# Patient Record
Sex: FEMALE | Race: WHITE | NOT HISPANIC OR LATINO | Employment: FULL TIME | URBAN - METROPOLITAN AREA
[De-identification: names, ages, dates, MRNs, and addresses within clinical notes are randomized per-mention and may not be internally consistent; named-entity substitution may affect disease eponyms.]

---

## 2017-02-28 ENCOUNTER — ALLSCRIPTS OFFICE VISIT (OUTPATIENT)
Dept: OTHER | Facility: OTHER | Age: 47
End: 2017-02-28

## 2017-03-26 ENCOUNTER — LAB CONVERSION - ENCOUNTER (OUTPATIENT)
Dept: OTHER | Facility: OTHER | Age: 47
End: 2017-03-26

## 2017-03-26 LAB — TSH SERPL DL<=0.05 MIU/L-ACNC: 2.32 MIU/L

## 2017-03-27 ENCOUNTER — GENERIC CONVERSION - ENCOUNTER (OUTPATIENT)
Dept: OTHER | Facility: OTHER | Age: 47
End: 2017-03-27

## 2017-04-06 ENCOUNTER — ALLSCRIPTS OFFICE VISIT (OUTPATIENT)
Dept: OTHER | Facility: OTHER | Age: 47
End: 2017-04-06

## 2017-06-06 ENCOUNTER — ALLSCRIPTS OFFICE VISIT (OUTPATIENT)
Dept: OTHER | Facility: OTHER | Age: 47
End: 2017-06-06

## 2017-08-07 DIAGNOSIS — Z12.31 ENCOUNTER FOR SCREENING MAMMOGRAM FOR MALIGNANT NEOPLASM OF BREAST: ICD-10-CM

## 2017-09-17 ENCOUNTER — LAB CONVERSION - ENCOUNTER (OUTPATIENT)
Dept: OTHER | Facility: OTHER | Age: 47
End: 2017-09-17

## 2017-09-17 ENCOUNTER — GENERIC CONVERSION - ENCOUNTER (OUTPATIENT)
Dept: OTHER | Facility: OTHER | Age: 47
End: 2017-09-17

## 2017-09-17 LAB
A/G RATIO (HISTORICAL): 1.3 (CALC) (ref 1–2.5)
ALBUMIN SERPL BCP-MCNC: 3.9 G/DL (ref 3.6–5.1)
ALP SERPL-CCNC: 84 U/L (ref 33–115)
ALT SERPL W P-5'-P-CCNC: 9 U/L (ref 6–29)
AST SERPL W P-5'-P-CCNC: 12 U/L (ref 10–35)
BASOPHILS # BLD AUTO: 0.9 %
BASOPHILS # BLD AUTO: 69 CELLS/UL (ref 0–200)
BILIRUB SERPL-MCNC: 0.4 MG/DL (ref 0.2–1.2)
BUN SERPL-MCNC: 11 MG/DL (ref 7–25)
BUN/CREA RATIO (HISTORICAL): NORMAL (CALC) (ref 6–22)
CALCIUM SERPL-MCNC: 8.7 MG/DL (ref 8.6–10.2)
CHLORIDE SERPL-SCNC: 108 MMOL/L (ref 98–110)
CHOLEST SERPL-MCNC: 258 MG/DL
CHOLEST/HDLC SERPL: 5.7 (CALC)
CO2 SERPL-SCNC: 21 MMOL/L (ref 20–31)
CREAT SERPL-MCNC: 0.89 MG/DL (ref 0.5–1.1)
DEPRECATED RDW RBC AUTO: 12.9 % (ref 11–15)
EGFR AFRICAN AMERICAN (HISTORICAL): 91 ML/MIN/1.73M2
EGFR-AMERICAN CALC (HISTORICAL): 78 ML/MIN/1.73M2
EOSINOPHIL # BLD AUTO: 370 CELLS/UL (ref 15–500)
EOSINOPHIL # BLD AUTO: 4.8 %
GAMMA GLOBULIN (HISTORICAL): 3 G/DL (CALC) (ref 1.9–3.7)
GLUCOSE (HISTORICAL): 92 MG/DL (ref 65–99)
HCT VFR BLD AUTO: 38.1 % (ref 35–45)
HDLC SERPL-MCNC: 45 MG/DL
HGB BLD-MCNC: 12.8 G/DL (ref 11.7–15.5)
LDL CHOLESTEROL (HISTORICAL): 178 MG/DL (CALC)
LYMPHOCYTES # BLD AUTO: 2148 CELLS/UL (ref 850–3900)
LYMPHOCYTES # BLD AUTO: 27.9 %
MCH RBC QN AUTO: 27.9 PG (ref 27–33)
MCHC RBC AUTO-ENTMCNC: 33.6 G/DL (ref 32–36)
MCV RBC AUTO: 83 FL (ref 80–100)
MONOCYTES # BLD AUTO: 739 CELLS/UL (ref 200–950)
MONOCYTES (HISTORICAL): 9.6 %
NEUTROPHILS # BLD AUTO: 4374 CELLS/UL (ref 1500–7800)
NEUTROPHILS # BLD AUTO: 56.8 %
NON-HDL-CHOL (CHOL-HDL) (HISTORICAL): 213 MG/DL (CALC)
PLATELET # BLD AUTO: 366 THOUSAND/UL (ref 140–400)
PMV BLD AUTO: 9.3 FL (ref 7.5–12.5)
POTASSIUM SERPL-SCNC: 3.5 MMOL/L (ref 3.5–5.3)
RBC # BLD AUTO: 4.59 MILLION/UL (ref 3.8–5.1)
SODIUM SERPL-SCNC: 137 MMOL/L (ref 135–146)
TOTAL PROTEIN (HISTORICAL): 6.9 G/DL (ref 6.1–8.1)
TRIGL SERPL-MCNC: 192 MG/DL
TSH SERPL DL<=0.05 MIU/L-ACNC: 2.37 MIU/L
WBC # BLD AUTO: 7.7 THOUSAND/UL (ref 3.8–10.8)

## 2017-09-19 ENCOUNTER — ALLSCRIPTS OFFICE VISIT (OUTPATIENT)
Dept: OTHER | Facility: OTHER | Age: 47
End: 2017-09-19

## 2017-09-19 DIAGNOSIS — Z12.31 ENCOUNTER FOR SCREENING MAMMOGRAM FOR MALIGNANT NEOPLASM OF BREAST: ICD-10-CM

## 2017-09-19 DIAGNOSIS — G43.709 CHRONIC MIGRAINE WITHOUT AURA WITHOUT STATUS MIGRAINOSUS, NOT INTRACTABLE: ICD-10-CM

## 2017-09-19 DIAGNOSIS — E55.9 VITAMIN D DEFICIENCY: ICD-10-CM

## 2017-09-19 DIAGNOSIS — R53.83 OTHER FATIGUE: ICD-10-CM

## 2017-09-20 ENCOUNTER — LAB CONVERSION - ENCOUNTER (OUTPATIENT)
Dept: OTHER | Facility: OTHER | Age: 47
End: 2017-09-20

## 2017-09-20 LAB
CONTAINER TYP (HISTORICAL): NORMAL
FINAL RESOLUTION (HISTORICAL): NORMAL
QUESTION/PROBLEM (HISTORICAL): NORMAL
SPECIMEN STATUS REPORT (HISTORICAL): NORMAL

## 2017-09-21 ENCOUNTER — GENERIC CONVERSION - ENCOUNTER (OUTPATIENT)
Dept: OTHER | Facility: OTHER | Age: 47
End: 2017-09-21

## 2017-09-21 ENCOUNTER — HOSPITAL ENCOUNTER (OUTPATIENT)
Dept: MAMMOGRAPHY | Facility: HOSPITAL | Age: 47
Discharge: HOME | End: 2017-09-21
Payer: COMMERCIAL

## 2017-09-21 ENCOUNTER — ALLSCRIPTS OFFICE VISIT (OUTPATIENT)
Dept: OTHER | Facility: OTHER | Age: 47
End: 2017-09-21

## 2017-09-21 DIAGNOSIS — Z12.31 ENCOUNTER FOR SCREENING MAMMOGRAM FOR MALIGNANT NEOPLASM OF BREAST: ICD-10-CM

## 2017-09-21 PROCEDURE — G0202 SCR MAMMO BI INCL CAD: HCPCS

## 2017-09-21 PROCEDURE — 77063 BREAST TOMOSYNTHESIS BI: CPT

## 2017-09-26 ENCOUNTER — ALLSCRIPTS OFFICE VISIT (OUTPATIENT)
Dept: OTHER | Facility: OTHER | Age: 47
End: 2017-09-26

## 2017-09-26 ENCOUNTER — GENERIC CONVERSION - ENCOUNTER (OUTPATIENT)
Dept: OTHER | Facility: OTHER | Age: 47
End: 2017-09-26

## 2017-09-27 ENCOUNTER — LAB CONVERSION - ENCOUNTER (OUTPATIENT)
Dept: OTHER | Facility: OTHER | Age: 47
End: 2017-09-27

## 2017-09-27 LAB
ADDITIONAL INFORMATION (HISTORICAL): ABNORMAL
ADEQUACY: (HISTORICAL): ABNORMAL
COMMENT (HISTORICAL): ABNORMAL
CONTAINER TYP (HISTORICAL): NORMAL
CYTOTECHNOLOGIST: (HISTORICAL): ABNORMAL
FINAL RESOLUTION (HISTORICAL): NORMAL
GENERAL CATEGORIZATION (HISTORICAL): ABNORMAL
HPV MRNA E6/E7 (HISTORICAL): NOT DETECTED
INTERPRETATION (HISTORICAL): ABNORMAL
LMP (HISTORICAL): ABNORMAL
PATHOLOGIST (HISTORICAL): ABNORMAL
PREV. BX: (HISTORICAL): ABNORMAL
PREV. PAP (HISTORICAL): ABNORMAL
QUESTION/PROBLEM (HISTORICAL): NORMAL
SOURCE (HISTORICAL): ABNORMAL
SPECIMEN STATUS REPORT (HISTORICAL): NORMAL

## 2017-10-25 ENCOUNTER — GENERIC CONVERSION - ENCOUNTER (OUTPATIENT)
Dept: OTHER | Facility: OTHER | Age: 47
End: 2017-10-25

## 2017-10-26 DIAGNOSIS — E78.5 HYPERLIPIDEMIA: ICD-10-CM

## 2017-11-10 ENCOUNTER — ALLSCRIPTS OFFICE VISIT (OUTPATIENT)
Dept: OTHER | Facility: OTHER | Age: 47
End: 2017-11-10

## 2017-11-13 NOTE — PROGRESS NOTES
Assessment    1  Shingles (053 9) (B02 9)    Plan  Shingles    · Ultram 50 MG Oral Tablet (TraMADol HCl); TAKE 1 TABLET 3 TIMES DAILY ASNEEDED   · ValACYclovir HCl - 1 GM Oral Tablet; TAKE 1 TABLET 3 TIMES DAILY x 7 days    Discussion/Summary    Patient to start on Valtrex 1 GM TID x 7 days  Patient to start on Ultram 50 mg by mouth TID as needed for nerve pain  Patient instructed to avoid sharing towels or other personal products, provide good hand hygiene, and to stay away from small children, the elderly, and any pregnant women  WIll follow up on Wednesday for a recheck  If any issues or concerns please call office  Possible side effects of new medications were reviewed with the patient/guardian today  The treatment plan was reviewed with the patient/guardian  The patient/guardian understands and agrees with the treatment plan      Chief Complaint  Rash to neck      History of Present Illness  HPI: Jeny Durbin is here today for an acute visit  She states on Sunday she woke up with what she thought was a pimple on the back of her neck  She states she popped the area and since that time has been having pain to her neck  The area is noted to the left side of her posterior neck and she did notice that she has a rash with pustules to her left upper arm as well  She has been feeling run down and under the weather and was running a low grade fever  She states she was covering her neck with a band aid but states it has really been hurting  She also does have swollen lymph nodes that are painful to the left side of her neck  She denies any other issues  Review of Systems   Constitutional: as noted in HPI   ENT: no ear ache, no loss of hearing, no nosebleeds or nasal discharge, no sore throat or hoarseness  Cardiovascular: no complaints of slow or fast heart rate, no chest pain, no palpitations, no leg claudication or lower extremity edema    Respiratory: no complaints of shortness of breath, no wheezing, no dyspnea on exertion, no orthopnea or PND  Breasts: no complaints of breast pain, breast lump or nipple discharge  Gastrointestinal: no complaints of abdominal pain, no constipation, no nausea or diarrhea, no vomiting, no bloody stools  Genitourinary: no complaints of dysuria, no incontinence, no pelvic pain, no dysmenorrhea, no vaginal discharge or abnormal vaginal bleeding  Musculoskeletal: no complaints of arthralgia, no myalgia, no joint swelling or stiffness, no limb pain or swelling  Integumentary: as noted in HPI  Neurological: no complaints of headache, no confusion, no numbness or tingling, no dizziness or fainting  ROS reviewed  Active Problems    1  Anxiety (300 00) (F41 9)   2  Asthma (493 90) (J45 909)   3  Bruxism (306 8) (F45 8)   4  Cellulitis of foot (682 7) (L03 119)   5  Chronic migraine without aura (346 70) (G43 709)   6  Chronic sinusitis (473 9) (J32 9)   7  Chronic tension-type headache, intractable (339 12) (G44 221)   8  Dermatitis (692 9) (L30 9)   9  Encounter for routine gynecological examination with Papanicolaou smear of cervix (V72 31,V76 2) (Z01 419)   10  Encounter for screening mammogram for malignant neoplasm of breast (V76 12)  (Z12 31)   11  Fatigue (780 79) (R53 83)   12  Heartburn (787 1) (R12)   13  Hyperlipidemia (272 4) (E78 5)   14  Hypertension (401 9) (I10)   15  Hypokalemia (276 8) (E87 6)   16  Low vitamin D level (268 9) (E55 9)   17  Menorrhagia with irregular cycle (626 2) (N92 1)   18  Nonspecific abnormal results of function study of thyroid (794 5) (R94 6)   19  Obstructive sleep apnea (327 23) (G47 33)    Past Medical History  1  History of Anxiety (300 00) (F41 9)   2  History of Endometriosis (617 9) (N80 9)   3  History of High cholesterol (272 0) (E78 00)   4  History of acute sinusitis (V12 69) (Z87 09)   5  History of esophageal reflux (V12 79) (Z87 19)   6   History of Plantar fasciitis (728 71) (M72 2)  Active Problems And Past Medical History Reviewed: The active problems and past medical history were reviewed and updated today  Family History  Mother    1  Family history of colon cancer (V16 0) (Z80 0)   2  Family history of essential hypertension (V17 49) (Z82 49)   3  Family history unobtainable   4  Family history of Living and Healthy  Father    5  Family history of colon cancer (V16 0) (Z80 0)   6  Family history of essential hypertension (V17 49) (Z82 49)   7  Family history unobtainable  Maternal Grandmother    8  Family history of Colon Cancer (V16 0)   9  Family history of colon cancer (V16 0) (Z80 0)   10  Family history unobtainable  Paternal Grandmother    6  Family history unobtainable  Maternal Grandfather    12  Family history unobtainable  Paternal Grandfather    15  Family history unobtainable  Maternal Uncle    14  Family history of Cancer  Family History    15  Family history of Asperger's disorder   16  Family history of Diabetes Mellitus (V18 0)   17  Family history of bipolar disorder (V17 0) (Z81 8)   18  Family history of depression (V17 0) (Z81 8)   19  Family history of Hypertension (V17 49)   20  Family history of Raynaud's phenomenon  Family History Reviewed: The family history was reviewed and updated today  Social History   · Adopted   · Being A Social Drinker   · Caffeine use (V49 89) (F15 90)   ·    · Never A Smoker   · No drug use   · Two children  The social history was reviewed and is unchanged  Surgical History    1  History of  Section   2  History of Foot Surgery Left   3  History of Laparoscopy Of Uterus  Surgical History Reviewed: The surgical history was reviewed and updated today  Current Meds   1  Allegra 180 MG TABS; Therapy: (Recorded:05Siw4707) to Recorded   2  Atorvastatin Calcium 10 MG Oral Tablet; TAKE 1 TABLET DAILY; Therapy: 21BJD9050 to (Lan Uriostegui)  Requested for: 54Pcg9224; Last Rx:54Xtu3971 Ordered   3   Divalproex Sodium  MG Oral Tablet Extended Release 24 Hour; 1 PO Q HS; Therapy: 21XCN2232 to (Evaluate:08Kxd0777)  Requested for: 70ZQX9252; Last Rx:40Qjy4484 Ordered   4  Fluticasone Propionate 50 MCG/ACT Nasal Suspension; USE 2 SPRAYS IN EACH NOSTRIL ONCE DAILY; Therapy: 91DBY0237 to (Last Rx:06Apr2017)  Requested for: 33Gai4794 Ordered   5  Magnesium 200 MG Oral Tablet; Therapy: (Recorded:06Jun2017) to Recorded   6  Methocarbamol 500 MG Oral Tablet; take 1 tab qhs x 2 weeks, then 1 tab qOD x 1 week, then prn spasm or headache; Therapy: 81WHL0662 to (Evaluate:92Yjv1956)  Requested for: 84PLZ2614; Last Rx:06Jun2017 Ordered   7  Protriptyline HCl - 5 MG Oral Tablet; One in am for 10 days then two in am after that; Therapy: 33TUJ6908 to (Last Myrl Senegal)  Requested for: 87KUX4733 Ordered   8  Rizatriptan Benzoate 10 MG Oral Tablet; TAKE 1 TABLET AT ONSET OF HEADACHE, MAY REPEAT IN 2 HOURS  MAX 2 TABLETS IN 24 HOURS; Therapy: 47OIS2847 to (Last Rx:39Vyy8632)  Requested for: 28Rqg3121 Ordered   9  SUMAtriptan Succinate 6 MG/0 5ML Subcutaneous Solution Auto-injector; INJECT 0 5ML AT ONSET OF HEADACHE REPEAT IN 2 HRS, MAX 2 PER DAY  NO MORE THEN 2 DAYS/WEEK; Therapy: 04JEF8116 to (Evaluate:27Jun2017)  Requested for: 35WNB2500; Last FC:09ATH3155 Ordered   10  Topiramate 100 MG Oral Tablet; TAKE 2 TABLETS AT BEDTIME; Therapy: 15QAJ5674 to (Leslie Garcia)  Requested for: 02Kht2910; Last  Rx:37Qaf6212 Ordered   11  Venlafaxine HCl ER 75 MG Oral Capsule Extended Release 24 Hour; take 1 capsule  daily; Therapy: 39Kfe0431 to (Last Rx:97Aes8349)  Requested for: 75Sbb9898 Ordered   12  Verapamil HCl  MG Oral Tablet Extended Release; take 1 tablet by mouth once  daily; Therapy: 13Ryw4548 to (Percell Closs)  Requested for: 43KRX6960; Last  Rx:39Myx0212 Ordered   15  Zantac TABS; Therapy: (Recorded:67Qdl1187) to Recorded    The medication list was reviewed and updated today  Allergies  1  No Known Drug Allergies  2   Seasonal    Vitals   Recorded: 85SZW3411 01:47PM   Temperature 99 3 F, Temporal   Heart Rate 123   Respiration 18   Systolic 037, LUE, Sitting   Diastolic 78, LUE, Sitting   Height 5 ft 6 in   Weight 196 lb    BMI Calculated 31 64   BSA Calculated 1 98   O2 Saturation 98       Physical Exam   Constitutional  General appearance: No acute distress, well appearing and well nourished  Pulmonary  Respiratory effort: No increased work of breathing or signs of respiratory distress  Auscultation of lungs: Clear to auscultation  Cardiovascular  Palpation of heart: Normal PMI, no thrills  Auscultation of heart: Normal rate and rhythm, normal S1 and S2, without murmurs  Lymphatic  Palpation of lymph nodes in neck: Abnormal  -- lymphadenopathy noted to left side of neck  Skin  Skin and subcutaneous tissue: Abnormal  -- crust over lesion noted to left side of posterior neck with some redness around the border of the scab, red cluster noted to left upper arm with scabbed over lesions  Psychiatric  Orientation to person, place, and time: Normal    Mood and affect: Normal          Future Appointments    Date/Time Provider Specialty Site   12/19/2017 08:30 AM Wilner Gross MD Neurology [facility] NEUROLOGY [facility]   12/21/2017 08:00 AM MABEL Pabon   [address] [place] [address]   11/15/2017 05:40 PM Catina Gustafson       Signatures   Electronically signed by : Frankie Bernard, ; Nov 10 2017  2:21PM EST                       (Author)    Electronically signed by : MABEL Singletary ; Nov 12 2017  9:40PM EST                       (Co-author)

## 2017-11-15 ENCOUNTER — GENERIC CONVERSION - ENCOUNTER (OUTPATIENT)
Dept: OTHER | Facility: OTHER | Age: 47
End: 2017-11-15

## 2017-12-10 ENCOUNTER — GENERIC CONVERSION - ENCOUNTER (OUTPATIENT)
Dept: OTHER | Facility: OTHER | Age: 47
End: 2017-12-10

## 2017-12-10 ENCOUNTER — LAB CONVERSION - ENCOUNTER (OUTPATIENT)
Dept: OTHER | Facility: OTHER | Age: 47
End: 2017-12-10

## 2017-12-10 LAB
25(OH)D3 SERPL-MCNC: 28 NG/ML (ref 30–100)
A/G RATIO (HISTORICAL): 1.2 (CALC) (ref 1–2.5)
ALBUMIN SERPL BCP-MCNC: 3.9 G/DL (ref 3.6–5.1)
ALP SERPL-CCNC: 73 U/L (ref 33–115)
ALT SERPL W P-5'-P-CCNC: 8 U/L (ref 6–29)
AST SERPL W P-5'-P-CCNC: 12 U/L (ref 10–35)
BASOPHILS # BLD AUTO: 0.8 %
BASOPHILS # BLD AUTO: 51 CELLS/UL (ref 0–200)
BILIRUB SERPL-MCNC: 0.3 MG/DL (ref 0.2–1.2)
BUN SERPL-MCNC: 9 MG/DL (ref 7–25)
BUN/CREA RATIO (HISTORICAL): NORMAL (CALC) (ref 6–22)
CALCIUM SERPL-MCNC: 9.1 MG/DL (ref 8.6–10.2)
CHLORIDE SERPL-SCNC: 106 MMOL/L (ref 98–110)
CHOLEST SERPL-MCNC: 199 MG/DL
CHOLEST/HDLC SERPL: 5.1 (CALC)
CO2 SERPL-SCNC: 25 MMOL/L (ref 20–31)
CREAT SERPL-MCNC: 0.85 MG/DL (ref 0.5–1.1)
DEPRECATED RDW RBC AUTO: 13.8 % (ref 11–15)
EGFR AFRICAN AMERICAN (HISTORICAL): 96 ML/MIN/1.73M2
EGFR-AMERICAN CALC (HISTORICAL): 83 ML/MIN/1.73M2
EOSINOPHIL # BLD AUTO: 250 CELLS/UL (ref 15–500)
EOSINOPHIL # BLD AUTO: 3.9 %
GAMMA GLOBULIN (HISTORICAL): 3.3 G/DL (CALC) (ref 1.9–3.7)
GLUCOSE (HISTORICAL): 83 MG/DL (ref 65–99)
HCT VFR BLD AUTO: 38.3 % (ref 35–45)
HDLC SERPL-MCNC: 39 MG/DL
HGB BLD-MCNC: 13 G/DL (ref 11.7–15.5)
LDL CHOLESTEROL (HISTORICAL): 132 MG/DL (CALC)
LYMPHOCYTES # BLD AUTO: 1798 CELLS/UL (ref 850–3900)
LYMPHOCYTES # BLD AUTO: 28.1 %
MCH RBC QN AUTO: 28.5 PG (ref 27–33)
MCHC RBC AUTO-ENTMCNC: 33.9 G/DL (ref 32–36)
MCV RBC AUTO: 84 FL (ref 80–100)
MONOCYTES # BLD AUTO: 499 CELLS/UL (ref 200–950)
MONOCYTES (HISTORICAL): 7.8 %
NEUTROPHILS # BLD AUTO: 3802 CELLS/UL (ref 1500–7800)
NEUTROPHILS # BLD AUTO: 59.4 %
NON-HDL-CHOL (CHOL-HDL) (HISTORICAL): 160 MG/DL (CALC)
PLATELET # BLD AUTO: 369 THOUSAND/UL (ref 140–400)
PMV BLD AUTO: 10 FL (ref 7.5–12.5)
POTASSIUM SERPL-SCNC: 4.5 MMOL/L (ref 3.5–5.3)
RBC # BLD AUTO: 4.56 MILLION/UL (ref 3.8–5.1)
SODIUM SERPL-SCNC: 138 MMOL/L (ref 135–146)
TOTAL PROTEIN (HISTORICAL): 7.2 G/DL (ref 6.1–8.1)
TRIGL SERPL-MCNC: 146 MG/DL
TSH SERPL DL<=0.05 MIU/L-ACNC: 2.27 MIU/L
WBC # BLD AUTO: 6.4 THOUSAND/UL (ref 3.8–10.8)

## 2017-12-11 ENCOUNTER — LAB CONVERSION - ENCOUNTER (OUTPATIENT)
Dept: OTHER | Facility: OTHER | Age: 47
End: 2017-12-11

## 2017-12-11 LAB
25(OH)D3 SERPL-MCNC: 28 NG/ML (ref 30–100)
HOMOCYST(E)INE, P/S (HISTORICAL): 8 UMOL/L
VIT B12 SERPL-MCNC: 923 PG/ML (ref 200–1100)

## 2017-12-13 ENCOUNTER — GENERIC CONVERSION - ENCOUNTER (OUTPATIENT)
Dept: OTHER | Facility: OTHER | Age: 47
End: 2017-12-13

## 2017-12-19 ENCOUNTER — ALLSCRIPTS OFFICE VISIT (OUTPATIENT)
Dept: OTHER | Facility: OTHER | Age: 47
End: 2017-12-19

## 2017-12-20 NOTE — PROGRESS NOTES
Assessment  1  Chronic migraine without aura (346 70) (G43 709)   2  Chronic tension-type headache, intractable (339 12) (G44 221)   3  Obstructive sleep apnea (327 23) (G47 33)    Plan  Chronic migraine without aura    · Divalproex Sodium  MG Oral Tablet Extended Release 24 Hour; 1 PO QHS   Rx By: Viridiana Hwang; Dispense: 30 Days ; #:30 Tablet Extended Release 24 Hour; Refill: 11; For: Chronic migraine without aura; KALEIGH = N; Verified Transmission to [place] [address]; Last Updated By: [employer]; 12/19/2017 9:11:10 AM   · Protriptyline HCl - 5 MG Oral Tablet; One in am for 10 days then two in am afterthat   Rx By: Viridiana Hwang; Dispense: 0 Days ; #:60 Tablet; Refill: 11; For: Chronic migraine without aura; KALEIGH = N; Verified Transmission to [place] [address]; Last Updated By: [employer]; 12/19/2017 9:11:11 AM   · Rizatriptan Benzoate 10 MG Oral Tablet; TAKE 1 TABLET AT ONSET OFHEADACHE, MAY REPEAT IN 2 HOURS  MAX 2 TABLETS IN 24 HOURS   Rx By: Viridiana Hwang; Dispense: 0 Days ; #:12 Tablet; Refill: 6;For: Chronic migraine without aura; KALEIGH = N; Verified Transmission to [place] [address]; Last Updated By: [employer]; 12/19/2017 9:11:10 AM   · Ketorolac Tromethamine 30 MG/ML Injection Solution   Rx By: Viridiana Hwang; For: Chronic migraine without aura; Dose of 2 ML; Intramuscular; KALEIGH = N; Administered by: Ramirez Ayala MA: 12/19/2017 9:06:00 AM  Chronic migraine without aura, Chronic tension-type headache, intractable    · Topiramate 100 MG Oral Tablet; TAKE 1 TABLETS AT BEDTIME   Rx By: Viridiana Hwang; Dispense: 30 Days ; #:60 Tablet; Refill: 11; For: Chronic migraine without aura, Chronic tension-type headache, intractable; KALEIGH = N; Verified Transmission to [place] [address]; Last Updated By: [employer]; 12/19/2017 9:11:10 AM  Obstructive sleep apnea    · Follow-up visit in 1 year Evaluation and Treatment  Follow-up  Status: Complete Done:98Htd6431   Ordered; For: Obstructive sleep apnea; Ordered By: Oniel Aquino Performed:  Due: 90LIV9728; Last Updated By: Gina Hanna; 12/19/2017 9:33:45 AM    Chief Complaint  Chief Complaint Free Text Note Form: follow up for migraines  History of Present Illness  HPI: We had the pleasure of evaluating Barrie Stokes in neurological follow up today  As you know she is a 40year old right handed female  She is a welfare   Chronic Migraine/ Tension Headaches:What medications do you take or have you taken for your headaches? PREVENTIVE: Vitamin B2 400mg, amlodipine, Pamalor, mag oxide, venlafaxine, gabapentin- side effects, cyproheptadine, verapamil, zonisamide, topamaxABORTIVE: Treximet, maxalt- helps, naproxen, dexamethasone, ibuprofen, toradol- did help break help- Non-Medical/Alternative Treatments used in the past for headaches: message- Headache trigger: Fatigue, Stress/Tension, missing meals, chewing, lack of sleep, menstruation, weatherAura: - noneHow often do the headaches occur - TTH- 2 times a week; migraines- since last seen 5 altogether  She has noted they are occurring with her menstruation  What time of the day do the headaches start â variesHow long do the headaches last - TTH- all day; migraines 2hr to 2 daysWhere are they located â start left side of head, frontal region, occipital, neckWhat is the intensity of pain âTTH- average pain level 5/10; migraines- average pain level 8-10/10Describe your usual headache â migraines- Pressure, sharp; TTH- dullAssociated symptoms: nausea, Decrease appetite, photophobia, phonophobia, sensitive to smells, Problem with concentration, left pupil size change, light-headed or dizzy, stiff or sore neck, prefer to be alone and in a dark room, unable to work, irritable, easily frustratedSleep: she is getting up very tired and has noted she is very fatigued   Most of her headaches are occurring in the morning thus we thinks she may have sleep apnea  She is grinding her teeth  She also tells me that she was not able to go to sleep center in the past as her insurance did not cover it  Brain MRI wo contrast 2/26/15- Several tiny bifrontal subcortical T2 FLAIR and hyperintense foci suspicious for mild chronic micro-vascular changes which may be associated with migraine headaches- Mild multi-focal paranasal sinus disease w/o air-fluid levelsROS reviewed      Review of Systems  Neurological ROS:  Constitutional: no fever, no chills, no recent weight gain, no recent weight loss, no complaints of feeling tired, no changes in appetite  HEENT: sinus problems,-- feeling congested-- and-- eye pain  Cardiovascular:  no chest pain or pressure, no palpitations present, the heart rate was not rapid or irregular, no swelling in the arms or legs, no poor circulation  Respiratory:  no unusual or persistant cough, no shortness of breath with or without exertion  Gastrointestinal:  no nausea, no vomiting, no diarrhea, no abdominal pain, no changes in bowel habits, no melena, no loss of bowel control  Genitourinary:  no incontinence, no feelings of urinary urgency, no increase in frequency, no urinary hesitancy, no dysuria, no hematuria  Musculoskeletal:  no arthralgias, no myalgias, no immobility or loss of function, no head/neck/back pain, no pain while walking  Integumentary  no masses, no rash, no skin lesions, no livedo reticularis  Psychiatric:  no anxiety, no depression, no mood swings, no psychiatric hospitalizations, no sleep problems  Endocrine  no unusual weight loss or gain, no excessive urination, no excessive thirst, no hair loss or gain, no hot or cold intolerance, no menstrual period change or irregularity, no loss of sexual ability or drive, no erection difficulty, no nipple discharge  Hematologic/Lymphatic:  no unusual bleeding, no tendency for easy bruising, no clotting skin or lumps  Neurological General: headache  Neurological Mental Status:  no confusion, no mood swings, no alteration or loss of consciousness, no difficulty expressing/understanding speech, no memory problems  Neurological Cranial Nerves:  no blurry or double vision, no loss of vision, no face drooping, no facial numbness or weakness, no taste or smell loss/changes, no hearing loss or ringing, no vertigo or dizziness, no dysphagia, no slurred speech  Neurological Motor findings include:  no tremor, no twitching, no cramping(pre/post exercise), no atrophy  Neurological Coordination:  no unsteadiness, no vertigo or dizziness, no clumsiness, no problems reaching for objects  Neurological Sensory:  no numbness, no pain, no tingling, does not fall when eyes closed or taking a shower  Neurological Gait:  no difficulty walking, not falling to one side, no sensation of being pushed, has not had falls  ROS Reviewed:   ROS reviewed  Active Problems  1  Anxiety (300 00) (F41 9)   2  Asthma (493 90) (J45 909)   3  Bruxism (306 8) (F45 8)   4  Cellulitis of foot (682 7) (L03 119)   5  Chronic migraine without aura (346 70) (G43 709)   6  Chronic sinusitis (473 9) (J32 9)   7  Chronic tension-type headache, intractable (339 12) (G44 221)   8  Dermatitis (692 9) (L30 9)   9  Encounter for routine gynecological examination with Papanicolaou smear of cervix (V72 31,V76 2) (Z01 419)   10  Encounter for screening mammogram for malignant neoplasm of breast (V76 12)  (Z12 31)   11  Fatigue (780 79) (R53 83)   12  Heartburn (787 1) (R12)   13  Hyperlipidemia (272 4) (E78 5)   14  Hypertension (401 9) (I10)   15  Hypokalemia (276 8) (E87 6)   16  Low vitamin D level (268 9) (E55 9)   17  Menorrhagia with irregular cycle (626 2) (N92 1)   18  Nonspecific abnormal results of function study of thyroid (794 5) (R94 6)   19  Obstructive sleep apnea (327 23) (G47 33)   20  Shingles (053 9) (B02 9)    Past Medical History  1  History of Anxiety (300 00) (F41 9)   2   History of Endometriosis (617 9) (N80 9)   3  History of High cholesterol (272 0) (E78 00)   4  History of acute sinusitis (V12 69) (Z87 09)   5  History of esophageal reflux (V12 79) (Z87 19)   6  History of Plantar fasciitis (728 71) (M72 2)    Surgical History  1  History of  Section   2  History of Foot Surgery Left   3  History of Laparoscopy Of Uterus    Family History  Mother    1  Family history of colon cancer (V16 0) (Z80 0)   2  Family history of essential hypertension (V17 49) (Z82 49)   3  Family history unobtainable   4  Family history of Living and Healthy  Father    5  Family history of colon cancer (V16 0) (Z80 0)   6  Family history of essential hypertension (V17 49) (Z82 49)   7  Family history unobtainable  Maternal Grandmother    8  Family history of Colon Cancer (V16 0)   9  Family history of colon cancer (V16 0) (Z80 0)   10  Family history unobtainable  Paternal Grandmother    6  Family history unobtainable  Maternal Grandfather    12  Family history unobtainable  Paternal Grandfather    15  Family history unobtainable  Maternal Uncle    14  Family history of Cancer  Family History    15  Family history of Asperger's disorder   16  Family history of Diabetes Mellitus (V18 0)   17  Family history of bipolar disorder (V17 0) (Z81 8)   18  Family history of depression (V17 0) (Z81 8)   19  Family history of Hypertension (V17 49)   20  Family history of Raynaud's phenomenon    Social History   · Adopted   · Being A Social Drinker   · Caffeine use (V49 89) (F15 90)   ·    · Never A Smoker   · No drug use   · Two children    Current Meds   1  Allegra 180 MG TABS; Therapy: (Recorded:34Yjd2631) to Recorded   2  Atorvastatin Calcium 10 MG Oral Tablet; TAKE 1 TABLET DAILY; Therapy: 67JHQ2810 to (Perico Rosenthal)  Requested for: 23Gay2335; Last Rx:57Erz9247 Ordered   3  Divalproex Sodium  MG Oral Tablet Extended Release 24 Hour; 1 PO Q HS;  Therapy: 55MLF9061 to (Evaluate:12Qhx5027)  Requested for: 75DVQ5806; Last Rx: 26QBE8339 Ordered   4  Fluticasone Propionate 50 MCG/ACT Nasal Suspension; USE 2 SPRAYS IN EACH NOSTRIL ONCE DAILY; Therapy: 39FPY7349 to (Last Rx:06Apr2017)  Requested for: 06Apr2017 Ordered   5  Magnesium 200 MG Oral Tablet; Therapy: (Recorded:06Jun2017) to Recorded   6  Methocarbamol 500 MG Oral Tablet; take 1 tab qhs x 2 weeks, then 1 tab qOD x 1 week, then prn spasm or headache; Therapy: 79MQX2037 to (Evaluate:85Ocn6128)  Requested for: 51NBE2268; Last Rx:06Jun2017 Ordered   7  Protriptyline HCl - 5 MG Oral Tablet; One in am for 10 days then two in am after that; Therapy: 98XVM2163 to (Nii Bates)  Requested for: 71PBE9155 Ordered   8  Rizatriptan Benzoate 10 MG Oral Tablet; TAKE 1 TABLET AT ONSET OF HEADACHE, MAY REPEAT IN 2 HOURS  MAX 2 TABLETS IN 24 HOURS; Therapy: 99CEH3015 to (Last Rx:56Cnv2164)  Requested for: 55Sga0414 Ordered   9  SUMAtriptan Succinate 6 MG/0 5ML Subcutaneous Solution Auto-injector; INJECT 0 5ML AT ONSET OF HEADACHE REPEAT IN 2 HRS, MAX 2 PER DAY  NO MORE THEN 2 DAYS/WEEK; Therapy: 65ZSB9100 to (Evaluate:27Jun2017)  Requested for: 78ZHB7517; Last LQ:90ANH1348 Ordered   10  Topiramate 100 MG Oral Tablet; TAKE 2 TABLETS AT BEDTIME; Therapy: 31BQO6044 to (Samy Richards)  Requested for: 13Fcv8830; Last  Rx:76Tep8697 Ordered   11  Verapamil HCl  MG Oral Tablet Extended Release; take 1 tablet by mouth once  daily; Therapy: 55Qan8090 to (Kwadwo Deras)  Requested for: 17IFO1724; Last  Rx:39Eom8833 Ordered   12  Zantac TABS; Therapy: (Recorded:82Zgz8221) to Recorded    Allergies  1  No Known Drug Allergies  2  Seasonal    Vitals  Signs   Recorded: 19Dec2017 08:45AM   Heart Rate: 426  Systolic: 207  Diastolic: 93  Weight: 097 lb 12 8 oz  BMI Calculated: 31 93  BSA Calculated: 1 99    Physical Exam   Constitutional  General appearance: No acute distress, well appearing and well nourished     Eyes  Ophthalmoscopic examination: Vision is grossly normal  Gross visual field testing by confrontation shows no abnormalities  EOMI in both eyes  Conjunctivae clear  Eyelids normal palpebral fissures equal  Orbits exhibit normal position  No discharge from the eyes  PERRL  Musculoskeletal  Gait and station: Normal gait, stance and balance  Muscle strength: Normal strength throughout  Muscle tone: No atrophy, abnormal movements, flaccidity, cogwheeling or spasticity     Neurologic  Orientation to person, place, and time: Normal    2nd cranial nerve: Normal    3rd, 4th, and 6th cranial nerves: Normal    5th cranial nerve: Normal    7th cranial nerve: Normal    8th cranial nerve: Normal    9th cranial nerve: Normal    11th cranial nerve: Normal    12th cranial nerve: Normal    Sensation: Normal    Reflexes: Normal    Coordination: Normal        Signatures   Electronically signed by : Jaydon Wilkerson MD; Dec 19 2017  3:58PM EST                       (Author)

## 2017-12-26 DIAGNOSIS — I10 ESSENTIAL (PRIMARY) HYPERTENSION: ICD-10-CM

## 2017-12-26 DIAGNOSIS — G47.33 OBSTRUCTIVE SLEEP APNEA: ICD-10-CM

## 2017-12-26 DIAGNOSIS — E78.5 HYPERLIPIDEMIA: ICD-10-CM

## 2017-12-26 DIAGNOSIS — F41.9 ANXIETY DISORDER: ICD-10-CM

## 2018-01-10 NOTE — RESULT NOTES
Verified Results  (1) CBC/PLT/DIFF [id number] 10:18AM Jose Luis Rachel   REPORT COMMENT:  FASTING:YES     Test Name Result Flag Reference   WHITE BLOOD CELL COUNT 7 7 Thousand/uL  3 8-10 8   RED BLOOD CELL COUNT 4 59 Million/uL  3 80-5 10   HEMOGLOBIN 12 8 g/dL  11 7-15 5   HEMATOCRIT 38 1 %  35 0-45 0   MCV 83 0 fL  80 0-100 0   MCH 27 9 pg  27 0-33 0   MCHC 33 6 g/dL  32 0-36 0   RDW 12 9 %  11 0-15 0   PLATELET COUNT 904 Thousand/uL  140-400   ABSOLUTE NEUTROPHILS 4374 cells/uL  3079-1915   ABSOLUTE LYMPHOCYTES 2148 cells/uL  850-3900   ABSOLUTE MONOCYTES 739 cells/uL  200-950   ABSOLUTE EOSINOPHILS 370 cells/uL     ABSOLUTE BASOPHILS 69 cells/uL  0-200   NEUTROPHILS 56 8 %     LYMPHOCYTES 27 9 %     MONOCYTES 9 6 %     EOSINOPHILS 4 8 %     BASOPHILS 0 9 %     MPV 9 3 fL  7 5-12 5     (1) COMPREHENSIVE METABOLIC PANEL [id number] 45:23LB Jose Luis Rachel     Test Name Result Flag Reference   GLUCOSE 92 mg/dL  65-99   Fasting reference interval   UREA NITROGEN (BUN) 11 mg/dL  7-25   CREATININE 0 89 mg/dL  0 50-1 10   eGFR NON-AFR   AMERICAN 78 mL/min/1 73m2  > OR = 60   eGFR AFRICAN AMERICAN 91 mL/min/1 73m2  > OR = 60   BUN/CREATININE RATIO   2-72   NOT APPLICABLE (calc)   SODIUM 137 mmol/L  135-146   POTASSIUM 3 5 mmol/L  3 5-5 3   CHLORIDE 108 mmol/L     CARBON DIOXIDE 21 mmol/L  20-31   CALCIUM 8 7 mg/dL  8 6-10 2   PROTEIN, TOTAL 6 9 g/dL  6 1-8 1   ALBUMIN 3 9 g/dL  3 6-5 1   GLOBULIN 3 0 g/dL (calc)  1 9-3 7   ALBUMIN/GLOBULIN RATIO 1 3 (calc)  1 0-2 5   BILIRUBIN, TOTAL 0 4 mg/dL  0 2-1 2   ALKALINE PHOSPHATASE 84 U/L     AST 12 U/L  10-35   ALT 9 U/L  6-29     (1) LIPID PANEL, FASTING [id number] 10:18AM Jose Luis Willarders     Test Name Result Flag Reference   CHOLESTEROL, TOTAL 258 mg/dL H <200   HDL CHOLESTEROL 45 mg/dL L >59   TRIGLICERIDES 996 mg/dL H <150   LDL-CHOLESTEROL 178 mg/dL (calc) H    Reference range: <100     Desirable range <100 mg/dL for patients with CHD or  diabetes and <70 mg/dL for diabetic patients with  known heart disease  LDL-C is now calculated using the Daryl-Gore   calculation, which is a validated novel method providing   better accuracy than the Friedewald equation in the   estimation of LDL-C  Ya Paniagua  5081;473(52): 6060-1492   (http://[employer]/faq/TMZ254)   CHOL/HDLC RATIO 5 7 (calc) H <5 0   NON HDL CHOLESTEROL 213 mg/dL (calc) H <130   For patients with diabetes plus 1 major ASCVD risk   factor, treating to a non-HDL-C goal of <100 mg/dL   (LDL-C of <70 mg/dL) is considered a therapeutic   option       (Q) TSH, 3RD GENERATION [id number] 10:18AM Hafsa Hooks   REPORT COMMENT:  FASTING:YES     Test Name Result Flag Reference   TSH 2 37 mIU/L     Reference Range                         > or = 20 Years  0 40-4 50                              Pregnancy Ranges            First trimester    0 26-2 66            Second trimester   0 55-2 73            Third trimester    0 43-2 91

## 2018-01-12 VITALS
WEIGHT: 197 LBS | HEART RATE: 89 BPM | DIASTOLIC BLOOD PRESSURE: 83 MMHG | SYSTOLIC BLOOD PRESSURE: 123 MMHG | BODY MASS INDEX: 32.28 KG/M2

## 2018-01-12 VITALS
WEIGHT: 196 LBS | TEMPERATURE: 99.3 F | OXYGEN SATURATION: 98 % | RESPIRATION RATE: 18 BRPM | DIASTOLIC BLOOD PRESSURE: 78 MMHG | HEART RATE: 123 BPM | HEIGHT: 66 IN | BODY MASS INDEX: 31.5 KG/M2 | SYSTOLIC BLOOD PRESSURE: 128 MMHG

## 2018-01-12 NOTE — MISCELLANEOUS
Message  Return to work or school:   Param Leigh is under my professional care  She was seen in my office on 11/28/16       Please allow patient to have different chair to provide better support for neck/back to help prevent migraines thus allowing patient to function at full capacity and work efficiently  Jolly Hidalgo PA-C        Future Appointments    Signatures   Electronically signed by : Jolly Hidalgo [facility]; Nov 28 2016 10:52AM EST                       (Author)    Electronically signed by : Jolly Hidalgo [facility]; Nov 28 2016 10:53AM EST                       (Author)    Electronically signed by : Kym Mcgovern MD; Nov 28 2016 12:18PM EST                       (Co-participant)

## 2018-01-14 VITALS
HEIGHT: 66 IN | OXYGEN SATURATION: 98 % | TEMPERATURE: 97.2 F | BODY MASS INDEX: 32.22 KG/M2 | SYSTOLIC BLOOD PRESSURE: 122 MMHG | RESPIRATION RATE: 22 BRPM | WEIGHT: 200.5 LBS | HEART RATE: 95 BPM | DIASTOLIC BLOOD PRESSURE: 70 MMHG

## 2018-01-14 VITALS
BODY MASS INDEX: 32.04 KG/M2 | HEART RATE: 93 BPM | OXYGEN SATURATION: 98 % | DIASTOLIC BLOOD PRESSURE: 90 MMHG | WEIGHT: 198.5 LBS | SYSTOLIC BLOOD PRESSURE: 140 MMHG

## 2018-01-14 VITALS
SYSTOLIC BLOOD PRESSURE: 122 MMHG | BODY MASS INDEX: 32.17 KG/M2 | WEIGHT: 199.31 LBS | DIASTOLIC BLOOD PRESSURE: 72 MMHG

## 2018-01-15 NOTE — MISCELLANEOUS
To whom it may concern,     Marek Campoverde, 1970, is under the care of [facility]  Due to her medical condition, she was unable to work on 9/25/17 and needed to come in late on 10/3/17  She also  had an appointment 9/26/17 which resulted in her being late for work that day  Please contact my office with any questions or concerns at [phone]  Sincerely,             MABEL Casillas         Electronically signed by:Stacey Chopra MD  Oct 25 2017  8:46AM EST Acknowledgement

## 2018-01-16 NOTE — RESULT NOTES
Verified Results  (1) CBC/PLT/DIFF [id number] 09:32AM Preston Doylenatanael   REPORT COMMENT:  FASTING:YES     Test Name Result Flag Reference   WHITE BLOOD CELL COUNT 8 1 Thousand/uL  3 8-10 8   RED BLOOD CELL COUNT 4 61 Million/uL  3 80-5 10   HEMOGLOBIN 13 6 g/dL  11 7-15 5   HEMATOCRIT 40 5 %  35 0-45 0   MCV 87 9 fL  80 0-100 0   MCH 29 6 pg  27 0-33 0   MCHC 33 7 g/dL  32 0-36 0   RDW 13 4 %  11 0-15 0   PLATELET COUNT 593 Thousand/uL  140-400   MPV 8 1 fL  7 5-12 5   ABSOLUTE NEUTROPHILS 4698 cells/uL  0013-7453   ABSOLUTE LYMPHOCYTES 2349 cells/uL  850-3900   ABSOLUTE MONOCYTES 721 cells/uL  200-950   ABSOLUTE EOSINOPHILS 300 cells/uL     ABSOLUTE BASOPHILS 32 cells/uL  0-200   NEUTROPHILS 58 0 %     LYMPHOCYTES 29 0 %     MONOCYTES 8 9 %     EOSINOPHILS 3 7 %     BASOPHILS 0 4 %       (1) COMPREHENSIVE METABOLIC PANEL [id number] 00:79KT Preston Doylenatanael   REPORT COMMENT:  FASTING:YES     Test Name Result Flag Reference   GLUCOSE 91 mg/dL  65-99   Fasting reference interval   UREA NITROGEN (BUN) 15 mg/dL  7-25   CREATININE 1 07 mg/dL  0 50-1 10   eGFR NON-AFR   AMERICAN 63 mL/min/1 73m2  > OR = 60   eGFR AFRICAN AMERICAN 73 mL/min/1 73m2  > OR = 60   BUN/CREATININE RATIO   9-54   NOT APPLICABLE (calc)   SODIUM 140 mmol/L  135-146   POTASSIUM 3 9 mmol/L  3 5-5 3   CHLORIDE 107 mmol/L     CARBON DIOXIDE 25 mmol/L  20-31   CALCIUM 9 1 mg/dL  8 6-10 2   PROTEIN, TOTAL 6 8 g/dL  6 1-8 1   ALBUMIN 3 8 g/dL  3 6-5 1   GLOBULIN 3 0 g/dL (calc)  1 9-3 7   ALBUMIN/GLOBULIN RATIO 1 3 (calc)  1 0-2 5   BILIRUBIN, TOTAL 0 4 mg/dL  0 2-1 2   ALKALINE PHOSPHATASE 74 U/L     AST 11 U/L  10-35   ALT 9 U/L  6-29     (1) LIPID PANEL, FASTING [id number] 09:32AM Preston Doylenatanael     Test Name Result Flag Reference   CHOLESTEROL, TOTAL 219 mg/dL H 125-200   HDL CHOLESTEROL 36 mg/dL L > OR = 46   TRIGLICERIDES 273 mg/dL H <150   LDL-CHOLESTEROL 151 mg/dL (calc) H <130   Desirable range <100 mg/dL for patients with CHD or  diabetes and <70 mg/dL for diabetic patients with  known heart disease  CHOL/HDLC RATIO 6 1 (calc) H < OR = 5 0   NON HDL CHOLESTEROL 183 mg/dL (calc) H    Target for non-HDL cholesterol is 30 mg/dL higher than   LDL cholesterol target       (Q) TSH, 3RD GENERATION [id number] 09:32AM Jessica Sanchez   REPORT COMMENT:  FASTING:YES     Test Name Result Flag Reference   TSH 2 32 mIU/L     Reference Range                         > or = 20 Years  0 40-4 50                              Pregnancy Ranges            First trimester    0 26-2 66            Second trimester   0 55-2 73            Third trimester    0 43-2 91

## 2018-01-22 VITALS
BODY MASS INDEX: 31.82 KG/M2 | SYSTOLIC BLOOD PRESSURE: 140 MMHG | HEIGHT: 66 IN | RESPIRATION RATE: 18 BRPM | WEIGHT: 198 LBS | DIASTOLIC BLOOD PRESSURE: 92 MMHG | HEART RATE: 93 BPM

## 2018-01-22 VITALS
BODY MASS INDEX: 31.5 KG/M2 | RESPIRATION RATE: 22 BRPM | OXYGEN SATURATION: 98 % | DIASTOLIC BLOOD PRESSURE: 72 MMHG | HEART RATE: 85 BPM | TEMPERATURE: 98.9 F | HEIGHT: 66 IN | SYSTOLIC BLOOD PRESSURE: 130 MMHG | WEIGHT: 196 LBS

## 2018-01-22 VITALS
OXYGEN SATURATION: 99 % | TEMPERATURE: 100.4 F | RESPIRATION RATE: 18 BRPM | BODY MASS INDEX: 31.5 KG/M2 | HEART RATE: 108 BPM | WEIGHT: 196 LBS | DIASTOLIC BLOOD PRESSURE: 78 MMHG | HEIGHT: 66 IN | SYSTOLIC BLOOD PRESSURE: 128 MMHG

## 2018-01-23 VITALS
SYSTOLIC BLOOD PRESSURE: 152 MMHG | BODY MASS INDEX: 31.93 KG/M2 | DIASTOLIC BLOOD PRESSURE: 93 MMHG | HEART RATE: 107 BPM | WEIGHT: 197.8 LBS

## 2018-01-23 NOTE — RESULT NOTES
Verified Results  (Q) TSH, 3RD GENERATION [id number] 10:27AM Mike Ply   REPORT COMMENT:  FASTING:YES     Test Name Result Flag Reference   TSH 2 27 mIU/L     Reference Range                         > or = 20 Years  0 40-4 50                              Pregnancy Ranges            First trimester    0 26-2 66            Second trimester   0 55-2 73            Third trimester    0 43-2 91

## 2018-01-23 NOTE — RESULT NOTES
Verified Results  (1) LIPID PANEL, FASTING [id number] 10:27AM Sara Olivier     Test Name Result Flag Reference   CHOLESTEROL, TOTAL 199 mg/dL  <200   HDL CHOLESTEROL 39 mg/dL L >78   TRIGLICERIDES 128 mg/dL  <150   LDL-CHOLESTEROL 132 mg/dL (calc) H    Reference range: <100     Desirable range <100 mg/dL for patients with CHD or  diabetes and <70 mg/dL for diabetic patients with  known heart disease  LDL-C is now calculated using the Daryl-Gore   calculation, which is a validated novel method providing   better accuracy than the Friedewald equation in the   estimation of LDL-C  Shaina Kennedy  [facility]  9918;086(51): 7774-9427   (http://[employer]/faq/CKK709)   CHOL/HDLC RATIO 5 1 (calc) H <5 0   NON HDL CHOLESTEROL 160 mg/dL (calc) H <130   For patients with diabetes plus 1 major ASCVD risk   factor, treating to a non-HDL-C goal of <100 mg/dL   (LDL-C of <70 mg/dL) is considered a therapeutic   option  (1) COMPREHENSIVE METABOLIC PANEL [id number] 00:48TI Sara Olivier     Test Name Result Flag Reference   GLUCOSE 83 mg/dL  65-99   Fasting reference interval   UREA NITROGEN (BUN) 9 mg/dL  7-25   CREATININE 0 85 mg/dL  0 50-1 10   eGFR NON-AFR   AMERICAN 83 mL/min/1 73m2  > OR = 60   eGFR AFRICAN AMERICAN 96 mL/min/1 73m2  > OR = 60   BUN/CREATININE RATIO   8-97   NOT APPLICABLE (calc)   SODIUM 138 mmol/L  135-146   POTASSIUM 4 5 mmol/L  3 5-5 3   CHLORIDE 106 mmol/L     CARBON DIOXIDE 25 mmol/L  20-31   CALCIUM 9 1 mg/dL  8 6-10 2   PROTEIN, TOTAL 7 2 g/dL  6 1-8 1   ALBUMIN 3 9 g/dL  3 6-5 1   GLOBULIN 3 3 g/dL (calc)  1 9-3 7   ALBUMIN/GLOBULIN RATIO 1 2 (calc)  1 0-2 5   BILIRUBIN, TOTAL 0 3 mg/dL  0 2-1 2   ALKALINE PHOSPHATASE 73 U/L     AST 12 U/L  10-35   ALT 8 U/L  6-29     (1) CBC/PLT/DIFF [id number] 10:27AM Sara Pean   REPORT COMMENT:  FASTING:YES     Test Name Result Flag Reference   WHITE BLOOD CELL COUNT 6 4 Thousand/uL  3 8-10 8   RED BLOOD CELL COUNT 4 56 Million/uL  3 80-5 10   HEMOGLOBIN 13 0 g/dL  11 7-15 5   HEMATOCRIT 38 3 %  35 0-45 0   MCV 84 0 fL  80 0-100 0   MCH 28 5 pg  27 0-33 0   MCHC 33 9 g/dL  32 0-36 0   RDW 13 8 %  11 0-15 0   PLATELET COUNT 530 Thousand/uL  140-400   ABSOLUTE NEUTROPHILS 3802 cells/uL  1049-0750   ABSOLUTE LYMPHOCYTES 1798 cells/uL  850-3900   ABSOLUTE MONOCYTES 499 cells/uL  200-950   ABSOLUTE EOSINOPHILS 250 cells/uL     ABSOLUTE BASOPHILS 51 cells/uL  0-200   NEUTROPHILS 59 4 %     LYMPHOCYTES 28 1 %     MONOCYTES 7 8 %     EOSINOPHILS 3 9 %     BASOPHILS 0 8 %     MPV 10 0 fL  7 5-12 5     (1) COMPREHENSIVE METABOLIC PANEL [id number] 35:13FO [employer]     Test Name Result Flag Reference   GLUCOSE,RANDM 83     SODIUM 138     POTASSIUM 4 5     CHLORIDE 106     CARBON DIOXIDE 25     BLOOD UREA NITROGEN 9     CREATININE 0 85     CALCIUM 9 1     BILI, TOTAL 0 3     ALK PHOSPHATAS 73     ALT (SGPT) 8     AST(SGOT) 12     ALBUMIN 3 9     TOTAL PROTEIN 7 2     eGFR  96     eGFR Non- 83       (1) CBC/PLT/DIFF [id number] 08:47AM [employer]     Test Name Result Flag Reference   WBC COUNT 6 4     RBC COUNT 4 56     HEMOGLOBIN 13 0     HEMATOCRIT 38 3     MCV 84 0     MCH 28 5     MCHC 33 9     RDW 13 8     MPV 10 0     PLATELET COUNT 153     NEUTROPHILS RELATIVE PERCENT 59 4     LYMPHOCYTES RELATIVE PERCENT 28 1     MONOCYTES RELATIVE PERCENT 7 8     EOSINOPHILS RELATIVE PERCENT 3 9     BASOPHILS RELATIVE PERCENT 0 8     NEUTROPHILS ABSOLUTE COUNT 3802     LYMPHOCYTES ABSOLUTE COUNT 1798     MONOCYTES ABSOLUTE COUNT 499     EOSINOPHILS ABSOLUTE COUNT 250     BASOPHILS ABSOLUTE COUNT 51       (1) LIPID PANEL, FASTING [id number] 08:46AM [employer]     Test Name Result Flag Reference   CHOLESTEROL 199     HDL,DIRECT 39 L    LDL CHOLESTEROL CALCULATED 132 H    TRIGLYCERIDES 146     TCHOL/HDL RATIO 5 1 H

## 2018-01-23 NOTE — PROGRESS NOTES
Assessment   1  Chronic migraine without aura (346 70) (G43 709)  2  Chronic tension-type headache, intractable (339 12) (G44 221)  3  Menstrual migraine without status migrainosus, not intractable (346 40) (G43 829)  4  Migraine, unspecified, not intractable, without status migrainosus (346 90) (G43 909)  5  Stabbing headache (339 85) (G44 85)    Plan  Chronic migraine without aura, Chronic tension-type headache, intractable, Menstrual  migraine without status migrainosus, not intractable    · Renew: Ketorolac Tromethamine 10 MG Oral Tablet; take 1 tablet every 8 hours as  needed for pain  Rx By: Carola Abreu; Dispense: 30 Days ; #:12 Tablet; Refill: 3;For: Chronic migraine   without aura, Chronic tension-type headache, intractable, Menstrual migraine without   status migrainosus, not intractable; KALEIGH = N; Verified Transmission to   [facility]; Last Updated By: System, [employer]; 11/28/2016   10:31:00 AM  Chronic migraine without aura, Chronic tension-type headache, intractable, Menstrual  migraine without status migrainosus, not intractable, Migraine, unspecified, not  intractable, without status migrainosus    · Start: Topiramate 25 MG Oral Tablet; 1 tab qhs x 5 days, then 2 tabs qhs x 5 days, then  3 tablets qhs x 5 days, then 4 tab qhs  Rx By: Carola Abreu; Dispense: 30 Days ; #:120 Tablet;  Refill: 3;For: Chronic migraine   without aura, Chronic tension-type headache, intractable, Menstrual migraine without   status migrainosus, not intractable, Migraine, unspecified, not intractable, without status   migrainosus; KALEIGH = N; Verified Transmission to [facility]; Last Updated By: System, [employer]; 11/28/2016 10:24:17 AM  Chronic migraine without aura, Chronic tension-type headache, intractable, Menstrual  migraine without status migrainosus, not intractable, Migraine, unspecified, not  intractable, without status migrainosus, Stabbing headache    · Follow-up visit in 3 months Evaluation and Treatment  Follow-up  Status: Complete   Done: 70TNV5290  Ordered; For: Chronic migraine without aura, Chronic tension-type headache, intractable,   Menstrual migraine without status migrainosus, not intractable, Migraine, unspecified,   not intractable, without status migrainosus, Stabbing headache;  Ordered By:   Azeem Reynolds  Performed:   Due: 77NGL5030; Last Updated By:   Jess Miller; 11/28/2016 10:36:53 AM    Discussion/Summary  Discussion Summary:   Topamax conpletely resolved headaches except menstrual migraine therefore will titrate off zonisamide in reverse of how titrated on medication, as titrating off zonisamide can titrate on topamax  If develops side effects on topamax will restart zonisamide at higher dose i e  150mg  Told continue abortive medications  Follow up in 3 months  Contact our office with questions/concerns  Contact our office and/or go to ER with new/change in symptoms  Medication Side Effects Reviewed: Possible side effects of new medications were reviewed with the patient/guardian today  Patient Guardian understands agrees: The treatment plan was reviewed with the patient/guardian  The patient/guardian understands and agrees with the treatment plan   Counseling Documentation With Imm: The patient was counseled regarding instructions for management, risk factor reductions, prognosis, patient and family education, impressions, risks and benefits of treatment options, importance of compliance with treatment  Headache [facility]:   The patient was counseled regarding;   Discussed side effects of all medications prescribed today to the patient in detail  see above   Patient education was completed today and we also discussed precautions for rebound headaches  When patient has a moderate to severe headache, they should seek rest, initiate relaxation and apply cold compresses to the head  Also recommended to the patient :  1  Maintain regular sleep schedule  2  Limit over the counter medications  (No more than 3 times a week)  3  Maintain headache diary  4  Limit caffeine to 1-2 cups a day or less  5  Avoid dietary trigger  (list given to the patient and reviewed with them)  6  Patient is to have regular frequent meals to prevent headache onset  Chief Complaint  Chief Complaint Free Text Note Form: headaches      History of Present Illness  HPI: We had the pleasure of evaluating Katelyn Jones in neurological follow up today  As you know she is a 41year old right handed female  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  Since last seen headaches have worsened off topamax, while on topamax had no headaches except menstrual migraines  What is your current pain level - 5/10  Headaches started at what age - 16years of age  Accident or injury prior to onset of headaches - no  How often do the headaches occur - TTH- daily; migraines- 1-2 times a week  What time of the day do the headaches start - varies   How long do the headaches last - TTH- constant; migraines 1-10 days  Are you ever headache free - no  Where are they located - start left side of head, frontal region, occipital, neck  What is the intensity of pain -TTH- average pain level 5/10; migraines- average pain level 10/10  Any warning prior to headache and how long do they last - none  Describe your usual headache - Pressure, sharp, dull  Associated symptoms: nausea, Decrease appetite, photophobia, phonophobia, sensitive to smells, Problem with concentration, left pupil size change, light-headed or dizzy, stiff or sore neck, prefer to be alone and in a dark room, unable to work, irritable, easily frustrated   Headaches are worse if the patient: cough, sneeze, bending over  Headache trigger: Fatigue, Stress/Tension, missing meals, chewing, lack of sleep, menstruation, weather  Are you current pregnant or planning on getting pregnant?  No done with family planning  What time of the year do headaches occur more frequently - fall may have more headaches  Have you seen someone else for headaches or pain - 20 years ago she saw someone   What medications do you take or have you taken for your headaches? PREVENTIVE: Vitamin B2 400mg, amlodipine, Pamalor, mag oxide, venlafaxine, gabapentin- side effects, cyproheptadine, verapamil, zonisamide, topamax- was effective  ABORTIVE: Treximet, maxalt, naproxen, dexamethasone, ibuprofen, toradol- did help break help  Non-Medical/Alternative Treatments used in the past for headaches: message      Review of Systems  Neurological ROS:   Constitutional: fatigue  HEENT:  no sinus problems, not feeling congested, no blurred vision, no dryness of the eyes, no eye pain, no hearing loss, no tinnitus, no mouth sores, no sore throat, no hoarseness, no dysphagia, no masses, no bleeding  Cardiovascular:  no chest pain or pressure, no palpitations present, the heart rate was not rapid or irregular, no swelling in the arms or legs, no poor circulation  Respiratory:  no unusual or persistant cough, no shortness of breath with or without exertion  Gastrointestinal:  no nausea, no vomiting, no diarrhea, no abdominal pain, no changes in bowel habits, no melena, no loss of bowel control  Genitourinary:  no incontinence, no feelings of urinary urgency, no increase in frequency, no urinary hesitancy, no dysuria, no hematuria  Musculoskeletal: myalgias and head/neck/back pain  Integumentary  no masses, no rash, no skin lesions, no livedo reticularis  Psychiatric:  no anxiety, no depression, no mood swings, no psychiatric hospitalizations, no sleep problems  Endocrine  no unusual weight loss or gain, no excessive urination, no excessive thirst, no hair loss or gain, no hot or cold intolerance, no menstrual period change or irregularity, no loss of sexual ability or drive, no erection difficulty, no nipple discharge     Hematologic/Lymphatic:  no unusual bleeding, no tendency for easy bruising, no clotting skin or lumps  Neurological General: headache  Neurological Mental Status:  no confusion, no mood swings, no alteration or loss of consciousness, no difficulty expressing/understanding speech, no memory problems  Neurological Cranial Nerves:  no blurry or double vision, no loss of vision, no face drooping, no facial numbness or weakness, no taste or smell loss/changes, no hearing loss or ringing, no vertigo or dizziness, no dysphagia, no slurred speech  Neurological Motor findings include:  no tremor, no twitching, no cramping(pre/post exercise), no atrophy  Neurological Coordination:  no unsteadiness, no vertigo or dizziness, no clumsiness, no problems reaching for objects  Neurological Sensory:  no numbness, no pain, no tingling, does not fall when eyes closed or taking a shower  Neurological Gait:  no difficulty walking, not falling to one side, no sensation of being pushed, has not had falls  ROS Reviewed:   ROS reviewed  Active Problems   1  Acute bacterial rhinosinusitis (461 9) (J01 90,B96 89)  2  Allergic rhinosinusitis (477 9) (J30 9)  3  Asthma (493 90) (J45 909)  4  Bronchitis (490) (J40)  5  Bruxism (306 8) (F45 8)  6  Cellulitis of foot (682 7) (L03 119)  7  Chronic migraine without aura (346 70) (G43 709)  8  Chronic sinusitis (473 9) (J32 9)  9  Chronic tension-type headache, intractable (339 12) (G44 221)  10  Dermatitis (692 9) (L30 9)  11  Fatigue (780 79) (R53 83)  12  Headache, chronic daily (784 0) (R51)  13  Heartburn (787 1) (R12)  14  Hypertension (401 9) (I10)  15  Menstrual migraine without status migrainosus, not intractable (346 40) (G43 829)  16  Migraine, unspecified, not intractable, without status migrainosus (346 90) (G43 909)  17  Need for prophylactic vaccination and inoculation against influenza (V04 81) (Z23)  18  Obstructive sleep apnea (327 23) (G47 33)  19  Stabbing headache (339 85) (G44 85)    Past Medical History   1   History of Anxiety (300 00) (F41 9)  2  History of Endometriosis (617 9) (N80 9)  3  History of High cholesterol (272 0) (E78 00)  4  History of acute sinusitis (V12 69) (Z87 09)  5  History of acute sinusitis (V12 69) (Z87 09)  6  History of esophageal reflux (V12 79) (Z87 19)  7  Need for prophylactic vaccination and inoculation against influenza (V04 81) (Z23)  8  History of Plantar fasciitis (728 71) (M72 2)  Active Problems And Past Medical History Reviewed: The active problems and past medical history were reviewed and updated today  Surgical History   1  History of  Section  2  History of Foot Surgery Left  3  History of Laparoscopy Of Uterus  Surgical History Reviewed: The surgical history was reviewed and updated today  Family History  Mother   1  Family history of colon cancer (V16 0) (Z80 0)  2  Family history of essential hypertension (V17 49) (Z82 49)  3  Family history unobtainable  4  Family history of Living and Healthy  Father   5  Family history of colon cancer (V16 0) (Z80 0)  6  Family history of essential hypertension (V17 49) (Z82 49)  7  Family history unobtainable  Maternal Grandmother   8  Family history of Colon Cancer (V16 0)  9  Family history of colon cancer (V16 0) (Z80 0)  10  Family history unobtainable  Paternal Grandmother   6  Family history unobtainable  Maternal Grandfather   12  Family history unobtainable  Paternal Grandfather   15  Family history unobtainable  Maternal Uncle   14  Family history of Cancer  Family History   15  Family history of Asperger's disorder  16  Family history of Diabetes Mellitus (V18 0)  17  Family history of bipolar disorder (V17 0) (Z81 8)  18  Family history of depression (V17 0) (Z81 8)  19  Family history of Hypertension (V17 49)  20  Family history of Raynaud's phenomenon  Family History Reviewed: The family history was reviewed and updated today         Social History    · Adopted   · Being A Social Drinker   · Caffeine use (V49 89) (F15 90)   ·    · Never A Smoker   · No drug use   · Two children  Social History Reviewed: The social history was reviewed and updated today  The social history was reviewed and is unchanged  Current Meds  1  Allegra 180 MG TABS; Therapy: (Recorded:77Kie3391) to Recorded  2  Azithromycin 250 MG Oral Tablet; TAKE 2 TABLETS ON DAY 1 THEN TAKE 1 TABLET A   DAY FOR 4 DAYS; Therapy: 94Jdx8299 to (Last Rx:80Vjr9429)  Requested for: 22VVR5808 Ordered  3  Ketorolac Tromethamine 10 MG Oral Tablet; take 1 tablet every 8 hours as needed for   pain; Therapy: 19Fqm9339 to (Evaluate:54Uzr2422)  Requested for: 09Trr8568; Last   Rx:51Eqi6146 Ordered  4  Promethazine-Codeine 6 25-10 MG/5ML Oral Syrup; TAKE 5 ML BY MOUTH AT BEDTIME   AS NEEDED; Therapy: 41Rlc1274 to (Evaluate:14Jan2016); Last Rx:11Ili0468 Ordered  5  Rizatriptan Benzoate 10 MG Oral Tablet; TAKE 1 TABLET AT ONSET OF HEADACHE, MAY   REPEAT IN 2 HOURS  MAX 2 TABLETS IN 24 HOURS; Therapy: 33JSZ3398 to (Last Rx:73Mgo8118)  Requested for: 69Zkn2771 Ordered  6  TiZANidine HCl - 2 MG Oral Tablet; TAKE 1 TABLET AT BEDTIME FOR 5 NIGHTS THEN 2   TABLET THEREAFTER; Therapy: 20PSH0110 to (Db Ye)  Requested for: 78NAG3143; Last   Rx:95Xjx4400 Ordered  7  Venlafaxine HCl ER 75 MG Oral Tablet Extended Release 24 Hour; take 1 tablet by mouth   once daily; Therapy: 30HJE0379 to (Evaluate:10Mar2017)  Requested for: 16KCO2964; Last   Rx:00Fch4465 Ordered  8  Verapamil HCl - 40 MG Oral Tablet; 1 tablet twice a day; Therapy: 36ETN9298 to (Evaluate:55Cgx1044)  Requested for: 27KFF7185; Last   Rx:25Mar2016 Ordered  9  Zantac TABS; Therapy: (Recorded:44Ost0300) to Recorded    Allergies   1  No Known Drug Allergies   2   Seasonal    Vitals  Signs   Recorded: 83XKX3067 10:10AM   Heart Rate: 112  Respiration: 16  Systolic: 422  Diastolic: 96  Weight: 686 lb 6 oz  BMI Calculated: 32 66  BSA Calculated: 2 01    Physical Exam    Constitutional General appearance: No acute distress, well appearing and well nourished  Musculoskeletal   Gait and station: Normal gait, stance and balance  Muscle strength: Normal strength throughout  Muscle tone: No atrophy, abnormal movements, flaccidity, cogwheeling or spasticity  Neurologic   Orientation to person, place, and time: Normal     2nd cranial nerve: Normal     3rd, 4th, and 6th cranial nerves: Normal     5th cranial nerve: Normal     7th cranial nerve: Normal     8th cranial nerve: Normal     9th cranial nerve: Normal     11th cranial nerve: Normal     12th cranial nerve: Normal     Sensation: Normal     Reflexes: Normal     Coordination: Normal     Mood and affect: Normal        Attending Note  Collaborating Physician Note: Collaborating Note:1  I agree with the Advanced Practitioner note1         1 Amended By: Ernesto Edwards; Nov 28 2016 12:18 PM EST    Message  Return to work or school:   Nettie Saxena is under my professional care  She was seen in my office on 11/28/16       Please allow patient to have different chair to provide better support for neck/back to help prevent migraines thus allowing patient to function at full capacity and work efficiently  Caty Dykes PA-C  Future Appointments    Date/Time Provider Specialty Site   12/20/2016 08:30 AM MABEL Hernandez   [address] [place] [address]   02/28/2017 11:15 AM Maci Lopez, [facility] [address]     Signatures   Electronically signed by : Caty Dykes [facility]; Nov 28 2016 10:52AM EST                       (Author)    Electronically signed by : Michael Faulkner MD; Nov 28 2016 12:18PM EST                       (Co-participant)

## 2018-01-23 NOTE — PROGRESS NOTES
Assessment    1  Encounter for preventive health examination (V70 0) (Z00 00)   2  Hypertension (401 9) (I10)   3  Hyperlipidemia (272 4) (E78 5)   4  Anxiety (300 00) (F41 9)    Plan  Anxiety    · Avoid foods and beverages that contain caffeine ; Status:Complete;   Done: 89KGU3201   · Be sure to get at least 8 hours of sleep every night ; Status:Complete;   Done: 84FXX5174   · Decreasing the stress in your life may help your condition improve ; Status:Complete;    Done: 42DZL3657   · Call [phone] if: The symptoms seem worse ; Status:Complete;   Done:  69XCH4178   · Call [phone] if: Your feelings of being frightened, nervous, anxious, and out of  control are happening more often ; Status:Complete;   Done: 20Ecl8752  Anxiety, Hyperlipidemia, Hypertension    · (1) CBC/PLT/DIFF; Status:Active; Requested for:13Esb6572;   Hyperlipidemia    · Atorvastatin Calcium 10 MG Oral Tablet; TAKE 1 TABLET DAILY   · Begin or continue regular aerobic exercise  Gradually work up to at least 3 sessions of 30  minutes of exercise a week ; Status:Complete;   Done: 95Nsn6401   · Eat no more than 30 grams of fat per day ; Status:Complete;   Done: 28Cga9141   · There are many exercise options for seniors ; Status:Complete;   Done: 11MSU8311   · Call [phone] if: You have muscle cramps ; Status:Complete;   Done: 90OSU9258   · Call [phone] if: You have pain in the stomach area ; Status:Complete;   Done:  66HUY6458   · Call [phone] if: You start vomiting ; Status:Complete;   Done: 35YVU1049   · Call 911 if: You experience a new kind of chest pain (angina) or pressure ;  Status:Complete;   Done: 44PWT5608   · Call 911 if: You have any symptoms of a stroke ; Status:Complete;   Done: 05ALH2019   · (1) COMPREHENSIVE METABOLIC PANEL; Status:Active;  Requested ABDUL:55PFH3210;   Hypertension    · A diet low in sodium and high in potassium, magnesium, and calcium can help your  blood pressure ; Status:Complete; Done: 62YLB6951   · Begin a limited exercise program ; Status:Complete;   Done: 32Ssz9002   · Eat a low fat and low cholesterol diet ; Status:Complete;   Done: 39QCI6496   · We encourage you to begin to make lifestyle changes to help control your blood  pressure  These may include losing weight, increasing your activity level, limiting salt in  your diet, decreasing alcohol intake, and eating a diet low in fat and rich in fruits  and vegetables ; Status:Complete;   Done: 50LOE6256   · Call [phone] if: You become dizzy or lightheaded, especially when you stand up  after sitting for a while ; Status:Complete;   Done: 78SGB6259   · Call [phone] if: You develop double vision (see two of everything) ;  Status:Complete;   Done: 91Zpm2254   · Seek Immediate Medical Attention if: You have a severe headache that will not go away ;  Status:Complete;   Done: 33Env7587   · Seek Immediate Medical Attention if: Your blood pressure is greater than 250/120 for 2  consecutive readings ; Status:Complete;   Done: 36BZI5171    Discussion/Summary  health maintenance visit Currently, she eats an adequate diet and has an inadequate exercise regimen  cervical cancer screening is current Breast cancer screening: mammogram is needed every year  Colorectal cancer screening: colorectal cancer screening is not indicated  Osteoporosis screening: bone mineral density testing is not indicated  Screening lab work includes Up-to-date see discussion  Advice and education were given regarding nutrition, aerobic exercise, weight bearing exercise, calcium supplements, vitamin D supplements and sunscreen use  Patient discussion: discussed with the patient  She is up-to-date on her screening tests  She did follow-up with gyn and is up-to-date on her Pap smear  Getting her mammogram  Up-to-date on her blood work  Discussed with her her recent blood work  Cholesterol is significantly elevated and persistently so   Will have her start on atorvastatin 10 mg 1 daily  Discussed potential side effects with her  Will have her have repeat CMP about 6-8 weeks after starting this medication  Will have her get full laboratory testing done in about 3-4 months to assess if the dosage of this medication needs to be adjusted  Blood pressure controlled  Continue the for up until as previously  Continue to follow up with Neurology regarding her chronic headaches  Discussed with her possibly starting some mild muscle relaxant especially toward the evening which may help with some of the neck symptoms which may be worsening some of her headaches  Prescription given for the methocarbamol as noted above  Will have her follow up in about 3-4 months or sooner if needed  Possible side effects of new medications were reviewed with the patient/guardian today  The treatment plan was reviewed with the patient/guardian  The patient/guardian understands and agrees with the treatment plan      History of Present Illness  , Adult Female: The patient is being seen for a health maintenance evaluation  Social History: She is   Work status: working full time  General Health: The patient's health since the last visit is described as good  She has regular dental visits  She denies vision problems  She denies hearing loss  Lifestyle:  She consumes a diverse and healthy diet  She does not have any weight concerns  She does not exercise regularly  She does not use tobacco  She denies alcohol use  She denies drug use  Reproductive health: the patient is premenopausal   she reports normal menses  she is sexually active  Screening: Cervical cancer screening includes a pap smear performed 9/19/17 and human papilloma virus screening performed 9/19/17  Breast cancer screening includes a mammogram performed last year, a clinical breast exam performed 9/19/17 and Will be done on 9/21/17  She hasn't been previously screened for colorectal cancer     Metabolic screening includes lipid profile performed 9/16/17, glucose screening performed 9/16/17, thyroid function test performed 9/16/17 and no previous DEXA  HPI: She presents for wellness visit  Has generally been doing about the same  She continues to have headaches but she feels these are often related to her not sleeping well as well as possibly related to some tightness into the neck with positional changes  She feels that chair that she sits on may be playing a role with this  Muscles do get tight into the neck  Appetite has been generally good  Denies any nausea, vomiting, or diarrhea  She does, however, get occasional nausea with her headaches  Tolerating her verapamil without difficulty  Denies any localized weakness  Has had some additional stressors especially through work  Some stressors at home as well  She has been watching her diet but admits not as closely as she would want  She has cut back on the fatty foods  Denies any chest pain or palpitations  Continues to follow-up with Neurology on a regular basis  Allergies have been relatively well controlled  Did follow-up with gynecology over the last few days  Did have a Pap smear done  Will be getting her mammogram done  Denies any significant vision changes  Bowel movements have remained normal       Review of Systems    Constitutional: feeling tired  Eyes: dryness of the eyes  Cardiovascular: no chest pain and no palpitations  Respiratory: no shortness of breath  Gastrointestinal: as noted in HPI  Genitourinary: no dysuria  Musculoskeletal: as noted in HPI  Neurological: as noted in HPI  Psychiatric: as noted in HPI  ROS reviewed  Active Problems    1  Anxiety (300 00) (F41 9)   2  Asthma (493 90) (J45 909)   3  Cellulitis of foot (682 7) (L03 119)   4  Chronic sinusitis (473 9) (J32 9)   5  Dermatitis (692 9) (L30 9)   6  Encounter for routine gynecological examination with Papanicolaou smear of cervix   (V72 31,V76 2) (Z01 419)   7  Encounter for screening mammogram for malignant neoplasm of breast (V76 12)   (Z12 31)   8  Fatigue (780 79) (R53 83)   9  Heartburn (787 1) (R12)   10  Hyperlipidemia (272 4) (E78 5)   11  Hypertension (401 9) (I10)   12  Hypokalemia (276 8) (E87 6)   13  Menorrhagia with irregular cycle (626 2) (N92 1)   14   Nonspecific abnormal results of function study of thyroid (794 5) (R94 6)    Past Medical History    · History of Anxiety (300 00) (F41 9)   · History of Endometriosis (617 9) (N80 9)   · History of High cholesterol (272 0) (E78 00)   · History of acute sinusitis (V12 69) (Z87 09)   · History of esophageal reflux (V12 79) (Z87 19)   · History of Plantar fasciitis (728 71) (M72 2)    Surgical History    · History of  Section   · History of Foot Surgery Left   · History of Laparoscopy Of Uterus    Family History  Mother    · Family history of colon cancer (V16 0) (Z80 0)   · Family history of essential hypertension (V17 49) (Z82 49)   · Family history unobtainable   · Family history of Living and Healthy  Father    · Family history of colon cancer (V16 0) (Z80 0)   · Family history of essential hypertension (V17 49) (Z82 49)   · Family history unobtainable  Maternal Grandmother    · Family history of Colon Cancer (V16 0)   · Family history of colon cancer (V16 0) (Z80 0)   · Family history unobtainable  Paternal Grandmother    · Family history unobtainable  Maternal Grandfather    · Family history unobtainable  Paternal Grandfather    · Family history unobtainable  Maternal Uncle    · Family history of Cancer  Family History    · Family history of Asperger's disorder   · Family history of Diabetes Mellitus (V18 0)   · Family history of bipolar disorder (V17 0) (Z81 8)   · Family history of depression (V17 0) (Z81 8)   · Family history of Hypertension (V17 49)   · Family history of Raynaud's phenomenon    Social History    · Adopted   · Being A Social Drinker   · Caffeine use (V49 89) (F15 90)   ·    · Never A Smoker   · No drug use   · Two children    Current Meds   1  Allegra 180 MG TABS; Therapy: (Recorded:43Ism9383) to Recorded   2  Fluticasone Propionate 50 MCG/ACT Nasal Suspension; USE 2 SPRAYS IN EACH   NOSTRIL ONCE DAILY; Therapy: 20DRW5887 to (Last Rx:06Apr2017)  Requested for: 06Apr2017 Ordered   3  Magnesium 200 MG Oral Tablet; Therapy: (Recorded:06Jun2017) to Recorded   4  Methocarbamol 500 MG Oral Tablet; take 1 tab qhs x 2 weeks, then 1 tab qOD x 1 week,   then prn spasm or headache; Therapy: 50BWZ4152 to (Evaluate:74Ioo9482)  Requested for: 06JDA1843; Last   Rx:06Jun2017 Ordered   5  Rizatriptan Benzoate 10 MG Oral Tablet; TAKE 1 TABLET AT ONSET OF HEADACHE,   MAY REPEAT IN 2 HOURS  MAX 2 TABLETS IN 24 HOURS; Therapy: 09DYA3000 to (Last Rx:79Wpl2872)  Requested for: 28Pca6618 Ordered   6  SUMAtriptan Succinate 6 MG/0 5ML Subcutaneous Solution Auto-injector; INJECT 0 5ML   AT ONSET OF HEADACHE REPEAT IN 2 HRS, MAX 2   PER DAY  NO MORE THEN 2 DAYS/WEEK; Therapy: 83GXH9924 to (Evaluate:27Jun2017)  Requested for: 17NZX8207; Last   DP:56YUA8330 Ordered   7  Topiramate 100 MG Oral Tablet; TAKE 2 TABLETS AT BEDTIME; Therapy: 89VJN5376 to (Madelyn Paz)  Requested for: 89Zww5506; Last   Rx:15Wgm2775 Ordered   8  Topiramate 50 MG Oral Tablet; 1 tab qhs x 5 days, then 2 tabs qhs with 100mg tablet; Therapy: (Recorded:06Apr2017) to Recorded   9  Venlafaxine HCl ER 75 MG Oral Capsule Extended Release 24 Hour; take 1 capsule   daily; Therapy: 73Mdb1922 to (Last Rx:76Byr5781)  Requested for: 21Wot0754 Ordered   10  Verapamil HCl  MG Oral Tablet Extended Release; TAKE 1 TABLET BY MOUTH    ONCE DAILY; Therapy: 90Wqf9178 to (Mike Balzarine)  Requested for: 26Aly1850; Last    Rx:62Vmj7393 Ordered   11  Zantac TABS; Therapy: (Recorded:75Fbq5422) to Recorded    Allergies    1  No Known Drug Allergies    2   Seasonal    Vitals   Recorded: 33XVF2620 08:00AM   Temperature 98 9 F, Temporal   Heart Rate 85   Respiration 22   Systolic 793, LUE, Sitting   Diastolic 72, LUE, Sitting   Height 5 ft 6 in   Weight 196 lb    BMI Calculated 31 64   BSA Calculated 1 98   O2 Saturation 98     Physical Exam    Constitutional   General appearance: No acute distress, well appearing and well nourished  Head and Face   Head and face: Normal     Palpation of the face and sinuses: No sinus tenderness  Eyes   Conjunctiva and lids: No swelling, erythema or discharge  Pupils and irises: Equal, round, reactive to light  Ears, Nose, Mouth, and Throat   External inspection of ears and nose: Normal     Otoscopic examination: Tympanic membranes translucent with normal light reflex  Canals patent without erythema  Oropharynx: Normal with no erythema, edema, exudate or lesions  Neck   Thyroid: Normal, no thyromegaly  Pulmonary   Auscultation of lungs: Clear to auscultation  Cardiovascular   Auscultation of heart: Normal rate and rhythm, normal S1 and S2, no murmurs  Examination of extremities for edema and/or varicosities: Normal     Lymphatic   Palpation of lymph nodes in neck: No lymphadenopathy  Musculoskeletal   Gait and station: Normal     Joints, bones, and muscles: Abnormal   Slight tightness to the paraspinal muscles in the neck with slight tenderness  Psychiatric   Mood and affect: Abnormal   Slightly anxious        Results/Data  (1) CBC/PLT/DIFF 69UMT3509 10:18AM Janice Pate   REPORT COMMENT:  FASTING:YES     Test Name Result Flag Reference   WHITE BLOOD CELL COUNT 7 7 Thousand/uL  3 8-10 8   RED BLOOD CELL COUNT 4 59 Million/uL  3 80-5 10   HEMOGLOBIN 12 8 g/dL  11 7-15 5   HEMATOCRIT 38 1 %  35 0-45 0   MCV 83 0 fL  80 0-100 0   MCH 27 9 pg  27 0-33 0   MCHC 33 6 g/dL  32 0-36 0   RDW 12 9 %  11 0-15 0   PLATELET COUNT 073 Thousand/uL  140-400   ABSOLUTE NEUTROPHILS 4374 cells/uL  3917-7944   ABSOLUTE LYMPHOCYTES 2148 cells/uL  850-3900   ABSOLUTE MONOCYTES 739 cells/uL  200-950   ABSOLUTE EOSINOPHILS 370 cells/uL     ABSOLUTE BASOPHILS 69 cells/uL  0-200   NEUTROPHILS 56 8 %     LYMPHOCYTES 27 9 %     MONOCYTES 9 6 %     EOSINOPHILS 4 8 %     BASOPHILS 0 9 %     MPV 9 3 fL  7 5-12 5     (1) COMPREHENSIVE METABOLIC PANEL 98LPO4858 89:07HC [email or web address]     Test Name Result Flag Reference   GLUCOSE 92 mg/dL  65-99   Fasting reference interval   UREA NITROGEN (BUN) 11 mg/dL  7-25   CREATININE 0 89 mg/dL  0 50-1 10   eGFR NON-AFR  AMERICAN 78 mL/min/1 73m2  > OR = 60   eGFR AFRICAN AMERICAN 91 mL/min/1 73m2  > OR = 60   BUN/CREATININE RATIO   4-02   NOT APPLICABLE (calc)   SODIUM 137 mmol/L  135-146   POTASSIUM 3 5 mmol/L  3 5-5 3   CHLORIDE 108 mmol/L     CARBON DIOXIDE 21 mmol/L  20-31   CALCIUM 8 7 mg/dL  8 6-10 2   PROTEIN, TOTAL 6 9 g/dL  6 1-8 1   ALBUMIN 3 9 g/dL  3 6-5 1   GLOBULIN 3 0 g/dL (calc)  1 9-3 7   ALBUMIN/GLOBULIN RATIO 1 3 (calc)  1 0-2 5   BILIRUBIN, TOTAL 0 4 mg/dL  0 2-1 2   ALKALINE PHOSPHATASE 84 U/L     AST 12 U/L  10-35   ALT 9 U/L  6-29     (1) LIPID PANEL, FASTING 81Lxb9147 10:18AM [email or web address]     Test Name Result Flag Reference   CHOLESTEROL, TOTAL 258 mg/dL H <200   HDL CHOLESTEROL 45 mg/dL L >71   TRIGLICERIDES 481 mg/dL H <150   LDL-CHOLESTEROL 178 mg/dL (calc) H    Reference range: <100     Desirable range <100 mg/dL for patients with CHD or  diabetes and <70 mg/dL for diabetic patients with  known heart disease  LDL-C is now calculated using the Daryl-Gore   calculation, which is a validated novel method providing   better accuracy than the Friedewald equation in the   estimation of LDL-C  Tino Mijares  [facility] [place]  0048;[phone](98): [phone]   (http://[employer]/faq/ILE500)   CHOL/HDLC RATIO 5 7 (calc) H <5 0   NON HDL CHOLESTEROL 213 mg/dL (calc) H <130   For patients with diabetes plus 1 major ASCVD risk   factor, treating to a non-HDL-C goal of <100 mg/dL   (LDL-C of <70 mg/dL) is considered a therapeutic   option  (Q) TSH, 3RD GENERATION 89Tor7384 10:18AM Mike Mirza   REPORT COMMENT:  FASTING:YES     Test Name Result Flag Reference   TSH 2 37 mIU/L     Reference Range                         > or = 20 Years  0 40-4 50                              Pregnancy Ranges            First trimester    0 26-2 66            Second trimester   0 55-2 73            Third trimester    0 43-2 91       Future Appointments    Date/Time Provider Specialty Site   12/19/2017 08:30 AM Shivani Vincent MD Neurology [facility] NEUROLOGY [facility]  Jennifer Agueros   12/21/2017 08:00 AM MABEL Eckert   [address] [facility] PRIMARY CARE [facility] [address]     Signatures   Electronically signed by : MABEL Salgado ; Dec  8 2017 10:29AM EST                       (Author)

## 2018-03-06 DIAGNOSIS — G43.709 CHRONIC MIGRAINE WITHOUT AURA WITHOUT STATUS MIGRAINOSUS, NOT INTRACTABLE: Primary | ICD-10-CM

## 2018-03-06 RX ORDER — VENLAFAXINE HYDROCHLORIDE 75 MG/1
1 CAPSULE, EXTENDED RELEASE ORAL DAILY
COMMUNITY
Start: 2016-12-29 | End: 2018-03-06 | Stop reason: SDUPTHER

## 2018-03-06 RX ORDER — VENLAFAXINE HYDROCHLORIDE 75 MG/1
75 CAPSULE, EXTENDED RELEASE ORAL DAILY
Qty: 90 CAPSULE | Refills: 1 | Status: SHIPPED | OUTPATIENT
Start: 2018-03-06 | End: 2018-08-16 | Stop reason: SDUPTHER

## 2018-04-02 DIAGNOSIS — J45.909 ASTHMA DUE TO ENVIRONMENTAL ALLERGIES: Primary | ICD-10-CM

## 2018-04-02 RX ORDER — FLUTICASONE PROPIONATE 50 MCG
2 SPRAY, SUSPENSION (ML) NASAL DAILY
COMMUNITY
Start: 2017-04-06 | End: 2018-04-02 | Stop reason: SDUPTHER

## 2018-04-02 RX ORDER — FLUTICASONE PROPIONATE 50 MCG
2 SPRAY, SUSPENSION (ML) NASAL DAILY
Qty: 16 G | Refills: 0 | Status: SHIPPED | OUTPATIENT
Start: 2018-04-02 | End: 2018-06-18 | Stop reason: SDUPTHER

## 2018-04-30 ENCOUNTER — OFFICE VISIT (OUTPATIENT)
Dept: OBGYN CLINIC | Facility: MEDICAL CENTER | Age: 48
End: 2018-04-30
Payer: COMMERCIAL

## 2018-04-30 VITALS — BODY MASS INDEX: 31.31 KG/M2 | DIASTOLIC BLOOD PRESSURE: 80 MMHG | SYSTOLIC BLOOD PRESSURE: 122 MMHG | WEIGHT: 194 LBS

## 2018-04-30 DIAGNOSIS — N93.9 ABNORMAL UTERINE BLEEDING (AUB): Primary | ICD-10-CM

## 2018-04-30 PROCEDURE — 99214 OFFICE O/P EST MOD 30 MIN: CPT | Performed by: OBSTETRICS & GYNECOLOGY

## 2018-04-30 PROCEDURE — 58100 BIOPSY OF UTERUS LINING: CPT | Performed by: OBSTETRICS & GYNECOLOGY

## 2018-04-30 RX ORDER — RIZATRIPTAN BENZOATE 10 MG/1
1 TABLET ORAL
COMMUNITY
Start: 2015-07-21 | End: 2019-02-06 | Stop reason: SDUPTHER

## 2018-04-30 RX ORDER — SUMATRIPTAN AND NAPROXEN SODIUM 85; 500 MG/1; MG/1
TABLET, FILM COATED ORAL
COMMUNITY
End: 2018-11-29

## 2018-04-30 RX ORDER — PROTRIPTYLINE HYDROCHLORIDE 5 MG/1
TABLET, FILM COATED ORAL
Refills: 1 | COMMUNITY
Start: 2018-01-26 | End: 2018-09-19

## 2018-04-30 RX ORDER — TRAMADOL HYDROCHLORIDE 50 MG/1
1 TABLET ORAL 3 TIMES DAILY PRN
COMMUNITY
Start: 2017-11-10 | End: 2018-10-05 | Stop reason: ALTCHOICE

## 2018-04-30 RX ORDER — RANITIDINE 300 MG/1
TABLET ORAL AS NEEDED
COMMUNITY
End: 2019-06-10

## 2018-04-30 RX ORDER — VALACYCLOVIR HYDROCHLORIDE 1 G/1
TABLET, FILM COATED ORAL
COMMUNITY
Start: 2017-11-10 | End: 2018-10-05 | Stop reason: ALTCHOICE

## 2018-04-30 RX ORDER — TOPIRAMATE 100 MG/1
100 TABLET, FILM COATED ORAL
COMMUNITY
Start: 2017-02-28 | End: 2018-10-05 | Stop reason: ALTCHOICE

## 2018-04-30 NOTE — PROGRESS NOTES
Assessment:  56 y o  P3N9526 presenting with menorrhagia and dysmenorrhea  Plan:  Diagnoses and all orders for this visit:    Abnormal uterine bleeding (AUB)  -     CBC and differential  -     Follicle stimulating hormone  -     TSH, 3rd generation  -     US pelvis complete w transvaginal; Future    Other orders  -     ranitidine (ZANTAC) 300 MG tablet; Take by mouth  -     verapamil (CALAN-SR) 120 mg CR tablet; Take 1 tablet by mouth daily  -     valACYclovir (VALTREX) 1,000 mg tablet; Take by mouth  -     traMADol (ULTRAM) 50 mg tablet; Take 1 tablet by mouth 3 (three) times a day as needed  -     SUMAtriptan-naproxen (TREXIMET)  MG per tablet; Treximet 85 mg-500 mg tablet  -     topiramate (TOPAMAX) 100 mg tablet; Take by mouth  -     rizatriptan (MAXALT) 10 MG tablet; Take 1 tablet by mouth  -     protriptyline (VIVACTIL) 5 MG tablet; take 1 tablet by mouth every morning for 10 days then 2 tablets every morning AFTER THAT  -     progesterone (PROMETRIUM) 200 MG capsule; progesterone micronized 200 mg capsule            __________________________________________________________________    Subjective   Tracy Woodson is a 56 y o  [id number] with regular menses who is sexually active and presenting with complaints of heavy and painful menstrual cycles  Patient reports she has been dealing with this problem for years  She reports during this time that her periods have been occurring every 30 days and lasting 6-7 days  She notes her heaviest flow lasts 5 days, during which time she uses a super plus tampons  with pad backup every 1-2 hours  She has dysmenorrhea that occurs throughout menses  She also has menorrhagia (severe)  She denies intermenstrual spotting  She denies a hx of easy bruising, post-partum hemorrhage, or significant bleeding with surgical/dental procedures  She reports passage of clots with her cycle        The following portions of the patient's history were reviewed and updated as appropriate: allergies, current medications, past family history, past medical history, past social history, past surgical history and problem list     Options for management of bleeding reviewed with patient  Has tried NSAIDs with no relief in the past   Not a candidate for OCPs secondary to history of migraines  IUD and progestin only reviewed  Endometrial ablation and hysterectomy discussed as well  Patient interested in endometrial ablation  Reviewed procedure in detail  Discussed work up which includes blood work, EMB and TVUS  Agreed to EMB today  Review of Systems  Pertinent items are noted in HPI  Objective  /80   Wt 88 kg (194 lb)   LMP 04/12/2018   BMI 31 31 kg/m²      Physical Exam:  General:   appears stated age, cooperative, alert normal mood and affect   Neck: Neck: normal, supple,trachea midline, no masses   Heart: regular rate and rhythm, S1, S2 normal, no murmur, click, rub or gallop   Lungs: clear to auscultation bilaterally   Abdomen: soft, non-tender, without masses or organomegaly   Vulva: normal, normal female genitalia, Bartholin's, Urethra, [place] normal, no lesions, normal female hair distribution   Vagina: normal vagina, no discharge, exudate, lesion, or erythema   Urethra: normal   Cervix: Normal, no discharge  Uterus: normal size, contour, position, consistency, mobility, non-tender   Adnexa: normal adnexa         Lab Review  Labs: TSH, FSH and CBC ordered     Imaging  Ultrasound - Pelvic Vaginal    EMB done today  Endometrial Biopsy Procedure Note    Pre-operative Diagnosis: menorrhagia, dysmenorrhea    Post-operative Diagnosis: normal    Indications: abnormal uterine bleeding    Procedure Details   The risks (including infection, bleeding, pain, and uterine perforation) and benefits of the procedure were explained to the patient and Written informed consent was obtained  The patient was placed in the dorsal lithotomy position    Bimanual exam showed the uterus to be in the anteroflexed position  A speculum inserted in the vagina, and the cervix prepped with povidone iodine  A sharp tenaculum was applied to the anterior lip of the cervix for stabilization  The cervix required dilation  A sterile uterine sound was used to sound the uterus to a depth of 8cm  A Pipelle endometrial aspirator was used to sample the endometrium  A(n) moderate amount of tissue was obtained  Sample was sent for pathologic examination  Condition:  Stable    Complications:  None    Plan:    The patient was advised to call for any fever or for prolonged or severe pain or bleeding  She was advised to use NSAID as needed for mild to moderate pain  She was advised to avoid vaginal intercourse for 48 hours or until the bleeding has completely stopped  Attending Physician Documentation:  I was present for or participated in the entire procedure, including opening and closing

## 2018-05-01 LAB
CLINICAL INFO: NORMAL
PATH REPORT.FINAL DX SPEC: NORMAL
PROCEDURE TYPE: NORMAL
SPECIMEN SOURCE: NORMAL

## 2018-05-02 ENCOUNTER — TELEPHONE (OUTPATIENT)
Dept: OBGYN CLINIC | Facility: MEDICAL CENTER | Age: 48
End: 2018-05-02

## 2018-05-02 NOTE — TELEPHONE ENCOUNTER
I contacted pt's insurance in regards to checking benefits for in office eusebia scheduled for 6/4/18 with Dr Sofy Brown  Effective date 1/1/17  Pt has a $350 deductible ($0 met)  Out Of Pocket $7350 ($498 53 met)  No copay for date of service  Pt will be responsible for deductible  She is aware of her costs       Kishor Whitlock Ref# [id number]

## 2018-05-03 DIAGNOSIS — N93.9 ABNORMAL UTERINE BLEEDING (AUB): Primary | ICD-10-CM

## 2018-05-03 RX ORDER — ALPRAZOLAM 0.5 MG/1
0.5 TABLET ORAL
Qty: 2 TABLET | Refills: 0 | Status: SHIPPED | OUTPATIENT
Start: 2018-05-03 | End: 2018-12-18 | Stop reason: ALTCHOICE

## 2018-05-31 ENCOUNTER — HOSPITAL ENCOUNTER (OUTPATIENT)
Dept: ULTRASOUND IMAGING | Facility: HOSPITAL | Age: 48
Discharge: HOME | End: 2018-05-31
Attending: OBSTETRICS & GYNECOLOGY
Payer: COMMERCIAL

## 2018-05-31 DIAGNOSIS — N93.9 ABNORMAL UTERINE BLEEDING (AUB): ICD-10-CM

## 2018-05-31 PROCEDURE — 76830 TRANSVAGINAL US NON-OB: CPT

## 2018-05-31 PROCEDURE — 76856 US EXAM PELVIC COMPLETE: CPT

## 2018-06-01 LAB
BASOPHILS # BLD AUTO: 43 CELLS/UL (ref 0–200)
BASOPHILS NFR BLD AUTO: 0.6 %
EOSINOPHIL # BLD AUTO: 270 CELLS/UL (ref 15–500)
EOSINOPHIL NFR BLD AUTO: 3.8 %
ERYTHROCYTE [DISTWIDTH] IN BLOOD BY AUTOMATED COUNT: 14.2 % (ref 11–15)
FSH SERPL-ACNC: 6.7 MIU/ML
HCT VFR BLD AUTO: 36.5 % (ref 35–45)
HGB BLD-MCNC: 12.1 G/DL (ref 11.7–15.5)
LYMPHOCYTES # BLD AUTO: 2478 CELLS/UL (ref 850–3900)
LYMPHOCYTES NFR BLD AUTO: 34.9 %
MCH RBC QN AUTO: 27.9 PG (ref 27–33)
MCHC RBC AUTO-ENTMCNC: 33.2 G/DL (ref 32–36)
MCV RBC AUTO: 84.3 FL (ref 80–100)
MONOCYTES # BLD AUTO: 625 CELLS/UL (ref 200–950)
MONOCYTES NFR BLD AUTO: 8.8 %
NEUTROPHILS # BLD AUTO: 3685 CELLS/UL (ref 1500–7800)
NEUTROPHILS NFR BLD AUTO: 51.9 %
PLATELET # BLD AUTO: 396 THOUSAND/UL (ref 140–400)
PMV BLD REES-ECKER: 9.6 FL (ref 7.5–12.5)
RBC # BLD AUTO: 4.33 MILLION/UL (ref 3.8–5.1)
TSH SERPL-ACNC: 2.26 MIU/L
WBC # BLD AUTO: 7.1 THOUSAND/UL (ref 3.8–10.8)

## 2018-06-04 ENCOUNTER — PROCEDURE VISIT (OUTPATIENT)
Dept: OBGYN CLINIC | Facility: MEDICAL CENTER | Age: 48
End: 2018-06-04
Payer: COMMERCIAL

## 2018-06-04 VITALS — BODY MASS INDEX: 31.47 KG/M2 | DIASTOLIC BLOOD PRESSURE: 82 MMHG | WEIGHT: 195 LBS | SYSTOLIC BLOOD PRESSURE: 142 MMHG

## 2018-06-04 DIAGNOSIS — Z01.818 PRE-OP TESTING: ICD-10-CM

## 2018-06-04 DIAGNOSIS — N93.9 ABNORMAL UTERINE BLEEDING (AUB): Primary | ICD-10-CM

## 2018-06-04 DIAGNOSIS — R10.2 PELVIC CRAMPING: ICD-10-CM

## 2018-06-04 PROCEDURE — 58353 ENDOMETR ABLATE THERMAL: CPT | Performed by: OBSTETRICS & GYNECOLOGY

## 2018-06-04 RX ORDER — KETOROLAC TROMETHAMINE 30 MG/ML
30 INJECTION, SOLUTION INTRAMUSCULAR; INTRAVENOUS ONCE
Status: COMPLETED | OUTPATIENT
Start: 2018-06-04 | End: 2018-06-04

## 2018-06-04 RX ADMIN — KETOROLAC TROMETHAMINE 30 MG: 30 INJECTION, SOLUTION INTRAMUSCULAR; INTRAVENOUS at 14:16

## 2018-06-04 NOTE — PROGRESS NOTES
Report of Procedure    The patient was correctly identified as Shiar Camp  She was placed in the dorsal lithotomy position  A open ended Graves was inserted into the vagina and the anterior lip of the cervix was visualized and grasped with a single toothed tenaculum   A paracervical block was performed using Lidocaine with Epi     Uterus was sounded to 8 5 cm  Cervical length was determined to be 4 cm  Using Elkin dilators, the cervix was progressively dilated to about 20 mm       Uterine cavity depth was set to 4 5  Novasure device was inserted & deployed into the uterine cavity  Using maneuvers to maximize uterine width, the bipolar electrode was able to expand to a uterine width of 4 7 conforming to the dimensions of the uterine cavity  The uterine width was then set to 4 7  The system insufflated the uterine cavity with CO2 to check for cavity integrity and to ensure proper placement of the device  System was activated and bipolar cauterization with  a Moisture Transport Vacuum System facilitated ablation of the endometrium in approximately 65 seconds under 116 haynes of power  The Novasure system was then removed  Inspection of the bipolar electrode showed burnt endometrial tissue  The tenaculum and retractor were removed  There was no bleeding noted from tenaculum site  The pt tolerated the procedure well   Sponge and instrument count were correct x 2

## 2018-06-18 DIAGNOSIS — J45.909 ASTHMA DUE TO ENVIRONMENTAL ALLERGIES: ICD-10-CM

## 2018-06-18 RX ORDER — FLUTICASONE PROPIONATE 50 MCG
SPRAY, SUSPENSION (ML) NASAL
Qty: 16 G | Refills: 0 | Status: SHIPPED | OUTPATIENT
Start: 2018-06-18 | End: 2018-08-13 | Stop reason: SDUPTHER

## 2018-07-05 ENCOUNTER — OFFICE VISIT (OUTPATIENT)
Dept: OBGYN CLINIC | Facility: MEDICAL CENTER | Age: 48
End: 2018-07-05

## 2018-07-05 VITALS — WEIGHT: 199 LBS | DIASTOLIC BLOOD PRESSURE: 80 MMHG | SYSTOLIC BLOOD PRESSURE: 130 MMHG | BODY MASS INDEX: 32.12 KG/M2

## 2018-07-05 DIAGNOSIS — Z09 POSTOPERATIVE EXAMINATION: Primary | ICD-10-CM

## 2018-07-05 PROCEDURE — 99024 POSTOP FOLLOW-UP VISIT: CPT | Performed by: OBSTETRICS & GYNECOLOGY

## 2018-07-05 NOTE — PROGRESS NOTES
Vito Garcia is a 56 y o  female who presents to the clinic 2 weeks status post endometrial ablation for abnormal uterine bleeding  Eating a regular diet without difficulty  Bowel movements are normal  The patient is not having any pain  Noticed watery, bloody discharge as cycle  Overall happy with procedure  The following portions of the patient's history were reviewed and updated as appropriate: allergies, current medications, past family history, past medical history, past social history, past surgical history and problem list     Review of Systems  Pertinent items are noted in HPI  Objective     /80   Wt 90 3 kg (199 lb)   BMI 32 12 kg/m²   General:  alert and oriented, in no acute distress   Abdomen: soft, bowel sounds active, non-tender         Assessment      Doing well postoperatively  Operative findings again reviewed  Pathology report discussed  Plan     1  Continue any current medications    2  Activity restrictions: none

## 2018-08-13 DIAGNOSIS — J45.909 ASTHMA DUE TO ENVIRONMENTAL ALLERGIES: ICD-10-CM

## 2018-08-14 RX ORDER — FLUTICASONE PROPIONATE 50 MCG
SPRAY, SUSPENSION (ML) NASAL
Qty: 16 G | Refills: 0 | Status: SHIPPED | OUTPATIENT
Start: 2018-08-14 | End: 2019-03-12 | Stop reason: SDUPTHER

## 2018-08-16 DIAGNOSIS — G43.709 CHRONIC MIGRAINE WITHOUT AURA WITHOUT STATUS MIGRAINOSUS, NOT INTRACTABLE: ICD-10-CM

## 2018-08-16 RX ORDER — VENLAFAXINE HYDROCHLORIDE 75 MG/1
CAPSULE, EXTENDED RELEASE ORAL
Qty: 90 CAPSULE | Refills: 1 | Status: SHIPPED | OUTPATIENT
Start: 2018-08-16 | End: 2018-09-19 | Stop reason: SDUPTHER

## 2018-09-17 NOTE — PROGRESS NOTES
Patient ID: Kristian Spears is a 56 y o  female  Assessment/Plan:    Chronic migraine without aura without status migrainosus, not intractable  Preventative:  - Increase Venlafaxine to 150 mg in am  -Verapamil 120 mg at bedtime   -Topamax to 100 mg at bedtime    -Depakote 500 mg at bedtime   -Magnesium 400 mg a day  -B2 (riboflavin) 400 mg a day  -Start Gabapentin 300 mg at bedtime for nights then twice a day for 3 days then 3 times a day  If develop hair loss again, call us to stop medication  -Decadron 2 mg for 5 days-do not use ibuprofen at all (also no tylenol, excedrin, no Over the counter)    Abortive:  - At onset of the migraine, take Rizatriptan 10 mg  If you still have a migraine in 2 hours, repeat Rizatriptan and take prochlorperazine with it  Rizatriptan no more than 3 a week  Prochlorperazine 10 pills a month  If this fails you will take Ketoralac  Ketorolac has a limit of 10 pill a month   Ibuprofen 600-800 mg for tension headaches but less than 3 times a week    Insomnia  Refer to sleep medicine as patient was never seen for possible sleep apnea    Concussion without loss of consciousness  See under migraine management  Will consider adding amantidine if no improvement         Problem List Items Addressed This Visit        Cardiovascular and Mediastinum    Chronic migraine without aura without status migrainosus, not intractable - Primary     Preventative:  - Increase Venlafaxine to 150 mg in am  -Verapamil 120 mg at bedtime   -Topamax to 100 mg at bedtime    -Depakote 500 mg at bedtime   -Magnesium 400 mg a day  -B2 (riboflavin) 400 mg a day  -Start Gabapentin 300 mg at bedtime for nights then twice a day for 3 days then 3 times a day  If develop hair loss again, call us to stop medication  -Decadron 2 mg for 5 days-do not use ibuprofen at all (also no tylenol, excedrin, no Over the counter)    Abortive:  - At onset of the migraine, take Rizatriptan 10 mg    If you still have a migraine in 2 hours, repeat Rizatriptan and take prochlorperazine with it  Rizatriptan no more than 3 a week  Prochlorperazine 10 pills a month  If this fails you will take Ketoralac  Ketorolac has a limit of 10 pill a month   Ibuprofen 600-800 mg for tension headaches but less than 3 times a week         Relevant Medications    divalproex sodium (DEPAKOTE ER) 500 mg 24 hr tablet    ibuprofen (MOTRIN) 600 mg tablet    venlafaxine (EFFEXOR-XR) 75 mg 24 hr capsule    gabapentin (NEURONTIN) 300 mg capsule    dexamethasone (DECADRON) 2 mg tablet    Other Relevant Orders    MRI brain with and without contrast    BUN    Creatinine, serum       Nervous and Auditory    Concussion without loss of consciousness     See under migraine management  Will consider adding amantidine if no improvement            Other    Insomnia     Refer to sleep medicine as patient was never seen for possible sleep apnea         Relevant Orders    Ambulatory referral to Sleep Medicine             Subjective:    HPI  We had the pleasure of evaluating Kobe Friedman in neurological follow up today  As you know she is a 56year old right handed female  She is a welfare   In June was leaning over to help one of her dogs stand up and another dog hit her in the head  Did not lose consciousness but did see starts/black  She became immediately tired and slept  Since that time she states her headaches have worsened  Chronic Migraine/ Tension Headaches:      How often do the headaches occur - TTH- 2-4 times a week; migraines- 3-5 a week  What time of the day do the headaches start  mostly in the am but wakes up in the middle of the night with migraines   How long do the headaches last - TTH- all day; migraines 2hr to 2 days  Where are they located  start left side of head, frontal region, occipital, neck  What is the intensity of pain TTH- average pain level 5/10; migraines- average pain level 8-10/10  Describe your usual headache  migraines- Pressure, sharp; TTH- dull  Aura: - none  Associated symptoms: nausea, Decrease appetite, photophobia, phonophobia, sensitive to smells, Problem with concentration, left pupil size change, light-headed or dizzy, stiff or sore neck, prefer to be alone and in a dark room, unable to work, irritable, easily frustrated   Headache trigger: Fatigue, Stress/Tension, missing meals, chewing, lack of sleep, menstruation, weather  What medications do you take or have you taken for your headaches? PREVENTIVE: Vitamin B2 400mg, amlodipine, Pamalor, mag oxide, venlafaxine, gabapentin- side effects, cyproheptadine, verapamil, zonisamide, topamax, protriptyline  ABORTIVE: Treximet, maxalt- helps, naproxen, dexamethasone, ibuprofen, toradol- did help break help  Non-Medical/Alternative Treatments used in the past for headaches: massage     Having difficulty with her memory  She states she repeats conversations with clients which she doesn't remember  Sleep: patient states not sleeping well  Very tired and fatgued  she is getting up very tired and has noted she is very fatigued  She is grinding her teeth  She also tells me that she was not able to go to sleep center in the past as her insurance did not cover it  Brain MRI wo contrast 2/26/15  - Several tiny bifrontal subcortical T2 FLAIR and hyperintense foci suspicious for mild chronic micro-vascular changes which may be associated with migraine headaches  - Mild multi-focal paranasal sinus disease w/o air-fluid levels          The following portions of the patient's history were reviewed and updated as appropriate: allergies, current medications, past family history, past medical history, past social history, past surgical history and problem list          Objective:    Blood pressure 110/70, pulse 72, height 5' 6" (1 676 m), weight 93 5 kg (206 lb 3 2 oz)  Physical Exam   Constitutional: She appears well-developed and well-nourished     HENT:   Head: Normocephalic and atraumatic  Eyes: EOM are normal  Pupils are equal, round, and reactive to light  Neck: Normal range of motion  Cardiovascular: Normal rate  Pulmonary/Chest: Effort normal    Musculoskeletal: Normal range of motion  Neurological: She has normal strength and normal reflexes  Gait and coordination normal    Skin: Skin is warm and dry  Psychiatric: She has a normal mood and affect  Her speech is normal    Nursing note and vitals reviewed  Neurological Exam    Mental Status  The patient is alert and oriented to person, place, time, and situation  Her recent and remote memory are normal  She has no visuospatial neglect  Her speech is normal  Her language is fluent with no aphasia  She has normal attention span and concentration  She has a normal fund of knowledge  Cranial Nerves    CN II: The patient's visual acuity and visual fields are normal   CN III, IV, VI: The patient's pupils are equally round and reactive to light and ocular movements are normal   CN V: The patient has normal facial sensation  CN VII:  The patient has symmetric facial movement  CN VIII:  The patient's hearing is normal   CN IX, X: The patient has symmetric palate movement and normal gag reflex  CN XI: The patient's shoulder shrug strength is normal   CN XII: The patient's tongue is midline without atrophy or fasciculations  Motor  The patient has normal muscle bulk throughout  Her overall muscle tone is normal throughout  Her strength is 5/5 throughout all four extremities  Sensory  The patient's sensation is normal in all four extremities  She has normal cortical sensation    Reflexes  Deep tendon reflexes are 2+ and symmetric in all four extremities with downgoing toes bilaterally  Gait and Coordination  The patient has normal gait and station and normal casual, toe, heel, and tandem gait  She has normal coordination bilaterally  ROS:    Review of Systems   Constitutional: Positive for fatigue     HENT: Negative  Eyes: Positive for photophobia (sometimes ) and visual disturbance (sometimes )  Respiratory: Negative  Cardiovascular: Negative  Gastrointestinal: Positive for nausea (sometimes )  Endocrine: Negative  Genitourinary: Negative  Musculoskeletal: Negative  Skin: Negative  Allergic/Immunologic: Negative  Neurological: Positive for headaches  Hematological: Negative  Psychiatric/Behavioral: Positive for sleep disturbance  The patient is nervous/anxious

## 2018-09-19 ENCOUNTER — OFFICE VISIT (OUTPATIENT)
Dept: NEUROLOGY | Facility: CLINIC | Age: 48
End: 2018-09-19
Payer: COMMERCIAL

## 2018-09-19 VITALS
HEIGHT: 66 IN | BODY MASS INDEX: 33.14 KG/M2 | HEART RATE: 72 BPM | DIASTOLIC BLOOD PRESSURE: 70 MMHG | SYSTOLIC BLOOD PRESSURE: 110 MMHG | WEIGHT: 206.2 LBS

## 2018-09-19 DIAGNOSIS — S06.0X0A CONCUSSION WITHOUT LOSS OF CONSCIOUSNESS, INITIAL ENCOUNTER: ICD-10-CM

## 2018-09-19 DIAGNOSIS — G43.709 CHRONIC MIGRAINE WITHOUT AURA WITHOUT STATUS MIGRAINOSUS, NOT INTRACTABLE: Primary | ICD-10-CM

## 2018-09-19 DIAGNOSIS — G47.00 INSOMNIA, UNSPECIFIED TYPE: ICD-10-CM

## 2018-09-19 PROCEDURE — 99214 OFFICE O/P EST MOD 30 MIN: CPT | Performed by: PHYSICIAN ASSISTANT

## 2018-09-19 RX ORDER — FEXOFENADINE HCL 180 MG/1
180 TABLET ORAL DAILY
COMMUNITY
End: 2019-09-24

## 2018-09-19 RX ORDER — MAGNESIUM 200 MG
400 TABLET ORAL AS NEEDED
COMMUNITY
End: 2018-12-18 | Stop reason: ALTCHOICE

## 2018-09-19 RX ORDER — DEXAMETHASONE 2 MG/1
2 TABLET ORAL
Qty: 5 TABLET | Refills: 0 | Status: SHIPPED | OUTPATIENT
Start: 2018-09-19 | End: 2018-10-05 | Stop reason: ALTCHOICE

## 2018-09-19 RX ORDER — DIVALPROEX SODIUM 500 MG/1
500 TABLET, EXTENDED RELEASE ORAL
COMMUNITY
Start: 2018-09-10 | End: 2018-10-05 | Stop reason: ALTCHOICE

## 2018-09-19 RX ORDER — VENLAFAXINE HYDROCHLORIDE 75 MG/1
150 CAPSULE, EXTENDED RELEASE ORAL DAILY
Qty: 90 CAPSULE | Refills: 0 | Status: SHIPPED | OUTPATIENT
Start: 2018-09-19 | End: 2018-10-05 | Stop reason: ALTCHOICE

## 2018-09-19 RX ORDER — ALBUTEROL SULFATE 90 UG/1
AEROSOL, METERED RESPIRATORY (INHALATION)
COMMUNITY
End: 2019-09-24 | Stop reason: SDUPTHER

## 2018-09-19 RX ORDER — GABAPENTIN 300 MG/1
300 CAPSULE ORAL 3 TIMES DAILY
Qty: 90 CAPSULE | Refills: 2 | Status: SHIPPED | OUTPATIENT
Start: 2018-09-19 | End: 2018-10-05 | Stop reason: SINTOL

## 2018-09-19 RX ORDER — ATORVASTATIN CALCIUM 10 MG/1
1 TABLET, FILM COATED ORAL DAILY
COMMUNITY
Start: 2017-09-21 | End: 2019-02-05

## 2018-09-19 RX ORDER — IBUPROFEN 600 MG/1
600 TABLET ORAL ONCE AS NEEDED
COMMUNITY
End: 2018-10-05 | Stop reason: ALTCHOICE

## 2018-09-19 NOTE — ASSESSMENT & PLAN NOTE
Preventative:  - Increase Venlafaxine to 150 mg in am  -Verapamil 120 mg at bedtime   -Topamax to 100 mg at bedtime    -Depakote 500 mg at bedtime   -Magnesium 400 mg a day  -B2 (riboflavin) 400 mg a day  -Start Gabapentin 300 mg at bedtime for nights then twice a day for 3 days then 3 times a day  If develop hair loss again, call us to stop medication  -Decadron 2 mg for 5 days-do not use ibuprofen at all (also no tylenol, excedrin, no Over the counter)    Abortive:  - At onset of the migraine, take Rizatriptan 10 mg  If you still have a migraine in 2 hours, repeat Rizatriptan and take prochlorperazine with it  Rizatriptan no more than 3 a week  Prochlorperazine 10 pills a month  If this fails you will take Ketoralac    Ketorolac has a limit of 10 pill a month   Ibuprofen 600-800 mg for tension headaches but less than 3 times a week

## 2018-09-19 NOTE — PATIENT INSTRUCTIONS
Preventative:  - Increase Venlafaxine to 150 mg in am  -Verapamil 120 mg at bedtime   -Topamax to 100 mg at bedtime    -Depakote 500 mg at bedtime   -Magnesium 400 mg a day  -B2 (riboflavin) 400 mg a day  -Start Gabapentin 300 mg at bedtime for nights then twice a day for 3 days then 3 times a day  If develop hair loss again, call us to stop medication  -Decadron 2 mg for 5 days-do not use ibuprofen at all (also no tylenol, excedrin, no Over the counter)    Abortive:  - At onset of the migraine, take Rizatriptan 10 mg  If you still have a migraine in 2 hours, repeat Rizatriptan and take prochlorperazine with it  Rizatriptan no more than 3 a week  Prochlorperazine 10 pills a month  If this fails you will take Ketoralac  Ketorolac has a limit of 10 pill a month   Ibuprofen 600-800 mg for tension headaches but less than 3 times a week    Referral to sleep medicine    - Young patient who are still in a reproductive age, should take folic acid daily when taking anti-seiuzre drugs especially Depakote  May take these over-the-counter supplements to decrease intensity and frequency of migraines  - Magnesium Oxide 400-500 mg a day  If any diarrhea or upset stomach, decrease dose  as tolerated  -  B2 200 mg a day  May take once a day in am  This supplement will change the color of the urine to fluorescent yellow no matter how hydrated, which is normal       Headache management instructions  - When patient has a moderate to severe headache, they should seek rest, initiate relaxation and apply cold compresses to the head  - Maintain regular sleep schedule  Adults need at least 7-8 hours of uninterrupted a night  - Limit over the counter medications such as Tylenol, Ibuprofen, Aleve, Excedrin  (No more than 3 times a week)  - Maintain headache diary  We discussed an AMANDA for a smart phone is "Migraine e Diary"  - Limit caffeine to 1-2 cups a day or less    - Avoid dietary trigger  (aged cheese, peanuts, MSG, aspartame and nitrates)  - Patient is to have regular frequent meals to prevent headache onset  - Please drink at least 64 ounces of water a day to help remain hydrated  Please call with any questions or concerns

## 2018-10-01 ENCOUNTER — TELEPHONE (OUTPATIENT)
Dept: NEUROLOGY | Facility: CLINIC | Age: 48
End: 2018-10-01

## 2018-10-01 DIAGNOSIS — G43.709 CHRONIC MIGRAINE WITHOUT AURA WITHOUT STATUS MIGRAINOSUS, NOT INTRACTABLE: Primary | ICD-10-CM

## 2018-10-01 RX ORDER — TOPIRAMATE 25 MG/1
TABLET ORAL
Qty: 30 TABLET | Refills: 0 | Status: SHIPPED | OUTPATIENT
Start: 2018-10-01 | End: 2018-10-05 | Stop reason: ALTCHOICE

## 2018-10-01 NOTE — TELEPHONE ENCOUNTER
Patient states her lymph nodes in neck and armpits were very swollen and painful to touch and she thinks she had an allergic reaction to the gabapentin  She was also experiencing SOB and chest tightness  Denies any rashes  Denies any other sxs  Denies fever, denies recent illness  She states she stopped taking gabapentin on Friday and after stopping it for 2 days, her sxs have improved and her lymph nodes are not swollen any more  She wants to go back to just taking abortive meds, "just don't feel clear headed" "not myself"  She wants to d/c venlafaxine and depakote, hasn't taken the topamax in a couple days (out of medication)  Please advise  Okay to leave a detailed message

## 2018-10-01 NOTE — TELEPHONE ENCOUNTER
She was on 100 mg of Topamax  She likely needs to wean off of it  Send in some 25 milligram tablets  She should take 75 milligrams for 3 days, 50 milligrams for 3 days, 25 milligrams for 1 day  After she completes that we can wean medications if she wishes  However, her migraines will worsen  Next would be to wean Depakote  She should take half a tablet for 5 days and then stop    She should then remain on the venlafaxine for at least 30 more days before attempting to discontinue

## 2018-10-05 ENCOUNTER — OFFICE VISIT (OUTPATIENT)
Dept: INTERNAL MEDICINE CLINIC | Facility: CLINIC | Age: 48
End: 2018-10-05
Payer: COMMERCIAL

## 2018-10-05 VITALS
HEART RATE: 88 BPM | OXYGEN SATURATION: 98 % | DIASTOLIC BLOOD PRESSURE: 82 MMHG | HEIGHT: 66 IN | SYSTOLIC BLOOD PRESSURE: 138 MMHG | WEIGHT: 208.6 LBS | TEMPERATURE: 98.2 F | BODY MASS INDEX: 33.52 KG/M2

## 2018-10-05 DIAGNOSIS — R79.89 LOW VITAMIN D LEVEL: ICD-10-CM

## 2018-10-05 DIAGNOSIS — G43.709 CHRONIC MIGRAINE WITHOUT AURA WITHOUT STATUS MIGRAINOSUS, NOT INTRACTABLE: Primary | ICD-10-CM

## 2018-10-05 DIAGNOSIS — G47.00 INSOMNIA, UNSPECIFIED TYPE: ICD-10-CM

## 2018-10-05 DIAGNOSIS — R53.83 FATIGUE, UNSPECIFIED TYPE: ICD-10-CM

## 2018-10-05 DIAGNOSIS — I10 ESSENTIAL HYPERTENSION: ICD-10-CM

## 2018-10-05 DIAGNOSIS — Z23 NEED FOR INFLUENZA VACCINATION: ICD-10-CM

## 2018-10-05 DIAGNOSIS — Z12.31 VISIT FOR SCREENING MAMMOGRAM: ICD-10-CM

## 2018-10-05 DIAGNOSIS — E78.2 MIXED HYPERLIPIDEMIA: ICD-10-CM

## 2018-10-05 DIAGNOSIS — R94.6 NONSPECIFIC ABNORMAL RESULTS OF FUNCTION STUDY OF THYROID: ICD-10-CM

## 2018-10-05 DIAGNOSIS — F41.9 ANXIETY: ICD-10-CM

## 2018-10-05 PROBLEM — M22.40 CHONDROMALACIA PATELLAE: Status: ACTIVE | Noted: 2018-10-05

## 2018-10-05 PROBLEM — N92.1 MENORRHAGIA WITH IRREGULAR CYCLE: Status: ACTIVE | Noted: 2017-09-19

## 2018-10-05 PROBLEM — Z13.1 SCREENING FOR DIABETES MELLITUS: Status: ACTIVE | Noted: 2018-10-05

## 2018-10-05 PROBLEM — B02.9 SHINGLES: Status: RESOLVED | Noted: 2017-11-10 | Resolved: 2018-10-05

## 2018-10-05 PROBLEM — B02.9 SHINGLES: Status: ACTIVE | Noted: 2017-11-10

## 2018-10-05 PROCEDURE — 90686 IIV4 VACC NO PRSV 0.5 ML IM: CPT

## 2018-10-05 PROCEDURE — 90471 IMMUNIZATION ADMIN: CPT

## 2018-10-05 PROCEDURE — 99214 OFFICE O/P EST MOD 30 MIN: CPT | Performed by: FAMILY MEDICINE
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
## 2018-10-05 NOTE — PATIENT INSTRUCTIONS
Migraine Headache   AMBULATORY CARE:   A migraine headache  is a severe headache  The pain can be so severe that it interferes with your daily activities  A migraine can last a few hours up to several days  The exact cause of migraines is not known  Common triggers for a migraine include the following:   · Stress, eye strain, oversleeping, or not getting enough sleep    · Hormone changes in women from birth control pills, pregnancy, menopause, or during a monthly period    · Skipping meals, going too long without eating, or not drinking enough liquids    · Certain foods or drinks such as chocolate, hard cheese, red wine, or drinks that contain caffeine    · Foods that contain gluten, nitrates, MSG, or artificial sweeteners    · Sunlight, bright or flashing lights, loud noises, smoke, or strong smells    · Heat, humidity, or changes in the weather  Common warning signs include the following:  Warning signs usually start 15 to 60 minutes before the headache:  · Visual changes (auras), such as blurred vision, temporary blind or bright spots, lines, or hallucinations    · Unusual tiredness or frequent yawning    · Tingling in an arm or leg  Seek care immediately if:   · You have a headache that seems different or much worse than your usual migraine headache  · You have a severe headache with a fever or a stiff neck  · You have new problems with speech, vision, balance, or movement  · You feel like you are going to faint, you become confused, or you have a seizure  Contact your healthcare provider or neurologist if:   · You have a fever  · Your migraines interfere with your daily activities  · Your medicines or treatments stop working  · You have questions about your condition or care  Treatment:  Migraines cannot be cured  The goal of treatment is to reduce your symptoms  Take medicine as soon as you feel a migraine begin  · Prescription pain medicine  may be given   Do not wait until the pain is severe before you take your medicine  · Migraine medicines  are used to help prevent a migraine or stop it once it starts  · Antinausea medicine  may be given to calm your stomach and to help prevent vomiting  This medicine can also help relieve pain  Manage your symptoms:   · Rest in a dark, quiet room  This will help decrease your pain  Sleep may also help relieve the pain  · Apply ice to decrease pain  Use an ice pack, or put crushed ice in a plastic bag  Cover the ice pack with a towel and place it on your head where it hurts for 15 to 20 minutes every hour  · Apply heat to decrease pain and muscle spasms  Use a small towel dampened with warm water or a heating pad, or sit in a warm bath  Apply heat on the area for 20 to 30 minutes every 2 hours  You may alternate heat and ice  · Keep a migraine record  Write down when your migraines start and stop  Include your symptoms and what you were doing when a migraine began  Record what you ate or drank for 24 hours before the migraine started  Keep track of what you did to treat your migraine and if it worked  Follow up with your healthcare provider as directed:  Bring your migraine record with you  Write down your questions so you remember to ask them during your visits  Prevent another migraine headache:   · Do not smoke  Nicotine and other chemicals in cigarettes and cigars can trigger a migraine and also cause lung damage  Ask your healthcare provider for information if you currently smoke and need help to quit  E-cigarettes or smokeless tobacco still contain nicotine  Talk to your healthcare provider before you use these products  · Do not drink alcohol  Alcohol can trigger a migraine  It can also interfere with the medicines used to treat your migraine  · Exercise regularly  Ask about the best exercise plan for you  · Manage stress  Stress may trigger a migraine  Learn new ways to relax, such as deep breathing      · Follow a sleep schedule  Go to bed and get up at the same time each day  · Eat a variety of healthy foods  Healthy foods include fruit, vegetables, whole-grain breads, low-fat dairy products, beans, lean meats, and fish  Do not have foods or drinks that trigger your migraines  © 2017 [employer] Information is for End User's use only and may not be sold, redistributed or otherwise used for commercial purposes  All illustrations and images included in CareNotes® are the copyrighted property of [employer]  or Luis Bradley  The above information is an  only  It is not intended as medical advice for individual conditions or treatments  Talk to your doctor, nurse or pharmacist before following any medical regimen to see if it is safe and effective for you

## 2018-10-13 LAB
ALP SERPL-CCNC: 71 U/L (ref 46–116)
ALP SERPL-CCNC: 71 U/L (ref 46–116)
ALT SERPL W P-5'-P-CCNC: 10 U/L (ref 12–78)
ALT SERPL W P-5'-P-CCNC: 10 U/L (ref 12–78)
AST SERPL W P-5'-P-CCNC: 11 U/L (ref 5–45)
AST SERPL W P-5'-P-CCNC: 11 U/L (ref 5–45)
BILIRUB SERPL-MCNC: 0.3 MG/DL
BILIRUB SERPL-MCNC: 0.3 MG/DL
BUN SERPL-MCNC: 9 MG/DL (ref 5–25)
BUN SERPL-MCNC: 9 MG/DL (ref 5–25)
BUN SERPL-MCNC: 9 MG/DL (ref 7–25)
CHOLEST SERPL-MCNC: 191 MG/DL (ref 50–200)
CHOLEST SERPL-MCNC: 191 MG/DL (ref 50–200)
CREAT SERPL-MCNC: 0.84 MG/DL (ref 0.6–1.3)
CREAT SERPL-MCNC: 0.84 MG/DL (ref 0.6–1.3)
CREAT SERPL-MCNC: 0.86 MG/DL (ref 0.5–1.1)
GLUCOSE SERPL-MCNC: 96 MG/DL
GLUCOSE SERPL-MCNC: 96 MG/DL
HCT VFR BLD AUTO: 36.3 % (ref 34.8–46.1)
HCT VFR BLD AUTO: 36.3 % (ref 34.8–46.1)
HDLC SERPL-MCNC: 41 MG/DL (ref 40–60)
HDLC SERPL-MCNC: 41 MG/DL (ref 40–60)
HGB BLD-MCNC: 12.2 G/DL (ref 11.5–15.4)
HGB BLD-MCNC: 12.2 G/DL (ref 11.5–15.4)
LDLC SERPL DIRECT ASSAY-MCNC: 123 MG/DL
LDLC SERPL DIRECT ASSAY-MCNC: 123 MG/DL
NEUTROPHILS # BLD AUTO: 2790 THOUSANDS/ΜL (ref 1.85–7.62)
NEUTROPHILS # BLD AUTO: 2790 THOUSANDS/ΜL (ref 1.85–7.62)
PLATELET # BLD AUTO: 372 THOUSANDS/UL (ref 149–390)
PLATELET # BLD AUTO: 372 THOUSANDS/UL (ref 149–390)
POTASSIUM SERPL-SCNC: 3.9 MMOL/L (ref 3.5–5.3)
POTASSIUM SERPL-SCNC: 3.9 MMOL/L (ref 3.5–5.3)
SL AMB EGFR AFRICAN AMERICAN: 94 ML/MIN/1.73M2
SL AMB EGFR NON AFRICAN AMERICAN: 81 ML/MIN/1.73M2
SODIUM SERPL-SCNC: 139 MMOL/L (ref 136–145)
SODIUM SERPL-SCNC: 139 MMOL/L (ref 136–145)
TRIGL SERPL-MCNC: 156 MG/DL (ref ?–150)
TRIGL SERPL-MCNC: 156 MG/DL (ref ?–150)
TSH SERPL DL<=0.05 MIU/L-ACNC: 3.15 UIU/ML (ref 0.34–4.82)
TSH SERPL DL<=0.05 MIU/L-ACNC: 3.15 UIU/ML (ref 0.34–4.82)
WBC # BLD AUTO: 5.8 10*3/ML (ref 3.3–10)
WBC # BLD AUTO: 5.8 10*3/ML (ref 3.3–10)

## 2018-10-17 ENCOUNTER — HOSPITAL ENCOUNTER (OUTPATIENT)
Dept: MRI IMAGING | Facility: HOSPITAL | Age: 48
Discharge: HOME | End: 2018-10-17
Payer: COMMERCIAL

## 2018-10-17 DIAGNOSIS — G43.709 CHRONIC MIGRAINE WITHOUT AURA WITHOUT STATUS MIGRAINOSUS, NOT INTRACTABLE: ICD-10-CM

## 2018-10-17 PROCEDURE — 70553 MRI BRAIN STEM W/O & W/DYE: CPT

## 2018-10-17 PROCEDURE — A9585 GADOBUTROL INJECTION: HCPCS | Performed by: PHYSICIAN ASSISTANT

## 2018-10-17 RX ADMIN — GADOBUTROL 9 ML: 604.72 INJECTION INTRAVENOUS at 09:44

## 2018-10-22 ENCOUNTER — DOCUMENTATION (OUTPATIENT)
Dept: INTERNAL MEDICINE CLINIC | Facility: CLINIC | Age: 48
End: 2018-10-22

## 2018-11-02 ENCOUNTER — TELEPHONE (OUTPATIENT)
Dept: INTERNAL MEDICINE CLINIC | Facility: CLINIC | Age: 48
End: 2018-11-02

## 2018-11-02 NOTE — TELEPHONE ENCOUNTER
Received a call from Jennifer stating that her work stated that question 10 on her FMLA form was questioned by the employer  Spoke with Dr Bonifacio Ackerman and she stated there wasn't a full job description list so she filled it out as she could  Called and spoke with Jennifer and reviewed  Let her know how it is filled out, and also that if there was a job discription list for Dr Bonifacio Ackerman to review that she may have more information to place at question 10  Pt stated that she would have them send us a list, and provided her with the fax number

## 2018-11-28 RX ORDER — DIVALPROEX SODIUM 500 MG/1
TABLET, EXTENDED RELEASE ORAL
COMMUNITY
Start: 2018-10-15 | End: 2018-12-18 | Stop reason: ALTCHOICE

## 2018-11-28 RX ORDER — GABAPENTIN 300 MG/1
CAPSULE ORAL
COMMUNITY
Start: 2018-09-19 | End: 2018-11-28 | Stop reason: SDUPTHER

## 2018-11-28 RX ORDER — GABAPENTIN 300 MG/1
CAPSULE ORAL
Refills: 0 | COMMUNITY
Start: 2018-09-19 | End: 2018-12-18 | Stop reason: ALTCHOICE

## 2018-11-29 ENCOUNTER — OFFICE VISIT (OUTPATIENT)
Dept: SLEEP CENTER | Facility: CLINIC | Age: 48
End: 2018-11-29
Payer: COMMERCIAL

## 2018-11-29 VITALS
BODY MASS INDEX: 33.97 KG/M2 | SYSTOLIC BLOOD PRESSURE: 138 MMHG | WEIGHT: 211.4 LBS | DIASTOLIC BLOOD PRESSURE: 84 MMHG | OXYGEN SATURATION: 96 % | HEIGHT: 66 IN

## 2018-11-29 DIAGNOSIS — G47.00 INSOMNIA, UNSPECIFIED TYPE: ICD-10-CM

## 2018-11-29 DIAGNOSIS — J31.0 CHRONIC RHINITIS: ICD-10-CM

## 2018-11-29 DIAGNOSIS — E66.9 OBESITY (BMI 30-39.9): ICD-10-CM

## 2018-11-29 DIAGNOSIS — G47.10 HYPERSOMNIA: ICD-10-CM

## 2018-11-29 DIAGNOSIS — G43.709 CHRONIC MIGRAINE WITHOUT AURA WITHOUT STATUS MIGRAINOSUS, NOT INTRACTABLE: ICD-10-CM

## 2018-11-29 DIAGNOSIS — I10 ESSENTIAL HYPERTENSION: ICD-10-CM

## 2018-11-29 DIAGNOSIS — J45.20 MILD INTERMITTENT ASTHMA WITHOUT COMPLICATION: ICD-10-CM

## 2018-11-29 DIAGNOSIS — G47.33 OBSTRUCTIVE SLEEP APNEA: Primary | ICD-10-CM

## 2018-11-29 DIAGNOSIS — F41.9 ANXIETY: ICD-10-CM

## 2018-11-29 PROCEDURE — 99204 OFFICE O/P NEW MOD 45 MIN: CPT | Performed by: INTERNAL MEDICINE

## 2018-11-29 RX ORDER — OLOPATADINE HYDROCHLORIDE 665 UG/1
SPRAY NASAL
Refills: 0 | COMMUNITY
Start: 2018-11-12 | End: 2019-09-24 | Stop reason: SDUPTHER

## 2018-11-29 RX ORDER — MONTELUKAST SODIUM 10 MG/1
10 TABLET ORAL
Refills: 0 | COMMUNITY
Start: 2018-11-12 | End: 2019-11-05 | Stop reason: SDUPTHER

## 2018-11-29 RX ORDER — ZOLPIDEM TARTRATE 5 MG/1
5 TABLET ORAL
Qty: 1 TABLET | Refills: 0 | Status: SHIPPED | OUTPATIENT
Start: 2018-11-29 | End: 2018-12-18 | Stop reason: ALTCHOICE

## 2018-11-29 NOTE — PROGRESS NOTES
Consultation - Vikas Sutton  56 y o  female  :1970  LWJ:[id number]    Physician Requesting Consult: Gibson Reynoso PA-C     Reason for Consult : [At your kind request] I saw this patient for initial sleep evaluation today  She is here because of her chronic migraine, constant fatigue and sleep difficulties  PFSH, Problem List, Medications & Allergies were reviewed in EMR  She  has a past medical history of Anxiety; Endometriosis; Esophageal reflux; High cholesterol; and Plantar fasciitis  She has a current medication list which includes the following prescription(s): albuterol, atorvastatin, fexofenadine, fluticasone, magnesium, montelukast, olopatadine hcl, ranitidine, rizatriptan, verapamil, alprazolam, divalproex sodium, gabapentin, and zolpidem  HPI:  She is experiencing headaches 12-15 times a month  Recently she was weaned off her prophylactic medications because of adverse effects  She has difficulty both initiating and maintaining sleep  She reports snoring of several years duration that is loud enough to disturb others  At times she awakens herself with choking or gasping  Symptoms are worse when she is supine  Restless Leg Syndrome: reports no suggestive symptoms    Parasomnia activity: reports teeth grinding during sleep, but no other features of parasomnia  Other Complaints:  Excessive daytime sleepiness  Sleep Routine: Typical Bedtime:  11:00 p m  Gets OOB:  6:30 a m  [TIB:7 5 hrs] [[email or web address] hrs]  Sleep latency: several hours and attributes to racing thoughts because of work related stress Sleep Interruptions: 1-2 x/night [and at times may struggle to fall back asleep]  Awakens: with aid of multiple alarms  feeling never rested  She has Excessive Daytime Sleepiness and frequently has to nap during her lunch breaks and struggles to stay awake in meetings  Henderson Sleepiness Scale rated at Total score: 13      Habits: reports that she has never smoked  She has never used smokeless tobacco , reports that she drinks alcohol , [ reports that she does not use drugs  ],Caffeine use: excessive until dinnertime, Exercise routine: none[ ]  Family History: [Negative for sleep disturbance ]  ROS: reviewed & as attached  [Significant for weight gain of 20-30 lb in the past 1 year  She has allergies that are worse in the current months  She feels her asthma is controlled but reports frequent cough  She has difficulty with memory and concentration  She feels down and frustrated and rated herself at 11 on the PHQ-9 scale  EXAM:    Vitals /84   Ht 5' 6" (1 676 m)   Wt 95 9 kg (211 lb 6 4 oz)   SpO2 96%   BMI 34 12 kg/m²     General  [Well] groomed female, appears stated age, in [no apparent] distress  Psychiatric  Alert and cooperative  [Mental state appears anxious and has a constricted affect ] Judgement & Insight  [good]   Head   Craniofacial anatomy:normal Sinuses: [non-] tender  TMJ: [Normal]     Eyes   EOM's intact, conjunctiva/corneas clear         Nasal Airway  is patent Septum:[central], Mucous membranes:appear [normal]  Turbinates:  are [normal ] There is [no] rhinorrhea; [No] PND     Oral   Airway   crowded Tongue:Modified Mallampati class IV (only hard palate visible)  Palate:  redundant soft palateTonsils: [no] hypertrophy  Teeth: normal       Neck    appears thick and there's extra fatty tissue; Neck Circumference: 39cm; Supple; [no] abnormal masses; Thyroid:[normal]  Trachea:[central]      Lymph    [No] Cervical [or] Submandibular Lymhadenopathy   Heart:    RRR; S1,S2 normal; [no] gallop; [no]murmur[s]     Lungs   Respiratory Effort:[normal]  Air entry [good] [bilaterally]  [No] wheezes  [No] rales   Abdomen   Obese, Soft & non-tender     Extremities   [No] pedal edema  [No] clubbing or cyanosis  Skin   Skin is warm and dry; Color& Hydration [good];  [no] facial rashes or lesions    Neurologic  Speech is clear and coherent  CNII-XII intact  Rombergs [Negative]  Muscskeltl    Muscle bulk, tone and power [WNL] Gait:[normal]          IMPRESSION: Primary Sleep/Secondary(to Medical or Psych conditions) & comorbidities   1  Obstructive sleep apnea  Diagnostic Sleep Study    CPAP Study   2  Insomnia, unspecified type  Ambulatory referral to Sleep Medicine    zolpidem (AMBIEN) 5 mg tablet   3  Chronic migraine without aura without status migrainosus, not intractable     4  Hypersomnia     5  Anxiety     6  Essential hypertension     7  Chronic rhinitis     8  Mild intermittent asthma without complication     9  Obesity (BMI 30-39  9)          PLAN:   1  Comprehensive counseling was provided on pathophysiology, diagnostic strategies & treatment options; effects on symptoms and comorbidities; risks of inadequate therapy; costs and insurance aspects  2  I advised on weight reduction, avoiding sleeping supine, using alcohol or sedating medications close to bed time and on safe driving practices  3  Cognitive behavioral therapy was initiated with advise on Sleep Hygiene and behavioral techniques to manage Insomnia  Specifically, limiting time in bed to 6-1/2 hours, starting an exercise routine, avoiding caffeine use after 3:00 p m  And on relaxation techniques  4  Nocturnal polysomnography is indicated and a diagnostic study will be scheduled  5  Patient is willing to try Positive airway pressure therapy and will be scheduled for a titration study if indicated  6  Follow-up will be scheduled after the studies to review results, further details of treatment options and to initiate/adjust therapy  Thank you for allowing me to participate in the care of this patient  I will keep you apprised of developments      Sincerely,     Authenticated electronically by Benito Salazar MD   on 26/81/08   Board Certified Specialist

## 2018-11-29 NOTE — PROGRESS NOTES
Review of Systems      Genitourinary none   Cardiology ankle/leg swelling   Gastrointestinal frequent heartburn/acid reflux   Neurology frequent headaches, awaken with headache, need to move extremities, numbness/tingling of an extremity, forgetfulness and poor concentration or confusion,    Constitutional fatigue and weight change   Integumentary rash or dry skin and itching   Psychiatry aggressiveness or irritability   Musculoskeletal joint pain, muscle aches, back pain and sciatica   Pulmonary shortness of breath with activity, frequent cough and snoring   ENT throat clearing and ringing in ears   Endocrine excessive thirst and frequent urination   Hematological none

## 2018-12-06 ENCOUNTER — TRANSCRIBE ORDERS (OUTPATIENT)
Dept: SLEEP CENTER | Facility: HOSPITAL | Age: 48
End: 2018-12-06

## 2018-12-06 DIAGNOSIS — G47.33 OBSTRUCTIVE SLEEP APNEA: Primary | ICD-10-CM

## 2018-12-12 ENCOUNTER — DOCUMENTATION (OUTPATIENT)
Dept: INTERNAL MEDICINE CLINIC | Facility: CLINIC | Age: 48
End: 2018-12-12

## 2018-12-17 NOTE — PROGRESS NOTES
Patient ID: Kelli Tristan is a 56 y o  female  Assessment/Plan:   Problem List Items Addressed This Visit        Cardiovascular and Mediastinum    Chronic migraine without aura without status migrainosus, not intractable - Primary    Relevant Medications    verapamil (CALAN-SR) 120 mg CR tablet    Other Relevant Orders    Chemodenervation         Preventative:  -Verapamil 120 mg twice a day  - Patient to start taking vitamin-D a 1000 international units per day  - We will apply for Botox 200 units to see if this helps with her migraine headaches    Abortive:  - At onset of the migraine, take Rizatriptan 10 mg  If you still have a migraine in 2 hours, repeat Rizatriptan and take prochlorperazine with it  Rizatriptan no more than 3 a week  Prochlorperazine 10 pills a month    Subjective:  HPI   We had the pleasure of evaluating Jake Rivera in neurological follow up today  As you know she is a 56year old right handed female  She is a welfare   She is here today for evaluation of her headaches  Chronic Migraine/ Tension Headaches:   What medications do you take or have you taken for your headaches?    PREVENTIVE: Vitamin B2 400mg, amlodipine, Pamalor, mag oxide, venlafaxine, gabapentin- side effects, cyproheptadine, verapamil, zonisamide, topamax, protriptyline  ABORTIVE: Treximet, maxalt- helps, naproxen, dexamethasone, ibuprofen, toradol- did help break help    Non-Medical/Alternative Treatments used in the past for headaches: massage   Headache trigger: Fatigue, Stress/Tension, missing meals, chewing, lack of sleep, menstruation, weather    Aura: - none    How often do the headaches occur -   - TTH- 2-4 times a week;  -  Migraines- 3- 5-a week - More then 15 a month    What time of the day do the headaches start - Mostly in the am but wakes up in the middle of the night with migraines     How long do the headaches last -   TTH- all day;   Migraines 2hr to 2 days- more then 4 hours per day    Where are they located - start left side of head, frontal region, occipital, neck    What is the intensity of pain -  TTH- average pain level 5/10;   Migraines- average pain level 8-10/10    Describe your usual headache - migraines- Pressure, sharp; TTH- dull    Associated symptoms: nausea, Decrease appetite, photophobia, phonophobia, sensitive to smells, Problem with concentration, left pupil size change, light-headed or dizzy, stiff or sore neck, prefer to be alone and in a dark room, unable to work, irritable, easily frustrated     Sleep:   patient states not sleeping well  Very tired and fatgued  she is getting up very tired and has noted she is very fatigued  She is grinding her teeth  She also tells me that she was not able to go to sleep center in the past as her insurance did not cover it       Brain MRI wo contrast 2/26/15  - Several tiny bifrontal subcortical T2 FLAIR and hyperintense foci suspicious for mild chronic micro-vascular changes which may be associated with migraine headaches  - Mild multi-focal paranasal sinus disease w/o air-fluid levels       The following portions of the patient's history were reviewed and updated as appropriate: allergies, current medications, past family history, past medical history, past social history, past surgical history and problem list       Objective:  Blood pressure 130/86, pulse 102, height 5' 6" (1 676 m), weight 97 1 kg (214 lb)  Physical Exam   Constitutional: She appears well-developed  Eyes: Pupils are equal, round, and reactive to light  EOM are normal    Neck: Normal range of motion  Neck supple  Cardiovascular: Normal rate  Pulmonary/Chest: Effort normal    Abdominal: Soft  Musculoskeletal: Normal range of motion  Neurological: She has normal strength and normal reflexes  Skin: Skin is warm  Psychiatric: She has a normal mood and affect   Her speech is normal        Neurological Exam  Mental Status   Speech is normal  Language is fluent with no aphasia  Attention and concentration are normal     Cranial Nerves  CN II: Visual fields full to confrontation  CN III, IV, VI: Extraocular movements intact bilaterally  Pupils equal round and reactive to light bilaterally  CN V: Facial sensation is normal   CN VII: Full and symmetric facial movement  CN VIII: Hearing is normal   CN IX, X: Palate elevates symmetrically  Normal gag reflex  CN XI: Shoulder shrug strength is normal   CN XII: Tongue midline without atrophy or fasciculations  Motor   Strength is 5/5 throughout all four extremities  Sensory  Sensation is intact to light touch, pinprick, vibration and proprioception in all four extremities  Reflexes  Deep tendon reflexes are 2+ and symmetric in all four extremities with downgoing toes bilaterally  Coordination  Right: Finger-to-nose normal   Left: Finger-to-nose normal     Gait  Casual gait is normal including stance, stride, and arm swing  ROS:  Review of Systems   Constitutional: Negative for activity change, appetite change, chills, diaphoresis, fatigue, fever and unexpected weight change  HENT: Negative for congestion, dental problem, drooling, ear discharge, ear pain, facial swelling, hearing loss, mouth sores, nosebleeds, postnasal drip, rhinorrhea, sinus pain, sinus pressure, sneezing, sore throat, tinnitus, trouble swallowing and voice change  Eyes: Negative for photophobia, pain, discharge, redness, itching and visual disturbance  Respiratory: Negative for apnea, cough, choking, chest tightness, shortness of breath, wheezing and stridor  Cardiovascular: Negative for chest pain, palpitations and leg swelling  Gastrointestinal: Negative for abdominal distention, abdominal pain, anal bleeding, blood in stool, constipation, diarrhea, nausea, rectal pain and vomiting  Endocrine: Negative for cold intolerance, heat intolerance, polydipsia, polyphagia and polyuria     Genitourinary: Negative for decreased urine volume, difficulty urinating, dyspareunia, dysuria, enuresis, flank pain, frequency, genital sores, hematuria, menstrual problem, pelvic pain, urgency, vaginal bleeding, vaginal discharge and vaginal pain  Musculoskeletal: Negative for arthralgias, back pain, gait problem, joint swelling, myalgias, neck pain and neck stiffness  Skin: Negative for color change, pallor, rash and wound  Allergic/Immunologic: Negative for environmental allergies, food allergies and immunocompromised state  Neurological: Positive for headaches  Negative for dizziness, tremors, seizures, syncope, facial asymmetry, speech difficulty, weakness, light-headedness and numbness  Hematological: Negative for adenopathy  Does not bruise/bleed easily  Psychiatric/Behavioral: Negative for agitation, behavioral problems, confusion, decreased concentration, dysphoric mood, hallucinations, self-injury, sleep disturbance and suicidal ideas  The patient is not nervous/anxious and is not hyperactive

## 2018-12-18 ENCOUNTER — OFFICE VISIT (OUTPATIENT)
Dept: NEUROLOGY | Facility: CLINIC | Age: 48
End: 2018-12-18
Payer: COMMERCIAL

## 2018-12-18 VITALS
HEART RATE: 102 BPM | BODY MASS INDEX: 34.39 KG/M2 | SYSTOLIC BLOOD PRESSURE: 130 MMHG | WEIGHT: 214 LBS | HEIGHT: 66 IN | DIASTOLIC BLOOD PRESSURE: 86 MMHG

## 2018-12-18 DIAGNOSIS — G43.709 CHRONIC MIGRAINE WITHOUT AURA WITHOUT STATUS MIGRAINOSUS, NOT INTRACTABLE: Primary | ICD-10-CM

## 2018-12-18 PROBLEM — S06.0X0A CONCUSSION WITHOUT LOSS OF CONSCIOUSNESS: Status: RESOLVED | Noted: 2018-09-19 | Resolved: 2018-12-18

## 2018-12-18 PROCEDURE — 99214 OFFICE O/P EST MOD 30 MIN: CPT | Performed by: PSYCHIATRY & NEUROLOGY

## 2018-12-18 NOTE — PATIENT INSTRUCTIONS
Preventative:  -Verapamil 120 mg twice a day  - Patient to start taking vitamin-D a 1000 international units per day  - We will apply for Botox 200 units to see if this helps with her migraine headaches    Abortive:  - At onset of the migraine, take Rizatriptan 10 mg  If you still have a migraine in 2 hours, repeat Rizatriptan and take prochlorperazine with it  Rizatriptan no more than 3 a week    Prochlorperazine 10 pills a month  I

## 2018-12-19 ENCOUNTER — HOSPITAL ENCOUNTER (OUTPATIENT)
Dept: SLEEP CENTER | Facility: HOSPITAL | Age: 48
Discharge: HOME | End: 2018-12-19
Attending: INTERNAL MEDICINE
Payer: COMMERCIAL

## 2018-12-19 DIAGNOSIS — G47.33 OBSTRUCTIVE SLEEP APNEA: ICD-10-CM

## 2018-12-19 PROCEDURE — G0399 HOME SLEEP TEST/TYPE 3 PORTA: HCPCS

## 2018-12-26 DIAGNOSIS — Z00.00 WELLNESS EXAMINATION: Primary | ICD-10-CM

## 2018-12-27 ENCOUNTER — TELEPHONE (OUTPATIENT)
Dept: NEUROLOGY | Facility: CLINIC | Age: 48
End: 2018-12-27

## 2018-12-27 NOTE — TELEPHONE ENCOUNTER
Completed intermittent FMLA form sent to you for Dr Autumn Putnam signature  Please contact Praneeth Mcghee once signed so that charges may be entered  Thank you

## 2018-12-28 NOTE — TELEPHONE ENCOUNTER
Charge entered into [facility]([id number])  Please contact patient to coordinate payment and pickup and scan into chart if necessary  Thanks

## 2018-12-29 ENCOUNTER — LAB (OUTPATIENT)
Dept: LAB | Facility: MEDICAL CENTER | Age: 48
End: 2018-12-29
Payer: COMMERCIAL

## 2018-12-29 DIAGNOSIS — Z00.00 WELLNESS EXAMINATION: ICD-10-CM

## 2018-12-29 LAB
EST. AVERAGE GLUCOSE BLD GHB EST-MCNC: 114 MG/DL
HBA1C MFR BLD: 5.6 % (ref 4.2–6.3)

## 2018-12-29 PROCEDURE — 83036 HEMOGLOBIN GLYCOSYLATED A1C: CPT

## 2018-12-29 PROCEDURE — 36415 COLL VENOUS BLD VENIPUNCTURE: CPT

## 2019-01-21 ENCOUNTER — HOSPITAL ENCOUNTER (OUTPATIENT)
Dept: MAMMOGRAPHY | Facility: HOSPITAL | Age: 49
Discharge: HOME | End: 2019-01-21
Payer: COMMERCIAL

## 2019-01-21 VITALS — HEIGHT: 66 IN | BODY MASS INDEX: 34.39 KG/M2 | WEIGHT: 214 LBS

## 2019-01-21 DIAGNOSIS — Z12.31 VISIT FOR SCREENING MAMMOGRAM: ICD-10-CM

## 2019-01-21 PROCEDURE — 77063 BREAST TOMOSYNTHESIS BI: CPT

## 2019-01-21 PROCEDURE — 77067 SCR MAMMO BI INCL CAD: CPT

## 2019-01-25 ENCOUNTER — OFFICE VISIT (OUTPATIENT)
Dept: OBGYN CLINIC | Facility: CLINIC | Age: 49
End: 2019-01-25
Payer: COMMERCIAL

## 2019-01-25 VITALS
HEART RATE: 102 BPM | DIASTOLIC BLOOD PRESSURE: 100 MMHG | HEIGHT: 66 IN | BODY MASS INDEX: 34.55 KG/M2 | WEIGHT: 215 LBS | RESPIRATION RATE: 16 BRPM | SYSTOLIC BLOOD PRESSURE: 143 MMHG

## 2019-01-25 DIAGNOSIS — G56.23 CUBITAL TUNNEL SYNDROME OF BOTH UPPER EXTREMITIES: Primary | ICD-10-CM

## 2019-01-25 PROCEDURE — 99203 OFFICE O/P NEW LOW 30 MIN: CPT | Performed by: FAMILY MEDICINE

## 2019-01-25 NOTE — PROGRESS NOTES
Assessment/Plan:  Assessment/Plan   Diagnoses and all orders for this visit:    Cubital tunnel syndrome of both upper extremities  -     EMG 2 Limb Upper Extremity; Future  -     Ambulatory referral to Orthopedic Surgery; Future      78-year-old right-hand-dominant female with pain and numbness/tingling of both upper extremities  Discussed with patient physical exam, impression and plan  Physical exam is currently unremarkable for any bony or soft tissue tenderness of the upper extremities  He demonstrates normal range of motion, strength, and biceps reflex are symmetrical bilaterally  Tinel's testing of cubital tunnel, median nerve, and radial nerve are unremarkable  Since her symptoms have recurred and she has had multiple corticosteroid injections I will refer her for EMG studies of both upper extremities and orthopedic hand and elbow specialist for further evaluation and recommendation of treatment  Subjective:   Patient ID: Sherman Valente is a 53 y o  female  Chief Complaint   Patient presents with    Right Arm - Pain       78-year-old right-hand-dominant female presents for evaluation of pain and numbness/tingling both upper extremities, right worse than left of worsening more than few weeks duration  She reports having been diagnosed with cubital syndrome of both upper extremities 4 years ago  She has since had 3 corticosteroid injections and states that after the 1st and 2nd injection she had improvement, but after the 3rd injection she did not have any improvement  She also completed formal physical therapy  She states that symptoms have been tolerable up until few weeks ago  Since then pain and numbness/tingling right upper extremities worsen  She has pain that originates from the posterior medial aspect of the elbow and radiates distally on the forearm and sometimes proximal to the shoulder, intermittent, sharp, worse at night, and associated with numbness/tingling    She has resumed doing her previously taught stretching and exercises which did not help  Arm Pain   This is a new problem  The current episode started 1 to 4 weeks ago  The problem occurs intermittently  The problem has been gradually worsening  Associated symptoms include arthralgias and numbness  Pertinent negatives include no joint swelling or weakness  She has tried rest (Stretching, exercises) for the symptoms  The treatment provided no relief  The following portions of the patient's history were reviewed and updated as appropriate: She  has a past medical history of Anxiety; Endometriosis; Esophageal reflux; High cholesterol; and Plantar fasciitis  She  has a past surgical history that includes  section; Foot surgery (Left); and LAPAROSCOPY  Her family history includes Asperger's syndrome in her family; Bipolar disorder in her family; Cancer in her maternal uncle; Colon cancer in her father, maternal grandmother, and mother; Depression in her family; Diabetes in her family; Hypertension in her family, father, and mother; No Known Problems in her maternal grandfather, paternal grandfather, and paternal grandmother; Raynaud syndrome in her family  She was adopted  She  reports that she has never smoked  She has never used smokeless tobacco  She reports that she drinks alcohol  She reports that she does not use drugs  She is allergic to gabapentin and no active allergies       Review of Systems   Musculoskeletal: Positive for arthralgias  Negative for joint swelling  Neurological: Positive for numbness  Negative for weakness  Objective:  Vitals:    19 1419   BP: 143/100   BP Location: Left arm   Patient Position: Sitting   Cuff Size: Large   Pulse: 102   Resp: 16   Weight: 97 5 kg (215 lb)   Height: 5' 6" (1 676 m)     Right Hand Exam     Muscle Strength   The patient has normal right wrist strength      Tests   Phalens Sign: negative  Tinels Sign (Medial Nerve): negative      Left Hand Exam     Muscle Strength   The patient has normal left wrist strength  Tests   Phalens Sign: negative  Tinels Sign (Medial Nerve): negative      Right Elbow Exam     Tenderness   The patient is experiencing no tenderness  Range of Motion   The patient has normal right elbow ROM  Muscle Strength   Pronation:  5/5   Supination:  5/5     Tests Tinel's Sign (cubital tunnel): negative      Left Elbow Exam     Tenderness   The patient is experiencing no tenderness  Range of Motion   The patient has normal left elbow ROM  Muscle Strength   Pronation:  5/5   Supination:  5/5     Tests Tinel's Sign (cubital tunnel): negative      Right Shoulder Exam     Range of Motion   The patient has normal right shoulder ROM  Muscle Strength   The patient has normal right shoulder strength  Left Shoulder Exam     Range of Motion   The patient has normal left shoulder ROM  Muscle Strength   The patient has normal left shoulder strength  Observations     Left Wrist/Hand   Negative for deformity  Right Wrist/Hand   Negative for deformity  Tenderness     Left Wrist/Hand   No tenderness in the first dorsal compartment, sixth dorsal compartment, carpometacarpal joint, common extensor tendon, distal biceps tendon, distal triceps tendon, lateral epicondyle, medial epicondyle, triangular fibrocartilage complex , scaphoid and lunate  Right Wrist/Hand   No tenderness in the first dorsal compartment, sixth dorsal compartment, carpometacarpal joint, common extensor tendon, distal biceps tendon, distal triceps tendon, lateral epicondyle, medial epicondyle, triangular fibrocartilage complex , scaphoid and lunate       Active Range of Motion     Left Wrist   Normal active range of motion    Right Wrist   Normal active range of motion    Additional Active Range of Motion Details  Normal range of motion of fingers of both hands    Strength/Myotome Testing     Left Wrist/Hand   Normal wrist strength     (2nd hand position)     Trial 1: 5    Right Wrist/Hand   Normal wrist strength     (2nd hand position)     Trial 1: 5    Tests     Left Wrist/Hand   Negative AIN OK sign, Froment's sign, Phalen's sign, Tinel's sign (medial nerve) and Tinel's sign (radial tunnel)  Right Wrist/Hand   Negative AIN OK sign, Froment's sign, Phalen's sign, Tinel's sign (medial nerve) and Tinel's sign (radial tunnel)  Physical Exam   Constitutional: She is oriented to person, place, and time  She appears well-developed  No distress  HENT:   Head: Normocephalic  Eyes: Conjunctivae are normal    Neck: No tracheal deviation present  Cardiovascular: Normal rate  Pulmonary/Chest: Effort normal  No respiratory distress  Abdominal: She exhibits no distension  Musculoskeletal:        Right elbow: No medial epicondyle and no lateral epicondyle tenderness noted  Left elbow: No medial epicondyle and no lateral epicondyle tenderness noted  Right hand: She exhibits no deformity  Left hand: She exhibits no deformity  Neurological: She is alert and oriented to person, place, and time  Skin: Skin is warm and dry  Psychiatric: She has a normal mood and affect  Her behavior is normal    Nursing note and vitals reviewed

## 2019-01-29 DIAGNOSIS — G47.33 OSA (OBSTRUCTIVE SLEEP APNEA): Primary | ICD-10-CM

## 2019-01-30 NOTE — TELEPHONE ENCOUNTER
Payment of $15 collected over the phone  Form faxed as requested  Mailed receipt to pt's home address as verified

## 2019-01-30 NOTE — TELEPHONE ENCOUNTER
Patient called to pay for forms over the phone    Transferred to [place], [address] [place] [address] at [facility]

## 2019-02-04 ENCOUNTER — TELEPHONE (OUTPATIENT)
Dept: NEUROLOGY | Facility: CLINIC | Age: 49
End: 2019-02-04

## 2019-02-04 ENCOUNTER — TELEPHONE (OUTPATIENT)
Dept: SLEEP CENTER | Facility: CLINIC | Age: 49
End: 2019-02-04

## 2019-02-04 DIAGNOSIS — G43.709 CHRONIC MIGRAINE WITHOUT AURA WITHOUT STATUS MIGRAINOSUS, NOT INTRACTABLE: Primary | ICD-10-CM

## 2019-02-04 NOTE — TELEPHONE ENCOUNTER
Demographics, script, insurance cars, last office notes, and the referral were faxed over to [facility]

## 2019-02-04 NOTE — TELEPHONE ENCOUNTER
Referral Notes   Number of Notes: 1   Type Date User Summary Attachment   General 01/30/2019 12:28 PM Elpidio Beal care coordination -   Note    Auth not needed     Please use [facility] specialty pharmacy      Thank you

## 2019-02-04 NOTE — TELEPHONE ENCOUNTER
Lazaro from [facility] requested data from home sleep study  Advised that data will be put into [employer] and then will be supplied to Darryn Johnson at [facility] contacted

## 2019-02-04 NOTE — PROGRESS NOTES
Assessment/Plan:    Chronic sinusitis  Orders and recommendations as noted below  She should watch for any persistent symptoms  Advised her to take the Augmentin with a meal   May need steroids for further treatment if her symptoms persist or worsen  Continue with the Allegra  Continue with the Flonase  Call or follow-up if symptoms worsen or persist     Obstructive sleep apnea  She was recently diagnosed with obstructive sleep apnea  Advised her to follow up with sleep specialist as scheduled later this week  Discussed that sleep apnea can cause some of the symptoms that she has been having especially the significant fatigue  Chronic migraine without aura without status migrainosus, not intractable  Continue to follow up with Neurology  Follow-up for the Botox injections as planned later this month  Discussed with her that treatment of sleep apnea, if sleep specialist feels this is warranted, may help her headache somewhat  Hypertension  Blood pressure relatively well controlled  Continue with the verapamil  Potential side effect of peripheral edema discussed with her since she has been having some issues with this on and off  Breast lump on right side at 10 o'clock position  There is a palpable lump approximately 1 cm on the right breast   This is tender to palpation  Discussed possible causes for this with her  She is up-to-date on her mammogram   Will get an ultrasound for further investigation  Fatigue  She has persistent fatigue  Follow-up with the sleep specialist to discuss the recent sleep study and to discuss possible treatment strategies  Hyperlipidemia  Cholesterol has improved on the atorvastatin but not at goal   Will increase the atorvastatin to 20 mg on a daily basis  Watch diet  Increase fiber in diet  Increase activity level  Will recheck lipid panel prior to her next visit  Insomnia  Follow-up with sleep specialist as planned      Peripheral edema  She does have trace to 1+ peripheral edema bilaterally  This is likely related to the verapamil but with her issues with persistent mild tachycardia will check an echocardiogram for further investigation  Watch salt intake  Tachycardia  Heart rate persistently, but mildly, elevated  She does sometimes feel her heart racing at home as well  Will check an echocardiogram for further investigation especially with the peripheral edema  Vitamin D deficiency  Continue vitamin-D supplementation  Will continue to follow her vitamin-D level about 2 to 3 times a year  Diagnoses and all orders for this visit:    Essential hypertension  -     Echo complete with contrast if indicated; Future  -     CBC and differential; Future  -     Comprehensive metabolic panel; Future    Mixed hyperlipidemia  -     atorvastatin (LIPITOR) 20 mg tablet; Take 1 tablet (20 mg total) by mouth daily  -     Comprehensive metabolic panel; Future  -     Lipid panel; Future  -     TSH, 3rd generation; Future    Obstructive sleep apnea  -     Echo complete with contrast if indicated; Future    Chronic sinusitis, unspecified location  -     amoxicillin-clavulanate (AUGMENTIN) 875-125 mg per tablet; Take 1 tablet by mouth 2 (two) times a day for 7 days    Chronic migraine without aura without status migrainosus, not intractable  -     CBC and differential; Future    Fatigue, unspecified type  -     CBC and differential; Future    Vitamin D deficiency  -     Vitamin D 25 hydroxy; Future    Insomnia, unspecified type  -     CBC and differential; Future    Peripheral edema  -     Echo complete with contrast if indicated; Future  -     NT-BNP PRO; Future    Tachycardia  -     Echo complete with contrast if indicated; Future  -     CBC and differential; Future  -     TSH, 3rd generation; Future    Breast lump on right side at 10 o'clock position  -     US breast right limited (diagnostic); Future        Orders recommendations as noted above    Continue to follow up with numerous specialists  She is up-to-date on her mammogram   Ultrasound of the right breast as noted above  She is up-to-date on the flu shot  Will have her follow up in about 3-4 months with laboratory testing prior to that visit  Follow up sooner if needed  Subjective:      Patient ID: Jake Garcia is a 53 y o  female  She presents for routine follow-up  Has generally been feeling poorly with sinus symptoms for about 6 weeks  Has had significant postnasal drip  Increasing sinus headaches  Some pressure into her ears  Intermittent cough  Had felt feverish at times over the past 6 weeks  She continues to follow-up with Neurology for her chronic headaches  These have worsened somewhat with the sinus symptoms  She will be going for a Botox injection on February 20th  She is hopeful that this will help given that she has been on numerous medications that have not been overly effective and had caused some side effects  She states that she does feel clear off of the numerous medications  Still feels significantly exhausted  She still feels tired even after sleeping a long time  She did go for sleep study and will be following up with the sleep specialist later this week  Denies any chest pain but has noticed some palpitations at times  Tolerating the rapid mL relatively well  She has had some recent peripheral edema over the last month or so  Recently had some soreness in her right breast and had felt lump  She is up-to-date on her mammogram   Allergy symptoms have been relatively stable other than the recent sinus issues  Denies any recent reflux symptoms  Continues to take the Zantac  Tolerating the atorvastatin without difficulty  Appetite has been somewhat decreased with the recent illness  Denies any nausea, vomiting, or diarrhea          The following portions of the patient's history were reviewed and updated as appropriate:   She  has a past medical history of Anxiety; Endometriosis; Esophageal reflux; High cholesterol; and Plantar fasciitis  She   Patient Active Problem List    Diagnosis Date Noted    Peripheral edema 2019    Tachycardia 2019    Breast lump on right side at 10 o'clock position 2019    Hypersomnia 2018    Chronic rhinitis 2018    Obesity (BMI 30-39 9) 2018    Chondromalacia patellae 10/05/2018    Screening for diabetes mellitus 10/05/2018    Chronic migraine without aura without status migrainosus, not intractable 2018    Insomnia 2018    Vitamin D deficiency 2017    Menorrhagia with irregular cycle 2017    Anxiety 2017    Hyperlipidemia 2016    Nonspecific abnormal results of function study of thyroid 2016    Fatigue 2015    Hypertension 2015    Obstructive sleep apnea 2015    Asthma 2013    Chronic sinusitis 2013     She  has a past surgical history that includes  section; Foot surgery (Left); and LAPAROSCOPY  Her family history includes Asperger's syndrome in her family; Bipolar disorder in her family; Cancer in her maternal uncle; Colon cancer in her father, maternal grandmother, and mother; Depression in her family; Diabetes in her family; Hypertension in her family, father, and mother; No Known Problems in her maternal grandfather, paternal grandfather, and paternal grandmother; Raynaud syndrome in her family  She was adopted  She  reports that she has never smoked  She has never used smokeless tobacco  She reports that she drinks alcohol  She reports that she does not use drugs    Current Outpatient Prescriptions   Medication Sig Dispense Refill    albuterol (PROVENTIL HFA) 90 mcg/act inhaler Proventil HFA 90 mcg/actuation aerosol inhaler      Botulinum Toxin Type A 200 units SOLR Inject up to 200 units IM into the head and neck muscles by physician every three months 200 Units 4    fexofenadine (ALLEGRA ALLERGY) 180 MG tablet Take by mouth      fluticasone (FLONASE) 50 mcg/act nasal spray instill 2 sprays into each nostril once daily 16 g 0    montelukast (SINGULAIR) 10 mg tablet Take 10 mg by mouth daily at bedtime  0    Olopatadine HCl 0 6 % SOLN INSTILL 1-2 SPRAYS INTO EACH NOSTRIL TWICE A DAY AS NEEDED  0    ranitidine (ZANTAC) 300 MG tablet Take by mouth as needed        verapamil (CALAN-SR) 120 mg CR tablet Twice a day 60 tablet 6    amoxicillin-clavulanate (AUGMENTIN) 875-125 mg per tablet Take 1 tablet by mouth 2 (two) times a day for 7 days 14 tablet 0    atorvastatin (LIPITOR) 20 mg tablet Take 1 tablet (20 mg total) by mouth daily 90 tablet 3    rizatriptan (MAXALT) 10 MG tablet Take 1 tab at onset of headache, may repeat in 2 hours, max 2 tabs in 24 hours 12 tablet 5     No current facility-administered medications for this visit  Current Outpatient Prescriptions on File Prior to Visit   Medication Sig    albuterol (PROVENTIL HFA) 90 mcg/act inhaler Proventil HFA 90 mcg/actuation aerosol inhaler    Botulinum Toxin Type A 200 units SOLR Inject up to 200 units IM into the head and neck muscles by physician every three months    fexofenadine (ALLEGRA ALLERGY) 180 MG tablet Take by mouth    fluticasone (FLONASE) 50 mcg/act nasal spray instill 2 sprays into each nostril once daily    montelukast (SINGULAIR) 10 mg tablet Take 10 mg by mouth daily at bedtime    Olopatadine HCl 0 6 % SOLN INSTILL 1-2 SPRAYS INTO EACH NOSTRIL TWICE A DAY AS NEEDED    ranitidine (ZANTAC) 300 MG tablet Take by mouth as needed      verapamil (CALAN-SR) 120 mg CR tablet Twice a day     No current facility-administered medications on file prior to visit  She is allergic to gabapentin and no active allergies       Review of Systems   Constitutional: Positive for fatigue  Negative for activity change, appetite change, chills and fever          As per HPI   HENT: Positive for congestion, postnasal drip, rhinorrhea, sinus pain, sinus pressure and sore throat  Eyes: Negative for visual disturbance  Respiratory: Positive for cough  Negative for chest tightness and shortness of breath  Cardiovascular: Positive for leg swelling  Negative for chest pain  Gastrointestinal: Negative for abdominal pain, blood in stool, diarrhea, nausea and vomiting  Endocrine: Negative for polydipsia, polyphagia and polyuria  Genitourinary: Negative for dysuria, frequency and urgency  As per HPI   Musculoskeletal: Positive for arthralgias  Negative for gait problem  Skin: Negative for color change  Neurological: Positive for headaches  Negative for dizziness and light-headedness  As per HPI   Hematological: Does not bruise/bleed easily  Psychiatric/Behavioral: Positive for decreased concentration and sleep disturbance  Negative for confusion  The patient is nervous/anxious  As per HPI         Objective:      /80 (BP Location: Left arm, Patient Position: Sitting, Cuff Size: Large)   Pulse (!) 106   Temp 98 5 °F (36 9 °C) (Temporal)   Ht 5' 6" (1 676 m)   Wt 95 3 kg (210 lb)   SpO2 96%   BMI 33 89 kg/m²          Physical Exam   Constitutional: She is oriented to person, place, and time  She is cooperative  No distress  Tired appearing   HENT:   Head: Normocephalic and atraumatic  Mouth/Throat: Oropharynx is clear and moist    Boggy nasal mucosa with purulent nasal discharge bilaterally; maxillary sinus tenderness; posterior pharyngeal erythema   Eyes: Pupils are equal, round, and reactive to light  Conjunctivae are normal  Right eye exhibits no discharge  Left eye exhibits no discharge  Neck: Normal range of motion  No JVD present  Carotid bruit is not present  No thyromegaly present  Cardiovascular: Regular rhythm and normal heart sounds  Tachycardia present  Exam reveals no gallop and no friction rub  No murmur heard    Trace peripheral edema bilaterally; heart rate on auscultation 112   Pulmonary/Chest: No respiratory distress  She has no wheezes  She has no rales  Scattered rhonchi   Abdominal: Bowel sounds are normal  She exhibits no distension  There is no tenderness  Genitourinary:   Genitourinary Comments: Right breast with 1-2 cm firm, tender, movable mass   Musculoskeletal: She exhibits no edema  Lymphadenopathy:     She has cervical adenopathy  Right: No supraclavicular adenopathy present  Left: No supraclavicular adenopathy present  Neurological: She is alert and oriented to person, place, and time  Skin: Skin is warm and dry  Psychiatric: Her behavior is normal  Judgment normal  Her mood appears anxious  Cognition and memory are normal    Nursing note and vitals reviewed

## 2019-02-05 ENCOUNTER — OFFICE VISIT (OUTPATIENT)
Dept: INTERNAL MEDICINE CLINIC | Facility: CLINIC | Age: 49
End: 2019-02-05
Payer: COMMERCIAL

## 2019-02-05 VITALS
OXYGEN SATURATION: 96 % | TEMPERATURE: 98.5 F | HEIGHT: 66 IN | HEART RATE: 106 BPM | WEIGHT: 210 LBS | SYSTOLIC BLOOD PRESSURE: 130 MMHG | BODY MASS INDEX: 33.75 KG/M2 | DIASTOLIC BLOOD PRESSURE: 80 MMHG

## 2019-02-05 DIAGNOSIS — N63.11 BREAST LUMP ON RIGHT SIDE AT 10 O'CLOCK POSITION: ICD-10-CM

## 2019-02-05 DIAGNOSIS — E55.9 VITAMIN D DEFICIENCY: ICD-10-CM

## 2019-02-05 DIAGNOSIS — E78.2 MIXED HYPERLIPIDEMIA: ICD-10-CM

## 2019-02-05 DIAGNOSIS — G47.33 OBSTRUCTIVE SLEEP APNEA: ICD-10-CM

## 2019-02-05 DIAGNOSIS — R00.0 TACHYCARDIA: ICD-10-CM

## 2019-02-05 DIAGNOSIS — G47.00 INSOMNIA, UNSPECIFIED TYPE: ICD-10-CM

## 2019-02-05 DIAGNOSIS — R60.9 PERIPHERAL EDEMA: ICD-10-CM

## 2019-02-05 DIAGNOSIS — J32.9 CHRONIC SINUSITIS, UNSPECIFIED LOCATION: ICD-10-CM

## 2019-02-05 DIAGNOSIS — R53.83 FATIGUE, UNSPECIFIED TYPE: ICD-10-CM

## 2019-02-05 DIAGNOSIS — G43.709 CHRONIC MIGRAINE WITHOUT AURA WITHOUT STATUS MIGRAINOSUS, NOT INTRACTABLE: ICD-10-CM

## 2019-02-05 DIAGNOSIS — I10 ESSENTIAL HYPERTENSION: Primary | ICD-10-CM

## 2019-02-05 PROBLEM — R60.0 PERIPHERAL EDEMA: Status: ACTIVE | Noted: 2019-02-05

## 2019-02-05 PROCEDURE — 3075F SYST BP GE 130 - 139MM HG: CPT | Performed by: FAMILY MEDICINE

## 2019-02-05 PROCEDURE — 3079F DIAST BP 80-89 MM HG: CPT | Performed by: FAMILY MEDICINE

## 2019-02-05 PROCEDURE — 99214 OFFICE O/P EST MOD 30 MIN: CPT | Performed by: FAMILY MEDICINE

## 2019-02-05 RX ORDER — ATORVASTATIN CALCIUM 20 MG/1
20 TABLET, FILM COATED ORAL DAILY
Qty: 90 TABLET | Refills: 3 | Status: SHIPPED | OUTPATIENT
Start: 2019-02-05 | End: 2020-03-26 | Stop reason: SDUPTHER

## 2019-02-05 RX ORDER — AMOXICILLIN AND CLAVULANATE POTASSIUM 875; 125 MG/1; MG/1
1 TABLET, FILM COATED ORAL 2 TIMES DAILY
Qty: 14 TABLET | Refills: 0 | Status: SHIPPED | OUTPATIENT
Start: 2019-02-05 | End: 2019-02-11 | Stop reason: ALTCHOICE

## 2019-02-05 NOTE — PATIENT INSTRUCTIONS
Sleep Apnea   WHAT YOU NEED TO KNOW:   What is sleep apnea? Sleep apnea is also called obstructive sleep apnea  It is a condition that causes you to stop breathing for 10 seconds or more while you are sleeping  During normal sleep, your throat is kept open by muscles that let air pass through easily  Sleep apnea causes the muscles and tissues around your throat to relax and block air from passing through  Sleep apnea may happen many times while you are asleep  What increases my risk for sleep apnea? · Drinking alcohol or taking medicine to relax you before you sleep    · Obesity or a neck measuring 16 inches (41 centimeters) or more     · Smoking cigarettes    · Being a man or a menopausal woman    · A family history of sleep apnea    · Certain medical conditions, such as high blood pressure, diabetes, or stroke     · A deviated septum, a large tongue or tonsils, an overbite, or a small chin  What are the signs and symptoms of sleep apnea? · No signs of breathing for 10 seconds or more while you sleep    · Snoring loudly, snorting, gasping or choking while you sleep, and waking up suddenly because of these    · A hard time thinking, remembering things, or focusing on your tasks the following day    · Headache or nausea    · Bedwetting or waking up often during the night to urinate    · Feeling sleepy, slow, and tired during the day  How is sleep apnea diagnosed? · Your healthcare provider will ask about your signs and symptoms, when they began, and how bad they are  He may ask about medical conditions you have and what medicines you take  Tell your healthcare provider if you smoke and whether anyone in your family has sleep apnea  Your healthcare provider may ask the person who sleeps beside you about your signs  · You may need to wear a device that monitors the oxygen level in your blood while you sleep  You may need to have a sleep study (polysomnography) if you have daytime sleepiness   A sleep study helps show your brain, heart, and respiratory system are working during sleep  Sleep studies may monitor the stages of sleep, oxygen levels, body position, eye movement, and snoring  How is sleep apnea treated? · A continuous positive airway pressure (CPAP)  machine is used to keep your airway open during sleep  A mask is placed over your nose and mouth, or just your nose  The mask is hooked to the CPAP machine  The CPAP blows a gentle stream of air into the mask when you breathe  This helps keep your airway open so you can breathe more regularly  Extra oxygen may be given through the machine  · A mouth device  may be recommended by your healthcare provider  The device looks like a mouth guard or dental retainer  It stops your tongue and mouth tissues from blocking your throat while you sleep  · Surgery  may be needed to remove extra tissues that block your mouth, throat, or nose  How can I manage my symptoms? · Do not smoke  Nicotine and other chemicals in cigarettes and cigars can cause lung damage  Ask your healthcare provider for information if you currently smoke and need help to quit  E-cigarettes or smokeless tobacco still contain nicotine  Talk to your healthcare provider before you use these products  · Do not drink alcohol or take sedative medicine before you go to sleep  Alcohol and sedatives can relax the muscles and tissues around your throat  This can block the airflow to your lungs  · Maintain a healthy weight  Excess tissue around your throat may restrict your breathing  Ask your healthcare provider for information if you need to lose weight  · Sleep on your side or use pillows designed to prevent sleep apnea  This prevents your tongue or other tissues from blocking your throat  You can also raise the head of your bed  When should I seek immediate care? · You have chest pain or trouble breathing  When should I contact my healthcare provider?    · You feel very tired or depressed  · You have trouble staying awake during the day  · You have trouble thinking and remembering things  · You have questions or concerns about your condition or care  CARE AGREEMENT:   You have the right to help plan your care  Learn about your health condition and how it may be treated  Discuss treatment options with your caregivers to decide what care you want to receive  You always have the right to refuse treatment  The above information is an  only  It is not intended as medical advice for individual conditions or treatments  Talk to your doctor, nurse or pharmacist before following any medical regimen to see if it is safe and effective for you  © 2017 [employer] Information is for End User's use only and may not be sold, redistributed or otherwise used for commercial purposes  All illustrations and images included in CareNotes® are the copyrighted property of A D A M , Inc  or Luis Bradley

## 2019-02-06 ENCOUNTER — TELEPHONE (OUTPATIENT)
Dept: INTERNAL MEDICINE CLINIC | Facility: CLINIC | Age: 49
End: 2019-02-06

## 2019-02-06 DIAGNOSIS — G43.709 CHRONIC MIGRAINE WITHOUT AURA WITHOUT STATUS MIGRAINOSUS, NOT INTRACTABLE: Primary | ICD-10-CM

## 2019-02-06 RX ORDER — RIZATRIPTAN BENZOATE 10 MG/1
TABLET ORAL
Qty: 12 TABLET | Refills: 5 | Status: SHIPPED | OUTPATIENT
Start: 2019-02-06

## 2019-02-06 NOTE — TELEPHONE ENCOUNTER
I called judy to check and see if she needs authorizations on her ultrasound and echo  Per insurance company no authorizations are needed   Ref# [id number]

## 2019-02-07 ENCOUNTER — TELEPHONE (OUTPATIENT)
Dept: NEUROLOGY | Facility: CLINIC | Age: 49
End: 2019-02-07

## 2019-02-07 ENCOUNTER — OFFICE VISIT (OUTPATIENT)
Dept: SLEEP CENTER | Facility: CLINIC | Age: 49
End: 2019-02-07
Payer: COMMERCIAL

## 2019-02-07 VITALS
DIASTOLIC BLOOD PRESSURE: 98 MMHG | HEIGHT: 66 IN | WEIGHT: 212.8 LBS | OXYGEN SATURATION: 95 % | SYSTOLIC BLOOD PRESSURE: 152 MMHG | BODY MASS INDEX: 34.2 KG/M2

## 2019-02-07 DIAGNOSIS — I10 ESSENTIAL HYPERTENSION: ICD-10-CM

## 2019-02-07 DIAGNOSIS — J31.0 CHRONIC RHINITIS: ICD-10-CM

## 2019-02-07 DIAGNOSIS — G43.709 CHRONIC MIGRAINE WITHOUT AURA WITHOUT STATUS MIGRAINOSUS, NOT INTRACTABLE: ICD-10-CM

## 2019-02-07 DIAGNOSIS — F41.9 ANXIETY: ICD-10-CM

## 2019-02-07 DIAGNOSIS — J45.20 MILD INTERMITTENT ASTHMA WITHOUT COMPLICATION: ICD-10-CM

## 2019-02-07 DIAGNOSIS — G47.00 INSOMNIA, UNSPECIFIED TYPE: ICD-10-CM

## 2019-02-07 DIAGNOSIS — E66.9 OBESITY (BMI 30-39.9): ICD-10-CM

## 2019-02-07 DIAGNOSIS — G47.33 OBSTRUCTIVE SLEEP APNEA: Primary | ICD-10-CM

## 2019-02-07 DIAGNOSIS — R51.9 HEADACHE UPON AWAKENING: ICD-10-CM

## 2019-02-07 DIAGNOSIS — G47.10 HYPERSOMNIA: ICD-10-CM

## 2019-02-07 PROCEDURE — 99214 OFFICE O/P EST MOD 30 MIN: CPT | Performed by: INTERNAL MEDICINE

## 2019-02-07 NOTE — ASSESSMENT & PLAN NOTE
She was recently diagnosed with obstructive sleep apnea  Advised her to follow up with sleep specialist as scheduled later this week  Discussed that sleep apnea can cause some of the symptoms that she has been having especially the significant fatigue

## 2019-02-07 NOTE — TELEPHONE ENCOUNTER
fyi:    Dominique boucher/ [place] office of adm HR called and states that fmla form is incomplete  Missing date when it was completed  Form was completed on 12/31/18  Form was updated w/ date 12/31/18 and faxed to [phone]

## 2019-02-07 NOTE — PROGRESS NOTES
Review of Systems      Genitourinary none   Cardiology ankle/leg swelling   Gastrointestinal none   Neurology frequent headaches and awaken with headache   Constitutional fatigue   Integumentary none   Psychiatry anxiety   Musculoskeletal none   Pulmonary shortness of breath with activity and snoring   ENT none   Endocrine none   Hematological none

## 2019-02-07 NOTE — PROGRESS NOTES
Follow-up Note - Sleep Center   Kathyleen Fothergill  53 y o  female  :1970  DOC:[id number]    I saw Miles Farr in sleep clinic today for her sleep complaints & medical conditions  A home sleep study was undertaken to evaluate for sleep disordered breathing and   patient is here to review results and further options  The study demonstrated a KESHAWN (respiratory event index of) 7 3 /hour  Minimum oxygen saturation was 278% and 17 2% of the study was spent with saturations less than 90%  The snore index was 61%  PFSH, Problem List, Medications & Allergies were reviewed in EMR  Interval changes: none reported  She  has a past medical history of Anxiety; Endometriosis; Esophageal reflux; High cholesterol; and Plantar fasciitis  She has a current medication list which includes the following prescription(s): albuterol, amoxicillin-clavulanate, atorvastatin, botulinum toxin type a, fexofenadine, fluticasone, montelukast, olopatadine hcl, ranitidine, rizatriptan, and verapamil  ROS: reviewed see attached  Significant for increase sinus symptoms  She has episodic exacerbations of her asthma  HPI:  She continues to snore and has ongoing symptoms as outlined in the initial report  However, her insurance has declined an in-lab titration study  Sleep Routine:  She is spending 7-1/2 hours in bed but has difficulty both initiating and maintaining sleep  She takes approximately an hour to fall asleep  She has frequent interruptions of sleep and struggles to fall back asleep  She has racing thoughts  She needs an alarm to awaken and struggles to get out of bed  She has headache on on awakening most days of the week  [She has excessive drowsiness and has to take naps during her lunch break or dozes off unintentionally when sedentary at home   She rated herself at Total score: 14 /24 on the Biscoe sleepiness scale ]          EXAM: /98   Ht 5' 6" (1 676 m)   Wt 96 5 kg (212 lb 12 8 oz)   SpO2 95%   BMI 34 35 kg/m²     Patient is well groomed; well appearing  Skin/Extrem: warm & dry; col & hydration normal; no edema  Psych: cooperativeand in no distress  Mental state appears normal   CNS: Alert, orientated, clear & coherent speech  H&N: EOMI; NC/AT:no facial pressure marks, no rashes  Neck Circumference: 35 5 cm  ENMT Mucus membranes appear normal Nasal airway:patent  Oral airway:  crowded  Resp:effort is normal CVS: RRR ABD:truncal obesity MSK:Gait normal     IMPRESSION: Primary Sleep/Secondary(to Medical or Psych conditions) & comorbidities   1  Obstructive sleep apnea     2  Insomnia, unspecified type     3  Headache upon awakening     4  Hypersomnia     5  Chronic rhinitis     6  Mild intermittent asthma without complication     7  Chronic migraine without aura without status migrainosus, not intractable     8  Essential hypertension     9  Obesity (BMI 30-39 9)     10  Anxiety         PLAN:    1  I reviewed results of the Sleep studies with the patient  2  With respect to above conditions, I counseled on pathophysiology, diagnosis, treatment options, risks and benefits; inter-relationship and effects on symptoms and comorbidities; risks of no treatment; costs and insurance aspects  3  Patient elected positive airway pressure therapy and care coordinated with the DME provider for set up in clinic today  4  Need for compliance with therapy and weight reduction were emphasized  5  Cognitive behavioral therapy was initiated, Sleep Hygiene and behavioral techniques to manage Insomnia were discussed  6  Follow-up to be scheduled in 2 months to monitor compliance, progress and to adjust therapy  Thank you for allowing me to participate in the care of this patient  I will keep you apprised of developments      Sincerely,    Authenticated electronically by Alo Ledesma MD on 43/17/40   Board Certified Specialist

## 2019-02-07 NOTE — ASSESSMENT & PLAN NOTE
She does have trace to 1+ peripheral edema bilaterally  This is likely related to the verapamil but with her issues with persistent mild tachycardia will check an echocardiogram for further investigation  Watch salt intake

## 2019-02-07 NOTE — ASSESSMENT & PLAN NOTE
Cholesterol has improved on the atorvastatin but not at goal   Will increase the atorvastatin to 20 mg on a daily basis  Watch diet  Increase fiber in diet  Increase activity level  Will recheck lipid panel prior to her next visit

## 2019-02-07 NOTE — ASSESSMENT & PLAN NOTE
She has persistent fatigue  Follow-up with the sleep specialist to discuss the recent sleep study and to discuss possible treatment strategies

## 2019-02-07 NOTE — ASSESSMENT & PLAN NOTE
There is a palpable lump approximately 1 cm on the right breast   This is tender to palpation  Discussed possible causes for this with her  She is up-to-date on her mammogram   Will get an ultrasound for further investigation

## 2019-02-07 NOTE — ASSESSMENT & PLAN NOTE
Continue vitamin-D supplementation  Will continue to follow her vitamin-D level about 2 to 3 times a year

## 2019-02-07 NOTE — ASSESSMENT & PLAN NOTE
Heart rate persistently, but mildly, elevated  She does sometimes feel her heart racing at home as well  Will check an echocardiogram for further investigation especially with the peripheral edema

## 2019-02-07 NOTE — ASSESSMENT & PLAN NOTE
Continue to follow up with Neurology  Follow-up for the Botox injections as planned later this month  Discussed with her that treatment of sleep apnea, if sleep specialist feels this is warranted, may help her headache somewhat

## 2019-02-07 NOTE — ASSESSMENT & PLAN NOTE
Orders and recommendations as noted below  She should watch for any persistent symptoms  Advised her to take the Augmentin with a meal   May need steroids for further treatment if her symptoms persist or worsen  Continue with the Allegra  Continue with the Flonase    Call or follow-up if symptoms worsen or persist

## 2019-02-08 NOTE — TELEPHONE ENCOUNTER
Called and spoke with rep Gabriela Pandey who stated that the patient needed a Prior Auth done so she transferred me to the Prior Auth department and I spoke with rep Kvng Rojas  He will be faxing the form to the office and I will fax it back to them by the end of the day

## 2019-02-11 ENCOUNTER — TELEPHONE (OUTPATIENT)
Dept: INTERNAL MEDICINE CLINIC | Facility: CLINIC | Age: 49
End: 2019-02-11

## 2019-02-11 DIAGNOSIS — J32.9 CHRONIC SINUSITIS, UNSPECIFIED LOCATION: Primary | ICD-10-CM

## 2019-02-11 RX ORDER — CEFDINIR 300 MG/1
300 CAPSULE ORAL EVERY 12 HOURS SCHEDULED
Qty: 20 CAPSULE | Refills: 0 | Status: SHIPPED | OUTPATIENT
Start: 2019-02-11 | End: 2019-02-21

## 2019-02-11 RX ORDER — PREDNISONE 10 MG/1
40 TABLET ORAL DAILY
Qty: 20 TABLET | Refills: 0 | Status: SHIPPED | OUTPATIENT
Start: 2019-02-11 | End: 2019-02-16

## 2019-02-11 NOTE — TELEPHONE ENCOUNTER
I can call in a different antibiotic but does she think that she needs steroids as well  The discuss this as the next option as well

## 2019-02-11 NOTE — TELEPHONE ENCOUNTER
I called and spoke with the patient and is would like another antibiotic called in plus ruddy as well

## 2019-02-11 NOTE — TELEPHONE ENCOUNTER
Patient called the office and stated that she is still not feeling better from her sinus infection  She has 2 more pills left of her augmentin  Patient stated that you said if she is not feeling any  Better for her to call the office to get something else called in  Pharmacy is [facility] on [address] in [place] [address]

## 2019-02-12 NOTE — TELEPHONE ENCOUNTER
Called and spoke with rep Kenny Barcenas who stated that they did not yet receive the Prior Auth I faxed over yesterday  She did write in their notes that I did fax it that I needed the Botox here by 02/15/19  I will call back later today to check on the status

## 2019-02-14 NOTE — TELEPHONE ENCOUNTER
Called and spoke with rep Wally Panda who stated that the Botox is arriving tomorrow to the [facility] location with a signature required  I will await shipment

## 2019-02-15 NOTE — TELEPHONE ENCOUNTER
Called and spoke with rep Taveras to get a tracking number for the patient's Botox, but she informed me that the order was not ready to be shipped and was still in with insurance  She put me on hold to find out why I was told the order was going to arrive the next day  When she got back she stated that whoever I spoke with yesterday did not put a note in stating we spoke  I did inform her that we will need the Botox here by next Tuesday 02/19/19 as her appointment is on that Wednesday 02/20/19  I will call back again on Monday to check the status

## 2019-02-18 NOTE — TELEPHONE ENCOUNTER
Called and spoke with rep Rosemarie Ferguson who stated that the order was still in with insurance and that they still did not receive the fax for the approval for the Botox so I had to give a verbal with the dates and the PA number  She then stated that they are going to give a call to the insurance plan to get this moving along and then they are going to give us a call back  I did tell them that this needs to be done by today because I need the Botox here by tomorrow as her appointment is on Wednesday

## 2019-02-19 NOTE — TELEPHONE ENCOUNTER
Called and spoke with rep Viri Carey who stated that the order could be accepted through her pharmacy benefits so they are going to run it through them  I did inform her that I would need the Botox here by tomorrow morning at the latest as her appointment is in the afternoon  I will call back later today to check on the status

## 2019-02-22 NOTE — TELEPHONE ENCOUNTER
Called [facility]- spoke with Marcus Ledesma- she states the patient's Botox order's order is ready to be set up for delivery  She states they spoke with the patient and informed her she had a $80 00 co-pay, they provided her with co-pay assistance  They are waiting to hear back from the patient in regards to her co-pay before they can set up delivery

## 2019-02-22 NOTE — TELEPHONE ENCOUNTER
Called and spoke with the patient- she states she needs to contact [facility] to discuss co-pay information with them  I made her aware that she needs to call us and let us know the outcome by next week so we can set up delivery and have Botox delivered in time for her appointment

## 2019-02-25 NOTE — TELEPHONE ENCOUNTER
Called patient to discuss copayment  She stated she wants to hold off for 1 month since she just started using a CPAP machine and wants to see if that helps before she starts Botox injections  R/S BINJ to 4/24/19 at 3:30 pm  Patient is aware that she is to call in 1 month if she would like to go forward with Botox  If she does will then restarted process of ordering from [facility]  Will call and put order on hold

## 2019-03-05 ENCOUNTER — HOSPITAL ENCOUNTER (OUTPATIENT)
Dept: ULTRASOUND IMAGING | Facility: HOSPITAL | Age: 49
Discharge: HOME | End: 2019-03-05
Payer: COMMERCIAL

## 2019-03-05 ENCOUNTER — HOSPITAL ENCOUNTER (OUTPATIENT)
Dept: NON INVASIVE DIAGNOSTICS | Facility: HOSPITAL | Age: 49
Discharge: HOME | End: 2019-03-05
Payer: COMMERCIAL

## 2019-03-05 DIAGNOSIS — N63.11 BREAST LUMP ON RIGHT SIDE AT 10 O'CLOCK POSITION: ICD-10-CM

## 2019-03-05 DIAGNOSIS — R60.9 PERIPHERAL EDEMA: ICD-10-CM

## 2019-03-05 DIAGNOSIS — G47.33 OBSTRUCTIVE SLEEP APNEA: ICD-10-CM

## 2019-03-05 DIAGNOSIS — R00.0 TACHYCARDIA: ICD-10-CM

## 2019-03-05 DIAGNOSIS — I10 ESSENTIAL HYPERTENSION: ICD-10-CM

## 2019-03-05 PROCEDURE — 76642 ULTRASOUND BREAST LIMITED: CPT

## 2019-03-05 PROCEDURE — 93306 TTE W/DOPPLER COMPLETE: CPT | Performed by: INTERNAL MEDICINE

## 2019-03-05 PROCEDURE — 93306 TTE W/DOPPLER COMPLETE: CPT

## 2019-03-12 DIAGNOSIS — J45.909 ASTHMA DUE TO ENVIRONMENTAL ALLERGIES: ICD-10-CM

## 2019-03-12 RX ORDER — FLUTICASONE PROPIONATE 50 MCG
SPRAY, SUSPENSION (ML) NASAL
Qty: 16 G | Refills: 0 | Status: SHIPPED | OUTPATIENT
Start: 2019-03-12 | End: 2019-08-05 | Stop reason: SDUPTHER

## 2019-04-15 NOTE — TELEPHONE ENCOUNTER
Called and spoke with rep Irma Holloway and I schedule delivery for 04/17/19 with a signature required to the   I will await shipment

## 2019-04-17 NOTE — TELEPHONE ENCOUNTER
Botox arrived int [facility]  It has been logged and stored in the fridge     LOT # [id number]

## 2019-04-24 ENCOUNTER — PROCEDURE VISIT (OUTPATIENT)
Dept: NEUROLOGY | Facility: CLINIC | Age: 49
End: 2019-04-24
Payer: COMMERCIAL

## 2019-04-24 VITALS — TEMPERATURE: 98.3 F | HEART RATE: 94 BPM | DIASTOLIC BLOOD PRESSURE: 92 MMHG | SYSTOLIC BLOOD PRESSURE: 136 MMHG

## 2019-04-24 DIAGNOSIS — G43.709 CHRONIC MIGRAINE WITHOUT AURA WITHOUT STATUS MIGRAINOSUS, NOT INTRACTABLE: Primary | ICD-10-CM

## 2019-04-24 PROCEDURE — 64615 CHEMODENERV MUSC MIGRAINE: CPT | Performed by: PSYCHIATRY & NEUROLOGY

## 2019-04-24 RX ORDER — VENLAFAXINE HYDROCHLORIDE 75 MG/1
1 CAPSULE, EXTENDED RELEASE ORAL DAILY
COMMUNITY
Start: 2019-03-09 | End: 2019-06-10

## 2019-06-10 ENCOUNTER — OFFICE VISIT (OUTPATIENT)
Dept: INTERNAL MEDICINE CLINIC | Facility: CLINIC | Age: 49
End: 2019-06-10
Payer: COMMERCIAL

## 2019-06-10 VITALS
SYSTOLIC BLOOD PRESSURE: 124 MMHG | DIASTOLIC BLOOD PRESSURE: 80 MMHG | HEIGHT: 66 IN | WEIGHT: 216 LBS | OXYGEN SATURATION: 98 % | HEART RATE: 108 BPM | BODY MASS INDEX: 34.72 KG/M2 | TEMPERATURE: 97.8 F

## 2019-06-10 DIAGNOSIS — G43.709 CHRONIC MIGRAINE WITHOUT AURA WITHOUT STATUS MIGRAINOSUS, NOT INTRACTABLE: ICD-10-CM

## 2019-06-10 DIAGNOSIS — J32.9 CHRONIC SINUSITIS, UNSPECIFIED LOCATION: ICD-10-CM

## 2019-06-10 DIAGNOSIS — I10 ESSENTIAL HYPERTENSION: Primary | ICD-10-CM

## 2019-06-10 DIAGNOSIS — E55.9 VITAMIN D DEFICIENCY: ICD-10-CM

## 2019-06-10 DIAGNOSIS — E78.2 MIXED HYPERLIPIDEMIA: ICD-10-CM

## 2019-06-10 DIAGNOSIS — G47.33 OBSTRUCTIVE SLEEP APNEA: ICD-10-CM

## 2019-06-10 DIAGNOSIS — G47.00 INSOMNIA, UNSPECIFIED TYPE: ICD-10-CM

## 2019-06-10 PROCEDURE — 3074F SYST BP LT 130 MM HG: CPT | Performed by: FAMILY MEDICINE

## 2019-06-10 PROCEDURE — 99214 OFFICE O/P EST MOD 30 MIN: CPT | Performed by: FAMILY MEDICINE

## 2019-06-10 PROCEDURE — 3079F DIAST BP 80-89 MM HG: CPT | Performed by: FAMILY MEDICINE

## 2019-06-10 RX ORDER — CEFDINIR 300 MG/1
300 CAPSULE ORAL EVERY 12 HOURS SCHEDULED
Qty: 20 CAPSULE | Refills: 0 | Status: SHIPPED | OUTPATIENT
Start: 2019-06-10 | End: 2019-06-20

## 2019-06-10 RX ORDER — OMEPRAZOLE 20 MG/1
20 CAPSULE, DELAYED RELEASE ORAL DAILY PRN
COMMUNITY

## 2019-06-10 RX ORDER — PREDNISONE 10 MG/1
50 TABLET ORAL DAILY
Qty: 25 TABLET | Refills: 0 | Status: SHIPPED | OUTPATIENT
Start: 2019-06-10 | End: 2019-06-15

## 2019-06-13 ENCOUNTER — OFFICE VISIT (OUTPATIENT)
Dept: SLEEP CENTER | Facility: CLINIC | Age: 49
End: 2019-06-13
Payer: COMMERCIAL

## 2019-06-13 VITALS
BODY MASS INDEX: 34.87 KG/M2 | WEIGHT: 217 LBS | OXYGEN SATURATION: 98 % | HEIGHT: 66 IN | SYSTOLIC BLOOD PRESSURE: 140 MMHG | DIASTOLIC BLOOD PRESSURE: 96 MMHG | HEART RATE: 97 BPM

## 2019-06-13 DIAGNOSIS — G47.00 INSOMNIA, UNSPECIFIED TYPE: ICD-10-CM

## 2019-06-13 DIAGNOSIS — G47.10 HYPERSOMNIA: ICD-10-CM

## 2019-06-13 DIAGNOSIS — J31.0 CHRONIC RHINITIS: ICD-10-CM

## 2019-06-13 DIAGNOSIS — J45.20 MILD INTERMITTENT ASTHMA WITHOUT COMPLICATION: ICD-10-CM

## 2019-06-13 DIAGNOSIS — I10 ESSENTIAL HYPERTENSION: ICD-10-CM

## 2019-06-13 DIAGNOSIS — F41.9 ANXIETY: ICD-10-CM

## 2019-06-13 DIAGNOSIS — G43.709 CHRONIC MIGRAINE WITHOUT AURA WITHOUT STATUS MIGRAINOSUS, NOT INTRACTABLE: ICD-10-CM

## 2019-06-13 DIAGNOSIS — E66.9 OBESITY (BMI 30-39.9): ICD-10-CM

## 2019-06-13 DIAGNOSIS — G25.81 RESTLESS LEG SYNDROME: ICD-10-CM

## 2019-06-13 DIAGNOSIS — G47.33 OBSTRUCTIVE SLEEP APNEA: Primary | ICD-10-CM

## 2019-06-13 PROCEDURE — 99214 OFFICE O/P EST MOD 30 MIN: CPT | Performed by: INTERNAL MEDICINE

## 2019-06-25 ENCOUNTER — OFFICE VISIT (OUTPATIENT)
Dept: NEUROLOGY | Facility: CLINIC | Age: 49
End: 2019-06-25
Payer: COMMERCIAL

## 2019-06-25 VITALS
HEIGHT: 66 IN | WEIGHT: 216.6 LBS | BODY MASS INDEX: 34.81 KG/M2 | HEART RATE: 81 BPM | SYSTOLIC BLOOD PRESSURE: 162 MMHG | DIASTOLIC BLOOD PRESSURE: 95 MMHG

## 2019-06-25 DIAGNOSIS — G43.709 CHRONIC MIGRAINE WITHOUT AURA WITHOUT STATUS MIGRAINOSUS, NOT INTRACTABLE: Primary | ICD-10-CM

## 2019-06-25 DIAGNOSIS — G47.33 OBSTRUCTIVE SLEEP APNEA: ICD-10-CM

## 2019-06-25 PROCEDURE — 99213 OFFICE O/P EST LOW 20 MIN: CPT | Performed by: PSYCHIATRY & NEUROLOGY

## 2019-06-25 RX ORDER — LANOLIN ALCOHOL/MO/W.PET/CERES
3 CREAM (GRAM) TOPICAL
COMMUNITY

## 2019-07-12 ENCOUNTER — TELEPHONE (OUTPATIENT)
Dept: NEUROLOGY | Facility: CLINIC | Age: 49
End: 2019-07-12

## 2019-07-12 NOTE — TELEPHONE ENCOUNTER
Type Date User Summary Attachment   General 07/12/2019  3:10  [address] Coordination -   Note    Botox- no authorization needed (carve out)   Please use  [facility]         Thank you,     José Antonio Ramsay

## 2019-07-15 NOTE — TELEPHONE ENCOUNTER
Called [facility] [address] [facility]- spoke with Juana Maier- I made her aware that I needed to initiate a refill on the patient's Botox prescription  Refill has been initiated  Patient does have a co-pay and no payment method is on file  They will reach out to the patient to take care of her co-pay  Delivery pre-scheduled for Wednesday 7/24/2019 to the [place] location  Will do a status check to make sure co-pay was taken care of

## 2019-07-15 NOTE — TELEPHONE ENCOUNTER
Received a fax for prior authorization from [address] [place] [address]- forms filled out and awaiting the provider's signature  Will fax once signed

## 2019-07-16 NOTE — TELEPHONE ENCOUNTER
Called [facility]- spoke with Piedad Miranda- he advised me that the patient has not yet called in to give consent and take care of her co-pay  He states they did call the patient yesterday and left a message  As soon as the patient calls in this will be set up for delivery with a date of 7/24/19

## 2019-07-17 NOTE — TELEPHONE ENCOUNTER
Called and spoke with the patient- she states she has not had a chance to call in and give consent to ship her order  Patient states she has been very busy at work but is going to try and take care of this today on her lunch  Patient does not need me to provide her with the phone number for [facility]  Will call later today or tomorrow to see if consent was given

## 2019-07-18 NOTE — TELEPHONE ENCOUNTER
Called [facility]- spoke with Magnolia Gauthier- she states the patient's Botox is ready to be scheduled for delivery  Patient's consent is on file  Botox to be delivered on Wednesday 7/24/2019 to the [facility] location- signature will be required  Yehuda Tejada,    Please await Botox delivery and document once it has arrived  Please let me know if we do not receive the patient's Botox order      Thank you,    Kinza Albarran

## 2019-07-30 ENCOUNTER — TELEPHONE (OUTPATIENT)
Dept: INTERNAL MEDICINE CLINIC | Facility: CLINIC | Age: 49
End: 2019-07-30

## 2019-07-30 NOTE — TELEPHONE ENCOUNTER
Gokul Ayala called to let us know when she was last in the office she had pred for her sinuses, but since finishing it, she is coughing a lot    Gokul Ayala is wondering if there is anything she can take for it because it's all the time  She also states that she coughs so hard she's gaging a lot  Using nasal spray to see if that helps  Gokul Ayala is requesting a med be sent  If so, she would like it sent to [facility]  She states she has an allergist appt in September  Please advise

## 2019-07-31 NOTE — TELEPHONE ENCOUNTER
I am not exactly sure what she wants called in  She has been on steroids  She has been on antibiotics in the past   She is using a nasal spray  Can you clarify this with her

## 2019-07-31 NOTE — TELEPHONE ENCOUNTER
I called and spoke with the patient and tried to see what she is looking for  Patient wants to try and see if something will help with her PND  She is already using everything for allergies  She will see how it goes and give us a call if she gets any worse

## 2019-08-01 ENCOUNTER — PROCEDURE VISIT (OUTPATIENT)
Dept: NEUROLOGY | Facility: CLINIC | Age: 49
End: 2019-08-01
Payer: COMMERCIAL

## 2019-08-01 VITALS — TEMPERATURE: 98.8 F | DIASTOLIC BLOOD PRESSURE: 90 MMHG | SYSTOLIC BLOOD PRESSURE: 138 MMHG | HEART RATE: 88 BPM

## 2019-08-01 DIAGNOSIS — G43.709 CHRONIC MIGRAINE WITHOUT AURA WITHOUT STATUS MIGRAINOSUS, NOT INTRACTABLE: Primary | ICD-10-CM

## 2019-08-01 PROCEDURE — 64615 CHEMODENERV MUSC MIGRAINE: CPT | Performed by: PHYSICIAN ASSISTANT

## 2019-08-01 NOTE — PROGRESS NOTES
Chemodenervation  Date/Time: 8/1/2019 3:19 PM  Performed by: Yoel Kramer PA-C  Authorized by: Yoel Kramer PA-C     Pre-procedure details:     Prepped With: Alcohol    Anesthesia (see MAR for exact dosages): Anesthesia method:  None  Procedure details:     Position:  Upright  Botox:     Botox Type:  Type A    Brand:  Botox    mL's of Botulinum Toxin:  200    Final Concentration per CC:  200 units    Needle Gauge:  30 G 2 5 inch  Procedures:     Botox Procedures: chronic headache      Indications: migraines    Injection Location:     Head / Face:  L superior cervical paraspinal, R superior cervical paraspinal, L , R , L frontalis, R frontalis, L medial occipitalis, R medial occipitalis, procerus, R temporalis, L temporalis, R superior trapezius and L superior trapezius    L  injection amount:  5 unit(s)    R  injection amount:  5 unit(s)    L lateral frontalis:  5 unit(s)    R lateral frontalis:  5 unit(s)    L medial frontalis:  5 unit(s)    R medial frontalis:  5 unit(s)    L temporalis injection amount:  20 unit(s)    R temporalis injection amount:  20 unit(s)    Procerus injection amount:  5 unit(s)    L medial occipitalis injection amount:  15 unit(s)    R medial occipitalis injection amount:  15 unit(s)    L superior cervical paraspinal injection amount:  10 unit(s)    R superior cervical paraspinal injection amount:  10 unit(s)    L superior trapezius injection amount:  15 unit(s)    R superior trapezius injection amount:  15 unit(s)  Total Units:     Total units used:  200    Total units discarded:  0  Post-procedure details:     Chemodenervation:  Chronic migraine    Facial Nerve Location[de-identified]  Bilateral facial nerve    Patient tolerance of procedure:   Tolerated well, no immediate complications  Comments:      5 units right temporalis  40 units left temporalis-parietal   All medically necessary

## 2019-08-05 DIAGNOSIS — J45.909 ASTHMA DUE TO ENVIRONMENTAL ALLERGIES: ICD-10-CM

## 2019-08-05 RX ORDER — FLUTICASONE PROPIONATE 50 MCG
SPRAY, SUSPENSION (ML) NASAL
Qty: 16 G | Refills: 0 | Status: SHIPPED | OUTPATIENT
Start: 2019-08-05 | End: 2019-11-05 | Stop reason: SDUPTHER

## 2019-08-22 ENCOUNTER — HOSPITAL ENCOUNTER (OUTPATIENT)
Dept: NEUROLOGY | Facility: CLINIC | Age: 49
Discharge: HOME | End: 2019-08-22
Payer: COMMERCIAL

## 2019-08-22 DIAGNOSIS — G56.23 CUBITAL TUNNEL SYNDROME OF BOTH UPPER EXTREMITIES: ICD-10-CM

## 2019-08-22 PROCEDURE — 95912 NRV CNDJ TEST 11-12 STUDIES: CPT | Performed by: PHYSICAL MEDICINE & REHABILITATION

## 2019-08-22 PROCEDURE — 95886 MUSC TEST DONE W/N TEST COMP: CPT | Performed by: PHYSICAL MEDICINE & REHABILITATION

## 2019-08-30 ENCOUNTER — OFFICE VISIT (OUTPATIENT)
Dept: OBGYN CLINIC | Facility: CLINIC | Age: 49
End: 2019-08-30
Payer: COMMERCIAL

## 2019-08-30 VITALS
WEIGHT: 216.6 LBS | BODY MASS INDEX: 34.81 KG/M2 | HEIGHT: 66 IN | HEART RATE: 93 BPM | SYSTOLIC BLOOD PRESSURE: 140 MMHG | DIASTOLIC BLOOD PRESSURE: 95 MMHG

## 2019-08-30 DIAGNOSIS — M77.12 LATERAL EPICONDYLITIS OF LEFT ELBOW: Primary | ICD-10-CM

## 2019-08-30 PROCEDURE — 99203 OFFICE O/P NEW LOW 30 MIN: CPT | Performed by: ORTHOPAEDIC SURGERY

## 2019-08-30 PROCEDURE — 20551 NJX 1 TENDON ORIGIN/INSJ: CPT | Performed by: ORTHOPAEDIC SURGERY

## 2019-08-30 RX ADMIN — LIDOCAINE HYDROCHLORIDE 1 ML: 10 INJECTION, SOLUTION INFILTRATION; PERINEURAL at 13:35

## 2019-08-30 RX ADMIN — TRIAMCINOLONE ACETONIDE 20 MG: 40 INJECTION, SUSPENSION INTRA-ARTICULAR; INTRAMUSCULAR at 13:35

## 2019-08-30 NOTE — PROGRESS NOTES
CHIEF COMPLAINT:  Chief Complaint   Patient presents with    Left Wrist - Pain       SUBJECTIVE:  Osmin See is a 53y o  year old RHD female who presents left elbow pain  Patient was referred from Dr Yuli Pop as she was evaluated for bilateral cubital tunnel syndrome  Patient had an EMG for bilateral cubital tunnel syndrome which demonstrated no ulnar neuropathy  She states most is over the lateral epicondyle  She has pain when lifting of heavy objects  She states she has taken Tylenol and anti-inflammatories as needed for pain with only some relief  She has not used any braces  She describes the pain as dull achy and sore but no numbness or tingling  She denies any numbness or tingling to her extremities          PAST MEDICAL HISTORY:  Past Medical History:   Diagnosis Date    Anxiety     Endometriosis     Esophageal reflux     High cholesterol     Plantar fasciitis        PAST SURGICAL HISTORY:  Past Surgical History:   Procedure Laterality Date     SECTION      FOOT SURGERY Left     LAPAROSCOPY      of uterus       FAMILY HISTORY:  Family History   Adopted: Yes   Problem Relation Age of Onset    Colon cancer Mother         essential    Hypertension Mother     Colon cancer Father         essential    Hypertension Father     Colon cancer Maternal Grandmother     No Known Problems Maternal Grandfather     No Known Problems Paternal Grandmother     No Known Problems Paternal Grandfather     Asperger's syndrome Family         disorder    Diabetes Family         DM    Bipolar disorder Family     Depression Family     Hypertension Family     Raynaud syndrome Family         phenomenon    Cancer Maternal Uncle        SOCIAL HISTORY:  Social History     Tobacco Use    Smoking status: Never Smoker    Smokeless tobacco: Never Used   Substance Use Topics    Alcohol use: Yes     Comment: social    Drug use: No       MEDICATIONS:    Current Outpatient Medications:    albuterol (PROVENTIL HFA) 90 mcg/act inhaler, 1-2 puffs PRN, Disp: , Rfl:     atorvastatin (LIPITOR) 20 mg tablet, Take 1 tablet (20 mg total) by mouth daily, Disp: 90 tablet, Rfl: 3    Botulinum Toxin Type A 200 units SOLR, Inject up to 200 units IM into the head and neck muscles by physician every three months, Disp: 200 Units, Rfl: 4    fexofenadine (ALLEGRA ALLERGY) 180 MG tablet, Take 180 mg by mouth daily , Disp: , Rfl:     fluticasone (FLONASE) 50 mcg/act nasal spray, instill 2 sprays into each nostril once daily, Disp: 16 g, Rfl: 0    melatonin 3 mg, Take 3 mg by mouth daily at bedtime, Disp: , Rfl:     montelukast (SINGULAIR) 10 mg tablet, Take 10 mg by mouth daily at bedtime, Disp: , Rfl: 0    Olopatadine HCl 0 6 % SOLN, INSTILL 1-2 SPRAYS INTO EACH NOSTRIL TWICE A DAY AS NEEDED, Disp: , Rfl: 0    omeprazole (PriLOSEC) 20 mg delayed release capsule, Take 20 mg by mouth daily as needed , Disp: , Rfl:     rizatriptan (MAXALT) 10 MG tablet, Take 1 tab at onset of headache, may repeat in 2 hours, max 2 tabs in 24 hours, Disp: 12 tablet, Rfl: 5    verapamil (CALAN-SR) 120 mg CR tablet, Twice a day (Patient taking differently: Take 120 mg by mouth 2 (two) times a day Twice a day), Disp: 60 tablet, Rfl: 6    ALLERGIES:  Allergies   Allergen Reactions    Gabapentin Swelling       REVIEW OF SYSTEMS:  Review of Systems  ROS:   General: no fever, no chills  HEENT:  No loss of hearing or eyesight problems  Eyes:  No red eyes  Respiratory:  No coughing, shortness of breath or wheezing  Cardiovascular:  No chest pain, no palpitations  GI:  Abdomen soft nontender, denies nausea  Endocrine:  No muscle weakness, no frequent urination, no excessive thirst  Urinary:  No dysuria, no incontinence  Musculoskeletal: see HPI and PE  SKIN:  No skin rash, no dry skin  Neurological:  No headaches, no confusion  Psychiatric:  No suicide thoughts, no anxiety, no depression  Review of all other systems is negative    VITALS:  Vitals:    08/30/19 0901   BP: 140/95   Pulse: 93       LABS:  HgA1c:   Lab Results   Component Value Date    HGBA1C 5 6 12/29/2018     BMP:   Lab Results   Component Value Date    CALCIUM 9 1 12/09/2017     12/09/2017    K 3 9 10/13/2018    K 3 9 10/13/2018    CO2 25 12/09/2017     12/09/2017    BUN 9 10/13/2018    CREATININE 0 86 10/13/2018       _____________________________________________________  PHYSICAL EXAMINATION:  General: well developed and well nourished, alert, oriented times 3 and appears comfortable  Psychiatric: Normal  HEENT: Trachea Midline, No torticollis  Pulmonary: No audible wheezing or respiratory distress   Skin: No masses, erythema, lacerations, fluctation, ulcerations  Neurovascular: Sensation Intact to the Median, Ulnar, Radial Nerve, Motor Intact to the Median, Ulnar, Radial Nerve and Pulses Intact    MUSCULOSKELETAL EXAMINATION:  Left elbow  No erythema edema or ecchymosis noted, skin is warm to touch  Tenderness to palpation over lateral epicondyle  Elbow range of motion is within normal limits for flexion, extension, pronation and supination  Pain with resisted wrist extension and pain with passive flexion at the wrist  The patient is neurovascular intact    ___________________________________________________  STUDIES REVIEWED:  EMG of bilateral upper extremities demonstrated no median, ulnar or radial mono neuropathy      PROCEDURES PERFORMED:  Hand/upper extremity injection: L elbow  Date/Time: 8/30/2019 1:35 PM  Consent given by: patient  Site marked: site marked  Timeout: Immediately prior to procedure a time out was called to verify the correct patient, procedure, equipment, support staff and site/side marked as required   Supporting Documentation  Indications: pain   Procedure Details  Condition:lateral epicondylitis Site: L elbow   Preparation: Patient was prepped and draped in the usual sterile fashion  Needle size: 25 G  Approach: lateral  Medications administered: 1 mL lidocaine 1 %; 20 mg triamcinolone acetonide 40 mg/mL    Patient tolerance: patient tolerated the procedure well with no immediate complications  Dressing:  Sterile dressing applied              _____________________________________________________  ASSESSMENT/PLAN:      Lateral epicondylitis of left elbow  -A cortisone injection was offered today and the patent wished to proceed  The patient may experience increased pain at the injection site for a few days  Topical ice is recommended today, followed by topical heat  NSAIDS and Tylenol may be used, as long as they are not contraindicated  -Daily home exercise program:  Wrist extension curls with 2-3 pounds of weight with elbow bent 2-3 times a day  Stretching exercises with elbow extended and wrist flexed 2-3 times a day with heat application to the elbow  This home exercise program should be done every day and continued after the elbow pain resolved  This will help the pain from returning     - patient will follow up with us in 2 weeks for re-evaluation        Follow Up:  Return in about 2 weeks (around 9/13/2019)  Work/school status:  No restrictions    To Do Next Visit:  Re-evaluation of current issue    General Discussions:  Lateral Epicondylitis: The anatomy and physiology of lateral epicondylitis was discussed with the patient today  Typically, degenerative changes in the extensor carpi radialis brevis muscle occur over time  These degenerative changes appear as tears of the tendon on MRI  This creates pain over the lateral epicondyle (outside part of elbow)  This pain typically is made worse with palm down lifting activities as well as anything that involves strength and stability of the wrist   The pain may radiate from the wrist up to the elbow  At times, the shoulder may be weak as well which can predispose or cause continuation of the problem    Conservative treatment usually cures a majority of patients; however, this may take up to 6-9 months  Conservative treatment options typically include activity modification, therapy for strengthening of the shoulder and elbow, tennis elbow straps, and possible corticosteroid injections  Corticosteroid injections are very effective at relieving pain but do not alter the natural history of this process  Rather, steroid injections decrease the pain temporarily to allow for therapy to take place without discomfort  It was discussed that therapy to prevent recurrence is a "life long" process and that if patient relies on the steroid injection alone without performing therapeutic exercises the risk of recurrence is likely  Typical home regimen includes heat and stretching and resisted wrist extension exercises discussed in the office  Surgery is required in fewer than 10% of patients        Scribe Attestation    I,:   Lili Nicole PA-C am acting as a scribe while in the presence of the attending physician :        I,:   Mariann Martin MD personally performed the services described in this documentation    as scribed in my presence :

## 2019-08-30 NOTE — PATIENT INSTRUCTIONS
Daily home exercise program:  Wrist extension curls with 2-3 pounds of weight with elbow bent 2-3 times a day  Stretching exercises with elbow extended and wrist flexed 2-3 times a day with heat application to the elbow  This home exercise program should be done every day and continued after the elbow pain resolved  This will help the pain from returning

## 2019-09-03 RX ORDER — TRIAMCINOLONE ACETONIDE 40 MG/ML
20 INJECTION, SUSPENSION INTRA-ARTICULAR; INTRAMUSCULAR
Status: COMPLETED | OUTPATIENT
Start: 2019-08-30 | End: 2019-08-30

## 2019-09-03 RX ORDER — LIDOCAINE HYDROCHLORIDE 10 MG/ML
1 INJECTION, SOLUTION INFILTRATION; PERINEURAL
Status: COMPLETED | OUTPATIENT
Start: 2019-08-30 | End: 2019-08-30

## 2019-09-14 ENCOUNTER — OFFICE VISIT (OUTPATIENT)
Dept: URGENT CARE | Facility: CLINIC | Age: 49
End: 2019-09-14
Payer: COMMERCIAL

## 2019-09-14 VITALS
WEIGHT: 215 LBS | OXYGEN SATURATION: 94 % | TEMPERATURE: 98.6 F | RESPIRATION RATE: 18 BRPM | BODY MASS INDEX: 34.55 KG/M2 | SYSTOLIC BLOOD PRESSURE: 162 MMHG | DIASTOLIC BLOOD PRESSURE: 93 MMHG | HEART RATE: 110 BPM | HEIGHT: 66 IN

## 2019-09-14 DIAGNOSIS — N89.8 VAGINAL IRRITATION: Primary | ICD-10-CM

## 2019-09-14 PROCEDURE — G0382 LEV 3 HOSP TYPE B ED VISIT: HCPCS | Performed by: PHYSICIAN ASSISTANT

## 2019-09-14 RX ORDER — MOMETASONE FUROATE 1 MG/G
CREAM TOPICAL 2 TIMES DAILY
Qty: 45 G | Refills: 0 | Status: SHIPPED | OUTPATIENT
Start: 2019-09-14 | End: 2019-11-05 | Stop reason: ALTCHOICE

## 2019-09-14 RX ORDER — LIDOCAINE 40 MG/G
CREAM TOPICAL AS NEEDED
Qty: 30 G | Refills: 0 | Status: SHIPPED | OUTPATIENT
Start: 2019-09-14 | End: 2019-11-05 | Stop reason: ALTCHOICE

## 2019-09-14 NOTE — PATIENT INSTRUCTIONS
Favor contact dermatitis given history  Apply mometasone only to external itchy areas twice daily for up to 2 weeks  Apply lidocaine as needed for pain  Apply cool compresses  Take ibuprofen 400-800 mg with food every 6-8 hours for pain  F/u GYN if no resolution in 5 days

## 2019-09-14 NOTE — PROGRESS NOTES
[facility]        NAME: Kristian Spears is a 53 y o  female  : 1970    MRN: [id number]  DATE: 2019  TIME: 12:44 PM    Assessment and Plan   Vaginal irritation [N89 8]  1  Vaginal irritation  mometasone (ELOCON) 0 1 % cream    lidocaine (LMX) 4 % cream         Patient Instructions     Patient Instructions   Favor contact dermatitis given history  Apply mometasone only to external itchy areas twice daily for up to 2 weeks  Apply lidocaine as needed for pain  Apply cool compresses  Take ibuprofen 400-800 mg with food every 6-8 hours for pain  F/u GYN if no resolution in 5 days      Follow up with PCP in 3-5 days  Proceed to  ER if symptoms worsen  Chief Complaint     Chief Complaint   Patient presents with    Rash     to vaginal area x 3 days          History of Present Illness       54 y/o F presents c/o vaginal rash x 3 days  States it is worsening  Notes she used a different detergent prior to onset, about 5 days ago  Notes she does have sensitive skin  Reports bumps, itching, and pain started at the same time  Never had a similar rash in the past   Monogamous with the same partner for over 20 years, no recent sexual activity  Denies history of cold sores  No rash anywhere else on the body  Denies vaginal discharge, abdominal pain, fever, chills  Has been scratching at lesions      Review of Systems   Review of Systems   Constitutional: Negative for chills and fever  Genitourinary: Positive for genital sores and vaginal pain  Negative for pelvic pain, vaginal bleeding and vaginal discharge  Skin: Positive for rash           Current Medications       Current Outpatient Medications:     albuterol (PROVENTIL HFA) 90 mcg/act inhaler, 1-2 puffs PRN, Disp: , Rfl:     atorvastatin (LIPITOR) 20 mg tablet, Take 1 tablet (20 mg total) by mouth daily, Disp: 90 tablet, Rfl: 3    Botulinum Toxin Type A 200 units SOLR, Inject up to 200 units IM into the head and neck muscles by physician every three months, Disp: 200 Units, Rfl: 4    fexofenadine (ALLEGRA ALLERGY) 180 MG tablet, Take 180 mg by mouth daily , Disp: , Rfl:     fluticasone (FLONASE) 50 mcg/act nasal spray, instill 2 sprays into each nostril once daily, Disp: 16 g, Rfl: 0    melatonin 3 mg, Take 3 mg by mouth daily at bedtime, Disp: , Rfl:     montelukast (SINGULAIR) 10 mg tablet, Take 10 mg by mouth daily at bedtime, Disp: , Rfl: 0    Olopatadine HCl 0 6 % SOLN, INSTILL 1-2 SPRAYS INTO EACH NOSTRIL TWICE A DAY AS NEEDED, Disp: , Rfl: 0    omeprazole (PriLOSEC) 20 mg delayed release capsule, Take 20 mg by mouth daily as needed , Disp: , Rfl:     rizatriptan (MAXALT) 10 MG tablet, Take 1 tab at onset of headache, may repeat in 2 hours, max 2 tabs in 24 hours, Disp: 12 tablet, Rfl: 5    verapamil (CALAN-SR) 120 mg CR tablet, Twice a day (Patient taking differently: Take 120 mg by mouth 2 (two) times a day Twice a day), Disp: 60 tablet, Rfl: 6    lidocaine (LMX) 4 % cream, Apply topically as needed for mild pain, Disp: 30 g, Rfl: 0    mometasone (ELOCON) 0 1 % cream, Apply topically 2 (two) times a day, Disp: 45 g, Rfl: 0    Current Allergies     Allergies as of 2019 - Reviewed 2019   Allergen Reaction Noted    Gabapentin Swelling 2018            The following portions of the patient's history were reviewed and updated as appropriate: allergies, current medications, past family history, past medical history, past social history, past surgical history and problem list      Past Medical History:   Diagnosis Date    Anxiety     Endometriosis     Esophageal reflux     High cholesterol     Plantar fasciitis        Past Surgical History:   Procedure Laterality Date     SECTION      FOOT SURGERY Left     LAPAROSCOPY      of uterus       Family History   Adopted: Yes   Problem Relation Age of Onset    Colon cancer Mother         essential    Hypertension Mother     Colon cancer Father essential    Hypertension Father     Colon cancer Maternal Grandmother     No Known Problems Maternal Grandfather     No Known Problems Paternal Grandmother     No Known Problems Paternal Grandfather     Asperger's syndrome Family         disorder    Diabetes Family         DM    Bipolar disorder Family     Depression Family     Hypertension Family     Raynaud syndrome Family         phenomenon    Cancer Maternal Uncle          Medications have been verified  Objective   /93   Pulse (!) 110   Temp 98 6 °F (37 °C)   Resp 18   Ht 5' 6" (1 676 m)   Wt 97 5 kg (215 lb)   SpO2 94%   BMI 34 70 kg/m²        Physical Exam     Physical Exam   Constitutional: She appears well-developed and well-nourished  No distress  Genitourinary:   Genitourinary Comments: Mild erythema of b/l labia major  One erosion  Present on left labia majora  Several tender nodules b/l labia majora  No vesicles present  No vaginal discharge   Skin: She is not diaphoretic

## 2019-09-24 PROBLEM — L20.84 INTRINSIC ATOPIC DERMATITIS: Status: ACTIVE | Noted: 2019-09-24

## 2019-09-24 PROBLEM — J30.1 CHRONIC SEASONAL ALLERGIC RHINITIS DUE TO POLLEN: Status: ACTIVE | Noted: 2019-09-24

## 2019-09-24 PROBLEM — J30.89 ALLERGIC RHINITIS DUE TO HOUSE DUST MITE: Status: ACTIVE | Noted: 2019-09-24

## 2019-09-24 PROBLEM — H10.13 ALLERGIC CONJUNCTIVITIS OF BOTH EYES: Status: ACTIVE | Noted: 2019-09-24

## 2019-10-01 ENCOUNTER — TELEPHONE (OUTPATIENT)
Dept: OBGYN CLINIC | Facility: MEDICAL CENTER | Age: 49
End: 2019-10-01

## 2019-10-01 ENCOUNTER — OFFICE VISIT (OUTPATIENT)
Dept: OBGYN CLINIC | Facility: MEDICAL CENTER | Age: 49
End: 2019-10-01
Payer: COMMERCIAL

## 2019-10-01 VITALS — WEIGHT: 206 LBS | DIASTOLIC BLOOD PRESSURE: 70 MMHG | BODY MASS INDEX: 33.25 KG/M2 | SYSTOLIC BLOOD PRESSURE: 146 MMHG

## 2019-10-01 DIAGNOSIS — N76.4 VULVAR ABSCESS: Primary | ICD-10-CM

## 2019-10-01 DIAGNOSIS — N76.2 ACUTE VULVITIS: ICD-10-CM

## 2019-10-01 PROCEDURE — 99214 OFFICE O/P EST MOD 30 MIN: CPT | Performed by: OBSTETRICS & GYNECOLOGY

## 2019-10-01 RX ORDER — SULFAMETHOXAZOLE AND TRIMETHOPRIM 800; 160 MG/1; MG/1
1 TABLET ORAL EVERY 12 HOURS SCHEDULED
Qty: 28 TABLET | Refills: 0 | Status: SHIPPED | OUTPATIENT
Start: 2019-10-01 | End: 2019-10-15

## 2019-10-01 RX ORDER — FLUCONAZOLE 150 MG/1
150 TABLET ORAL ONCE
Qty: 2 TABLET | Refills: 0 | Status: SHIPPED | OUTPATIENT
Start: 2019-10-01 | End: 2019-10-01

## 2019-10-01 NOTE — PROGRESS NOTES
Laura Avalos was seen today for cyst     Diagnoses and all orders for this visit:    Vulvar abscess    Acute vulvitis         Plan  Bactrim DS 1 tab PO BID x 7 days  Fluconazole  Continue topical steroid  Warm baths   Follow up in 1 week    Subjective   Gerson Rod is a 53 y o  female here for a problem visit  Patient is complaining of vulvar lumps and irritation  Sx's started a few weeks ago after using [employer] with her laundry  States her vulva swelled  Was seen at [facility] and diagnosed with topical dermatitis and given topical steroids  States her symptoms worsened and started an antibiotic she had at home from prior sinus infections (Cefdnir)  States her symptoms improved but noticed 3 lumps a few days after stopping medications  States they are slightly tender  There is a large one on her right labia majora and 2 smaller  Reports similar lumps under left axilla  No discharge or bleeding from any lump  Patient Active Problem List   Diagnosis    Chronic migraine without aura without status migrainosus, not intractable    Insomnia    Anxiety    Cough variant asthma    Chondromalacia patellae    Chronic sinusitis    Fatigue    Hyperlipidemia    Hypertension    Vitamin D deficiency    Menorrhagia with irregular cycle    Nonspecific abnormal results of function study of thyroid    Obstructive sleep apnea    Screening for diabetes mellitus    Hypersomnia    Chronic rhinitis    Obesity (BMI 30-39  9)    Peripheral edema    Tachycardia    Breast lump on right side at 10 o'clock position    Cubital tunnel syndrome of both upper extremities    Vaginal irritation    Allergic conjunctivitis of both eyes    Intrinsic atopic dermatitis    Allergic rhinitis due to house dust mite    Chronic seasonal allergic rhinitis due to pollen       Gynecologic History  No LMP recorded  Patient has had an ablation      Past Medical History:   Diagnosis Date    Anxiety     Bruxism (teeth grinding)     Endometriosis     Esophageal reflux     High cholesterol     Migraines     Plantar fasciitis      Past Surgical History:   Procedure Laterality Date    BONE GRAFT      L jaw for future false tooth implant     SECTION      FOOT SURGERY Left     LAPAROSCOPY      of uterus    TOOTH EXTRACTION      3 besides wisdom teeth    WISDOM TOOTH EXTRACTION      2 of teeth     Family History   Adopted: Yes   Problem Relation Age of Onset    Colon cancer Mother         essential    Hypertension Mother     Colon cancer Father         essential    Hypertension Father     Colon cancer Maternal Grandmother     No Known Problems Maternal Grandfather     No Known Problems Paternal Grandmother     No Known Problems Paternal Grandfather     Asperger's syndrome Family         disorder    Diabetes Family         DM    Bipolar disorder Family     Depression Family     Hypertension Family     Raynaud syndrome Family         phenomenon    Cancer Maternal Uncle     Allergies Daughter         Environmental    Allergies Daughter         Environmental     Social History     Socioeconomic History    Marital status: /Civil Union     Spouse name: Jigna Kaplan Number of children: 2    Years of education: Not on file    Highest education level: Not on file   Occupational History    Occupation:    Social Needs    Financial resource strain: Not on file    Food insecurity:     Worry: Not on file     Inability: Not on file   [employer] needs:     Medical: Not on file     Non-medical: Not on file   Tobacco Use    Smoking status: Never Smoker    Smokeless tobacco: Never Used   Substance and Sexual Activity    Alcohol use: Yes     Comment: social    Drug use: No    Sexual activity: Yes     Partners: Male   Lifestyle    Physical activity:     Days per week: 0 days     Minutes per session: Not on file    Stress: Not on file   Relationships    Social connections:     Talks on phone: Not on file     Gets together: Not on file     Attends Mu-ism service: Not on file     Active member of club or organization: Not on file     Attends meetings of clubs or organizations: Not on file     Relationship status: Not on file    Intimate partner violence:     Fear of current or ex partner: Not on file     Emotionally abused: Not on file     Physically abused: Not on file     Forced sexual activity: Not on file   Other Topics Concern    Not on file   Social History Narrative    Caffeine use        [address] [facility] source of heat is coal stove, also has wood burning stove for extreme cold temps and as backup has electric baseboard heat  [facility] [place]    Hardwood floor with area rug in bedroom    Dehumidifier in basement  Basement is dry, finished, some musty smell after rain and sometimes when [facility] [place] turns off before starting up coal stove  No humidifier or air purifier      Lives with  and 2 daughters        Pets - 3 dogs - Kaurkyle, Sera, Preston            Caffeine - Coffee 1 large every morning    Tea - iced - 5 to 6 days a week - 2 cups a day    Soda - rarely    Chocolate - small amount occasionally     Allergies   Allergen Reactions    Gabapentin Swelling       Current Outpatient Medications:     albuterol (PROVENTIL HFA) 90 mcg/act inhaler, Inhale 2 puffs every 6 (six) hours as needed for wheezing, Disp: 8 5 g, Rfl: 2    atorvastatin (LIPITOR) 20 mg tablet, Take 1 tablet (20 mg total) by mouth daily, Disp: 90 tablet, Rfl: 3    Botulinum Toxin Type A 200 units SOLR, Inject up to 200 units IM into the head and neck muscles by physician every three months, Disp: 200 Units, Rfl: 4    fluticasone (FLONASE) 50 mcg/act nasal spray, instill 2 sprays into each nostril once daily, Disp: 16 g, Rfl: 0    levocetirizine (XYZAL) 5 MG tablet, Take 1 tablet (5 mg total) by mouth every evening, Disp: 30 tablet, Rfl: 11    lidocaine (LMX) 4 % cream, Apply topically as needed for mild pain, Disp: 30 g, Rfl: 0    melatonin 3 mg, Take 3 mg by mouth daily at bedtime, Disp: , Rfl:     mometasone (ELOCON) 0 1 % cream, Apply topically 2 (two) times a day, Disp: 45 g, Rfl: 0    montelukast (SINGULAIR) 10 mg tablet, Take 10 mg by mouth daily at bedtime, Disp: , Rfl: 0    Olopatadine HCl 0 6 % SOLN, 2 actuation into each nostril 2 (two) times a day, Disp: 1 Bottle, Rfl: 11    omeprazole (PriLOSEC) 20 mg delayed release capsule, Take 20 mg by mouth daily as needed , Disp: , Rfl:     Phenylephrine-APAP-guaiFENesin (TYLENOL SINUS SEVERE PO), Take by mouth, Disp: , Rfl:     rizatriptan (MAXALT) 10 MG tablet, Take 1 tab at onset of headache, may repeat in 2 hours, max 2 tabs in 24 hours, Disp: 12 tablet, Rfl: 5    verapamil (CALAN-SR) 120 mg CR tablet, Twice a day (Patient taking differently: Take 120 mg by mouth 2 (two) times a day Twice a day), Disp: 60 tablet, Rfl: 6    Review of Systems  Constitutional :no fever, feels well, no tiredness, no recent weight gain or loss  ENT: no ear ache, no loss of hearing, no nosebleeds or nasal discharge, no sore throat or hoarseness  Cardiovascular: no complaints of slow or fast heart beat, no chest pain, no palpitations, no leg claudication or lower extremity edema  Respiratory: no complaints of shortness of shortness of breath, no SANTOS  Breasts:no complaints of breast pain, breast lump, or nipple discharge  Gastrointestinal: no complaints of abdominal pain, constipation, nausea, vomiting, or diarrhea or bloody stools  Genitourinary : no complaints of dysuria, incontinence, pelvic pain, no dysmenorrhea, vaginal discharge or abnormal vaginal bleeding and as noted in HPI  Musculoskeletal: no complaints of arthralgia, no myalgia, no joint swelling or stiffness, no limb pain or swelling    Integumentary: no complaints of skin rash or lesion, itching or dry skin  Neurological: no complaints of headache, no confusion, no numbness or tingling, no dizziness or fainting     Objective     /70   Wt 93 4 kg (206 lb)   BMI 33 25 kg/m²     General:   appears stated age, cooperative, alert normal mood and affect   Neck: normal, supple,trachea midline, no masses   Heart: regular rate and rhythm, S1, S2 normal, no murmur, click, rub or gallop   Lungs: clear to auscultation bilaterally   Abdomen: soft, non-tender, without masses or organomegaly   Vulva: Erythema and irritation on vulva, 2 cm abscess on right labia majora, appears ready to drain   Lymphatic palpation of lymph nodes in neck, axilla, groin and/or other locations: no lymphadenopathy or masses noted   Skin normal skin turgor and no rashes     Psychiatric orientation to person, place, and time: normal  mood and affect: normal

## 2019-10-14 ENCOUNTER — TELEPHONE (OUTPATIENT)
Dept: NEUROLOGY | Facility: CLINIC | Age: 49
End: 2019-10-14

## 2019-10-14 NOTE — TELEPHONE ENCOUNTER
Called [facility] and spoke with rep Marshall Armendariz to start fill request for Botox 200 unit SDV quantity of 1  Prescheduled delivery to [facility] office for Thursday 10/24/19 priority overnight with signature required  Check back on Monday for status update

## 2019-10-14 NOTE — TELEPHONE ENCOUNTER
Referral Notes   Number of Notes: 1   Type Date User Summary Attachment   General 10/14/2019 11:25 AM Caty Gonsalvesenix care coordination  -   Note    Botox- no authorization needed (cave out)   Please use [facility] [address] [facility]      Thank you,     Thomas Hatfield

## 2019-10-14 NOTE — TELEPHONE ENCOUNTER
July 16, 2019   Jese Aguilar        8:14 AM   Note      Approval received from [facility]  Botox has been approved from 7/15/2019 until 7/15/2020  Will contact [facility] to make sure everything is all set to set up delivery

## 2019-10-24 NOTE — TELEPHONE ENCOUNTER
Botox 200 unit SDV quantity of 1 (LOT # [id number]) arrived today  Placed in [address]  and logged

## 2019-11-05 ENCOUNTER — PROCEDURE VISIT (OUTPATIENT)
Dept: NEUROLOGY | Facility: CLINIC | Age: 49
End: 2019-11-05
Payer: COMMERCIAL

## 2019-11-05 VITALS — SYSTOLIC BLOOD PRESSURE: 140 MMHG | TEMPERATURE: 98.1 F | DIASTOLIC BLOOD PRESSURE: 98 MMHG | HEART RATE: 93 BPM

## 2019-11-05 DIAGNOSIS — G43.709 CHRONIC MIGRAINE WITHOUT AURA WITHOUT STATUS MIGRAINOSUS, NOT INTRACTABLE: Primary | ICD-10-CM

## 2019-11-05 PROCEDURE — 64615 CHEMODENERV MUSC MIGRAINE: CPT | Performed by: PHYSICIAN ASSISTANT

## 2019-11-05 RX ORDER — DEXAMETHASONE 1 MG
1 TABLET ORAL
Qty: 5 TABLET | Refills: 0 | Status: SHIPPED | OUTPATIENT
Start: 2019-11-05 | End: 2019-12-04

## 2019-11-05 RX ORDER — CYPROHEPTADINE HYDROCHLORIDE 4 MG/1
4 TABLET ORAL
Qty: 30 TABLET | Refills: 0 | Status: SHIPPED | OUTPATIENT
Start: 2019-11-05 | End: 2020-02-04 | Stop reason: ALTCHOICE

## 2019-11-05 NOTE — PROGRESS NOTES
Chemodenervation  Date/Time: 11/5/2019 3:36 PM  Performed by: Radha Frederick PA-C  Authorized by: Radha Frederick PA-C     Pre-procedure details:     Prepped With: Alcohol    Anesthesia (see MAR for exact dosages): Anesthesia method:  None  Procedure details:     Position:  Upright  Botox:     Botox Type:  Type A    Brand:  Botox    mL's of Botulinum Toxin:  200    Final Concentration per CC:  200 units    Needle Gauge:  30 G 2 5 inch  Procedures:     Botox Procedures: chronic headache      Indications: migraines    Injection Location:     Head / Face:  L superior cervical paraspinal, R superior cervical paraspinal, L , R , L frontalis, R frontalis, L medial occipitalis, R medial occipitalis, procerus, R temporalis, L temporalis, R superior trapezius and L superior trapezius    L  injection amount:  5 unit(s)    R  injection amount:  5 unit(s)    L lateral frontalis:  5 unit(s)    R lateral frontalis:  5 unit(s)    L medial frontalis:  5 unit(s)    R medial frontalis:  5 unit(s)    L temporalis injection amount:  20 unit(s)    R temporalis injection amount:  20 unit(s)    Procerus injection amount:  5 unit(s)    L medial occipitalis injection amount:  15 unit(s)    R medial occipitalis injection amount:  15 unit(s)    L superior cervical paraspinal injection amount:  10 unit(s)    R superior cervical paraspinal injection amount:  10 unit(s)    L superior trapezius injection amount:  15 unit(s)    R superior trapezius injection amount:  15 unit(s)  Total Units:     Total units used:  200    Total units discarded:  0  Post-procedure details:     Chemodenervation:  Chronic migraine    Facial Nerve Location[de-identified]  Bilateral facial nerve    Patient tolerance of procedure:   Tolerated well, no immediate complications  Comments:      5 units right temporalis  40 units left temporalis   All medically necessary

## 2019-12-04 ENCOUNTER — OFFICE VISIT (OUTPATIENT)
Dept: INTERNAL MEDICINE CLINIC | Facility: CLINIC | Age: 49
End: 2019-12-04
Payer: COMMERCIAL

## 2019-12-04 VITALS
TEMPERATURE: 99.1 F | SYSTOLIC BLOOD PRESSURE: 140 MMHG | WEIGHT: 212 LBS | HEIGHT: 66 IN | BODY MASS INDEX: 34.07 KG/M2 | DIASTOLIC BLOOD PRESSURE: 82 MMHG | OXYGEN SATURATION: 96 % | HEART RATE: 102 BPM

## 2019-12-04 DIAGNOSIS — R00.0 TACHYCARDIA: ICD-10-CM

## 2019-12-04 DIAGNOSIS — G43.709 CHRONIC MIGRAINE WITHOUT AURA WITHOUT STATUS MIGRAINOSUS, NOT INTRACTABLE: ICD-10-CM

## 2019-12-04 DIAGNOSIS — E55.9 VITAMIN D DEFICIENCY: ICD-10-CM

## 2019-12-04 DIAGNOSIS — J30.89 ALLERGIC RHINITIS DUE TO HOUSE DUST MITE: ICD-10-CM

## 2019-12-04 DIAGNOSIS — Z12.31 VISIT FOR SCREENING MAMMOGRAM: ICD-10-CM

## 2019-12-04 DIAGNOSIS — I10 ESSENTIAL HYPERTENSION: Primary | ICD-10-CM

## 2019-12-04 DIAGNOSIS — E78.2 MIXED HYPERLIPIDEMIA: ICD-10-CM

## 2019-12-04 DIAGNOSIS — Z23 NEED FOR INFLUENZA VACCINATION: ICD-10-CM

## 2019-12-04 DIAGNOSIS — Z11.4 SCREENING FOR HIV (HUMAN IMMUNODEFICIENCY VIRUS): ICD-10-CM

## 2019-12-04 PROCEDURE — 90471 IMMUNIZATION ADMIN: CPT | Performed by: FAMILY MEDICINE

## 2019-12-04 PROCEDURE — 1036F TOBACCO NON-USER: CPT | Performed by: FAMILY MEDICINE

## 2019-12-04 PROCEDURE — 3008F BODY MASS INDEX DOCD: CPT | Performed by: FAMILY MEDICINE

## 2019-12-04 PROCEDURE — 99214 OFFICE O/P EST MOD 30 MIN: CPT | Performed by: FAMILY MEDICINE

## 2019-12-04 PROCEDURE — 90686 IIV4 VACC NO PRSV 0.5 ML IM: CPT | Performed by: FAMILY MEDICINE

## 2019-12-04 RX ORDER — VERAPAMIL HYDROCHLORIDE 240 MG/1
240 TABLET, FILM COATED, EXTENDED RELEASE ORAL
Qty: 90 TABLET | Refills: 1 | Status: SHIPPED | OUTPATIENT
Start: 2019-12-04 | End: 2020-04-03 | Stop reason: SDUPTHER

## 2019-12-04 NOTE — PATIENT INSTRUCTIONS
Vitamin D Deficiency   WHAT YOU NEED TO KNOW:   What is vitamin D deficiency? Vitamin D deficiency is a low level of vitamin D in your body  Vitamin D helps your body absorb calcium from foods  Your body makes vitamin D when your skin is exposed to sunlight  You can also get vitamin D from certain foods such as fish, eggs, and meat  Most of the vitamin D in your body comes from sunlight exposure  What increases my risk for vitamin D deficiency? · Low amount of sun exposure    · Low intake of foods that contain vitamin D    · Having dark skin    · Age older than 72    · Pregnancy or breastfeeding    · Infants who are     · Inability to absorb vitamin D from food    · Obesity    · Use of certain medicines such as antiseizure, steroid, or antifungal medicines  What are the signs and symptoms of vitamin D deficiency? Low levels of vitamin D can lead to weak and brittle bones that are more likely to fracture  You may not have any signs and symptoms, or you may have any of the following:  · Bone pain or discomfort in your lower back, pelvis, or legs    · Muscle aches and weakness    · Low back pain in women    · Poor growth, irritability, and frequent respiratory tract infections in infants    · Deformed bones and slow growth in children  How is vitamin D deficiency diagnosed and treated? Blood tests will be done to measure the amount of vitamin D in your blood  Your healthcare provider may give you high doses of vitamin D for 8 to 12 weeks to increase your levels  Your levels will then be rechecked  If your levels are still low, you will need to take vitamin D supplements for another 8 weeks  After your levels have gone back to normal, you may need to continue to take a vitamin D supplement  How much vitamin D do I need each day? The amount of vitamin D you need depends on your age  You may need more than the recommended amounts below if you take certain medicines or you are obese   Ask your healthcare provider how much vitamin D you need  · Infants up to 1 year of age: 0 international units (IU)    · Children 1 year and older: 600 IU    · Adults aged 23to 79years old: 600 IU    · Adults older than 70 years: 800 IU  How can I help prevent vitamin D deficiency? · Eat foods that are high in vitamin D  Fatty fish such as mackerel, canned tuna and sardines, and salmon are good sources of vitamin D  Certain foods such as milk, juice, and cereal are fortified with vitamin D      · Give your  infant a vitamin D supplement  of 400 IU each day  · Take vitamin D supplements as directed  High doses of vitamin D can be toxic  Your healthcare provider will tell you how much vitamin D you should take each day  Vitamin D is best absorbed when taken with food  · Expose your skin to sunlight as directed  Ask your healthcare provider how you can safely expose your skin to sunlight and for how long  Too much exposure to sunlight can cause skin cancer  When should I contact my healthcare provider? · You or your child still have symptoms, or your symptoms get worse  · You think you took too much of a vitamin D supplement, and you have nausea, vomiting, or a headache  · You have questions or concerns about your condition or care  CARE AGREEMENT:   You have the right to help plan your care  Learn about your health condition and how it may be treated  Discuss treatment options with your caregivers to decide what care you want to receive  You always have the right to refuse treatment  The above information is an  only  It is not intended as medical advice for individual conditions or treatments  Talk to your doctor, nurse or pharmacist before following any medical regimen to see if it is safe and effective for you  © 2017 [employer] Information is for End User's use only and may not be sold, redistributed or otherwise used for commercial purposes   All illustrations and images included in CareNotes® are the copyrighted property of A D [employer] M [employer]  or Luis Bradley

## 2019-12-05 NOTE — ASSESSMENT & PLAN NOTE
Cholesterol has improved but not yet at goal   Continue to watch diet  Continue with the atorvastatin  Increase fiber in diet  Increase activity level

## 2019-12-05 NOTE — ASSESSMENT & PLAN NOTE
Vitamin-D level consistently low at 24  Discussed with her supplementing her vitamin-D intake with over-the-counter vitamin-D 2000 units on a daily basis  Will recheck with the next set of laboratory testing and consider high-dose vitamin-D if this has not responded

## 2019-12-05 NOTE — ASSESSMENT & PLAN NOTE
Continue follow-up with allergist   Continue with the Xyzal   Continue with the Flonase  Avoid decongestants especially with blood pressure issues  Continue with the Singulair

## 2019-12-05 NOTE — ASSESSMENT & PLAN NOTE
Continue follow-up with Neurology  Some of her headache symptoms are likely related to migraines but this is likely worsened from the allergies and elevated blood pressure  Continue follow-up for the   Botox injections which have been giving her some relief  Watch for triggers

## 2019-12-05 NOTE — ASSESSMENT & PLAN NOTE
Blood pressure remains higher than goal as has her heart rate  Blood pressure has been actually even higher than today  On reviewing previous appointments  Discussed with her changing her verapamil to 240 mg once daily since she has had difficulty remembering she take the morning dose of the verapamil  Watch salt intake  Stay adequately hydrated

## 2020-01-17 ENCOUNTER — TELEPHONE (OUTPATIENT)
Dept: NEUROLOGY | Facility: CLINIC | Age: 50
End: 2020-01-17

## 2020-02-06 ENCOUNTER — TELEPHONE (OUTPATIENT)
Dept: NEUROLOGY | Facility: CLINIC | Age: 50
End: 2020-02-06

## 2020-02-06 ENCOUNTER — PROCEDURE VISIT (OUTPATIENT)
Dept: NEUROLOGY | Facility: CLINIC | Age: 50
End: 2020-02-06
Payer: COMMERCIAL

## 2020-02-06 VITALS — TEMPERATURE: 99 F | DIASTOLIC BLOOD PRESSURE: 84 MMHG | HEART RATE: 88 BPM | SYSTOLIC BLOOD PRESSURE: 138 MMHG

## 2020-02-06 DIAGNOSIS — G43.709 CHRONIC MIGRAINE WITHOUT AURA WITHOUT STATUS MIGRAINOSUS, NOT INTRACTABLE: Primary | ICD-10-CM

## 2020-02-06 PROCEDURE — 64615 CHEMODENERV MUSC MIGRAINE: CPT | Performed by: PHYSICIAN ASSISTANT

## 2020-02-06 RX ORDER — CYPROHEPTADINE HYDROCHLORIDE 4 MG/1
4 TABLET ORAL
Qty: 30 TABLET | Refills: 6 | Status: SHIPPED | OUTPATIENT
Start: 2020-02-06 | End: 2020-03-04 | Stop reason: CLARIF

## 2020-02-06 NOTE — PROGRESS NOTES
Chemodenervation  Date/Time: 2/6/2020 3:19 PM  Performed by: Jaylene Francis PA-C  Authorized by: Jaylene Francis PA-C     Pre-procedure details:     Prepped With: Alcohol    Anesthesia (see MAR for exact dosages): Anesthesia method:  None  Procedure details:     Position:  Upright  Botox:     Botox Type:  Type A    Brand:  Botox    mL's of Botulinum Toxin:  200    Final Concentration per CC:  200 units    Needle Gauge:  30 G 2 5 inch  Procedures:     Botox Procedures: chronic headache      Indications: migraines    Injection Location:     Head / Face:  L superior cervical paraspinal, R superior cervical paraspinal, L , R , L frontalis, R frontalis, L medial occipitalis, R medial occipitalis, procerus, R temporalis, L temporalis, R superior trapezius and L superior trapezius    L  injection amount:  5 unit(s)    R  injection amount:  5 unit(s)    L lateral frontalis:  5 unit(s)    R lateral frontalis:  5 unit(s)    L medial frontalis:  5 unit(s)    R medial frontalis:  5 unit(s)    L temporalis injection amount:  20 unit(s)    R temporalis injection amount:  20 unit(s)    Procerus injection amount:  5 unit(s)    L medial occipitalis injection amount:  15 unit(s)    R medial occipitalis injection amount:  15 unit(s)    L superior cervical paraspinal injection amount:  10 unit(s)    R superior cervical paraspinal injection amount:  10 unit(s)    L superior trapezius injection amount:  15 unit(s)    R superior trapezius injection amount:  15 unit(s)  Total Units:     Total units used:  200    Total units discarded:  0  Post-procedure details:     Chemodenervation:  Chronic migraine    Facial Nerve Location[de-identified]  Bilateral facial nerve    Patient tolerance of procedure:   Tolerated well, no immediate complications  Comments:      5 units right temporalis  40 units left temporalis  All medically necessary

## 2020-02-06 NOTE — TELEPHONE ENCOUNTER
Patient brought in FMLA forms to updated due 2/15/2020  Can be faxed to   Please drop charges, thank you

## 2020-02-07 NOTE — TELEPHONE ENCOUNTER
Completed FMLA forms faxed to [phone] and I provided patient with completed copy  This will be scanned into patient's chart for future reference

## 2020-03-04 ENCOUNTER — OFFICE VISIT (OUTPATIENT)
Dept: NEUROLOGY | Facility: CLINIC | Age: 50
End: 2020-03-04
Payer: COMMERCIAL

## 2020-03-04 VITALS
BODY MASS INDEX: 34.07 KG/M2 | DIASTOLIC BLOOD PRESSURE: 52 MMHG | WEIGHT: 212 LBS | HEART RATE: 91 BPM | SYSTOLIC BLOOD PRESSURE: 140 MMHG | HEIGHT: 66 IN

## 2020-03-04 DIAGNOSIS — G43.709 CHRONIC MIGRAINE WITHOUT AURA WITHOUT STATUS MIGRAINOSUS, NOT INTRACTABLE: Primary | ICD-10-CM

## 2020-03-04 PROCEDURE — 3008F BODY MASS INDEX DOCD: CPT | Performed by: PHYSICIAN ASSISTANT

## 2020-03-04 PROCEDURE — 3077F SYST BP >= 140 MM HG: CPT | Performed by: PHYSICIAN ASSISTANT

## 2020-03-04 PROCEDURE — 3078F DIAST BP <80 MM HG: CPT | Performed by: PHYSICIAN ASSISTANT

## 2020-03-04 PROCEDURE — 3008F BODY MASS INDEX DOCD: CPT | Performed by: FAMILY MEDICINE

## 2020-03-04 PROCEDURE — 99213 OFFICE O/P EST LOW 20 MIN: CPT | Performed by: PHYSICIAN ASSISTANT

## 2020-03-04 PROCEDURE — 1036F TOBACCO NON-USER: CPT | Performed by: PHYSICIAN ASSISTANT

## 2020-03-04 RX ORDER — DEXAMETHASONE 2 MG/1
2 TABLET ORAL
Qty: 5 TABLET | Refills: 0 | Status: SHIPPED | OUTPATIENT
Start: 2020-03-04 | End: 2020-03-09

## 2020-03-04 NOTE — ASSESSMENT & PLAN NOTE
· Since starting Botox pt has had a reduction in migraine headaches by 7 days as correlated by a headache diary  Pt has had decreased trips to the ER and decreased use of abortive medications  · She will continue Botox  · She does have an ENT appt next week to help with the sinus problems  · She will continue the other medications  · A prescription will be given for dexamethasone 2mg daily for 5 days  I have spent 20 minutes with Patient  today in which greater than 50% of this time was spent in counseling/coordination of care regarding Prognosis, Risks and benefits of tx options, Intructions for management, Patient and family education, Importance of tx compliance, Risk factor reductions and Impressions  She will follow-up as scheduled for Botox injections and in 6 months

## 2020-03-04 NOTE — PROGRESS NOTES
Patient ID: Meenu Sarabia is a 51 y o  female  Assessment/Plan:    Chronic migraine without aura without status migrainosus, not intractable  · Since starting Botox pt has had a reduction in migraine headaches by 7 days as correlated by a headache diary  Pt has had decreased trips to the ER and decreased use of abortive medications  · She will continue Botox  · She does have an ENT appt next week to help with the sinus problems  · She will continue the other medications  · A prescription will be given for dexamethasone 2mg daily for 5 days  I have spent 20 minutes with Patient  today in which greater than 50% of this time was spent in counseling/coordination of care regarding Prognosis, Risks and benefits of tx options, Intructions for management, Patient and family education, Importance of tx compliance, Risk factor reductions and Impressions  She will follow-up as scheduled for Botox injections and in 6 months  Problem List Items Addressed This Visit        Cardiovascular and Mediastinum    Chronic migraine without aura without status migrainosus, not intractable - Primary     · Since starting Botox pt has had a reduction in migraine headaches by 7 days as correlated by a headache diary  Pt has had decreased trips to the ER and decreased use of abortive medications  · She will continue Botox  · She does have an ENT appt next week to help with the sinus problems  · She will continue the other medications  · A prescription will be given for dexamethasone 2mg daily for 5 days  I have spent 20 minutes with Patient  today in which greater than 50% of this time was spent in counseling/coordination of care regarding Prognosis, Risks and benefits of tx options, Intructions for management, Patient and family education, Importance of tx compliance, Risk factor reductions and Impressions  She will follow-up as scheduled for Botox injections and in 6 months           Relevant Medications dexamethasone (DECADRON) 2 mg tablet             Subjective:    HPI    We had the pleasure of evaluating Paz Anthony in neurological follow up today  As you know she is a 51year old right handed female  She also has seasonal allergies, hyperlipidemia and GERD  She is a welfare   Since starting Botox pt has had a reduction in migraine headaches by 7 days as correlated by a headache diary  Pt has had decreased trips to the ER and decreased use of abortive medications  She has had a recent increase in headache however due to sinus issues  She does have an ENT appt next week  She did take rizatriptan yesterday which did help  Sleep:   She is getting 4-5 hours of sleep now  She does not feel refreshed  She is not using the cpap and insurance does not cover it  It was helpful when she was using it  She stopped melatonin as well but will go back on it  Chronic Migraine/ Tension Headaches:   What medications do you take or have you taken for your headaches? PREVENTIVE:   - Vitamin B2 400mg, mag oxide,  - Amlodipine, verapamil,  - Pamelor, venlafaxine,Protriptyline  - Gabapentin- side effects, Zonisamide, Topamax,   - Cyproheptadine,   - Botox  ABORTIVE:   - Treximet, Maxalt- helps,   - naproxen, dexamethasone, ibuprofen, Toradol- did help break help     What is her current pain level? 6/10     How often do the headaches occur -   Mild headaches: 1-2 times a week from 2-4 times a week;  Migraines- with Botox 2-3 a month with Botox but more headaches lately due to sinus issues     Are you ever headache free? Not sure as she is having more problem with allergies  Aura/morning and how long does it last? - none     What time of the day do the headaches start -   Mild headaches: mostly in am  Migraine headaches: now in am as well     How long do the headaches last?  Mild headaches: 2 hours with ibuprofen   Migraines: an hour to half a day     Where are they located?      Mild headaches:left sided  Migraine headaches:  start left side of head, frontal region, occipital, neck but much worse     What is the intensity of pain? Mild headache- average pain level 4-5/10;   Migraines- average pain level 8-10/10     Describe your usual headache -   Mild headaches- dull  migraines- Pressure, throbbing at time,  sharp     Associated symptoms:   - nausea, Decrease appetite,   - photophobia, phonophobia, sensitive to smells,  -  Problem with concentration,  -  left pupil size change,   - light-headed or dizzy,   - stiff or sore neck,   - prefer to be alone and in a dark room,  -  irritable, easily frustrated      Number of days missed per month because of headaches:  Work (or school) days: yes missed a day a month and few days a week she is late  Social or Family activities: does not go much but does miss them occasionally     Non-Medical/Alternative Treatments used in the past for headaches: massage   Headache trigger: Fatigue, Stress/Tension, missing meals, chewing, lack of sleep, menstruation, weather     Have you used CBD or THC for your headaches and how often? No     Have you ever had any Brain imaging? yes  I personally reviewed these images  Recent laboratory data was reviewed  Medications and allergies were reviewed  Brain MRI wo contrast 2/26/15  - Several tiny bifrontal subcortical T2 FLAIR and hyperintense foci suspicious for mild chronic micro-vascular changes which may be associated with migraine headaches  - Mild multi-focal paranasal sinus disease w/o air-fluid levels    The following portions of the patient's history were reviewed and updated as appropriate: allergies, current medications, past family history, past medical history, past social history, past surgical history and problem list          Objective:    Blood pressure 140/52, pulse 91, height 5' 6" (1 676 m), weight 96 2 kg (212 lb)  Physical Exam   Eyes: Pupils are equal, round, and reactive to light   Lids are normal  Neurological: She has normal strength and normal reflexes  Coordination normal    Psychiatric: Her speech is normal    Vitals reviewed  Neurological Exam  Mental Status   Oriented to person, place, time and situation  Recent and remote memory are intact  Speech is normal  Language is fluent with no aphasia  Attention and concentration are normal  Fund of knowledge is appropriate for level of education  Apraxia absent  Cranial Nerves  CN II: Visual acuity is normal  Visual fields full to confrontation  CN III, IV, VI: Extraocular movements intact bilaterally  Normal lids and orbits bilaterally  Pupils equal round and reactive to light bilaterally  CN V: Facial sensation is normal   CN VII: Full and symmetric facial movement  CN VIII: Hearing is normal   CN IX, X: Palate elevates symmetrically  Normal gag reflex  CN XI: Shoulder shrug strength is normal   CN XII: Tongue midline without atrophy or fasciculations  Motor   Strength is 5/5 throughout all four extremities  Sensory  Sensation is intact to light touch, pinprick, vibration and proprioception in all four extremities  Reflexes  Deep tendon reflexes are 2+ and symmetric in all four extremities with downgoing toes bilaterally  Coordination  Finger-to-nose, rapid alternating movements and heel-to-shin normal bilaterally without dysmetria  Gait  Normal casual, toe, heel and tandem gait  ROS:    Review of Systems   Constitutional: Positive for fatigue  Negative for appetite change and fever  HENT: Negative  Negative for hearing loss, tinnitus, trouble swallowing and voice change  Eyes: Negative  Negative for photophobia and pain  Respiratory: Positive for shortness of breath  Cardiovascular: Positive for leg swelling  Negative for palpitations  Gastrointestinal: Negative  Negative for nausea and vomiting  Endocrine: Negative  Negative for cold intolerance and heat intolerance  Genitourinary: Negative    Negative for dysuria, frequency and urgency  Musculoskeletal: Positive for back pain and neck pain  Negative for myalgias  Skin: Negative  Negative for rash  Allergic/Immunologic: Negative  Neurological: Positive for headaches (better with botox)  Negative for dizziness, tremors, seizures, syncope, facial asymmetry, speech difficulty, weakness, light-headedness and numbness  Pain around temple area of face   Hematological: Negative  Does not bruise/bleed easily  Psychiatric/Behavioral: Negative  Negative for confusion, hallucinations and sleep disturbance (not well)  Review of systems personally reviewed

## 2020-03-04 NOTE — PATIENT INSTRUCTIONS
Chronic migraine without aura without status migrainosus, not intractable  · Since starting Botox pt has had a reduction in migraine headaches by 7 days as correlated by a headache diary  Pt has had decreased trips to the ER and decreased use of abortive medications  · She will continue Botox  · She does have an ENT appt next week to help with the sinus problems  · She will continue the other medications  · A prescription will be given for dexamethasone 2mg daily for 5 days  I have spent 20 minutes with Patient  today in which greater than 50% of this time was spent in counseling/coordination of care regarding Prognosis, Risks and benefits of tx options, Intructions for management, Patient and family education, Importance of tx compliance, Risk factor reductions and Impressions  She will follow-up as scheduled for Botox injections and in 6 months

## 2020-03-16 ENCOUNTER — NURSE TRIAGE (OUTPATIENT)
Dept: OTHER | Facility: OTHER | Age: 50
End: 2020-03-16

## 2020-03-16 ENCOUNTER — TELEMEDICINE (OUTPATIENT)
Dept: INTERNAL MEDICINE CLINIC | Facility: CLINIC | Age: 50
End: 2020-03-16
Payer: COMMERCIAL

## 2020-03-16 DIAGNOSIS — R06.02 SHORTNESS OF BREATH: ICD-10-CM

## 2020-03-16 DIAGNOSIS — R50.9 FEVER, UNSPECIFIED FEVER CAUSE: Primary | ICD-10-CM

## 2020-03-16 DIAGNOSIS — R05.9 COUGH: ICD-10-CM

## 2020-03-16 LAB
FLUAV RNA NPH QL NAA+PROBE: NORMAL
FLUBV RNA NPH QL NAA+PROBE: NORMAL
RSV RNA NPH QL NAA+PROBE: NORMAL

## 2020-03-16 PROCEDURE — G2012 BRIEF CHECK IN BY MD/QHP: HCPCS | Performed by: FAMILY MEDICINE

## 2020-03-16 PROCEDURE — 87635 SARS-COV-2 COVID-19 AMP PRB: CPT | Performed by: FAMILY MEDICINE

## 2020-03-16 PROCEDURE — 87631 RESP VIRUS 3-5 TARGETS: CPT | Performed by: FAMILY MEDICINE

## 2020-03-16 NOTE — PROGRESS NOTES
COVID-19 Virtual Visit     This virtual check-in was done via telephone  Encounter provider Triston Zaman MD    Provider located at [address]  [address] [facility] [address]    Recent Visits  No visits were found meeting these conditions  Showing recent visits within past 7 days and meeting all other requirements     Future Appointments  No visits were found meeting these conditions  Showing future appointments within next 150 days and meeting all other requirements        Patient agrees to participate in a virtual check in via telephone or video visit instead of presenting to the office to address urgent/immediate medical needs  Patient is aware this is a billable service but at a lesser fee than in person care  After connecting through telephone, the patient was identified by name and date of birth  Silvestre Dee was informed that this was a telemedicine visit and that the exam was being conducted confidentially over secure lines  My office door was closed  No one else was in the room  Silvestre Dee acknowledged consent and understanding of privacy and security of the telemedicine visit  I informed the patient that I have reviewed her record in Epic and presented the opportunity for her to ask any questions regarding the visit today  The patient agreed to participate  Silvestre Dee is a 51 y o  female who is concerned about COVID-19  She reports fever, cough and shortness of breath  She has not  She has not had contact with a person who is under investigation for or who is positive for COVID-19 within the last 14 days  She has not been hospitalized recently for fever and/or lower respiratory symptoms      Past Medical History:   Diagnosis Date    Anxiety     Bruxism (teeth grinding)     Endometriosis     Esophageal reflux     High cholesterol     Migraines     Plantar fasciitis        Past Surgical History: Procedure Laterality Date    BONE GRAFT      L jaw for future false tooth implant     SECTION      FOOT SURGERY Left     LAPAROSCOPY      of uterus    TOOTH EXTRACTION      3 besides wisdom teeth    WISDOM TOOTH EXTRACTION      2 of teeth       Current Outpatient Medications   Medication Sig Dispense Refill    ALBUTEROL IN Inhale      atorvastatin (LIPITOR) 20 mg tablet Take 1 tablet (20 mg total) by mouth daily 90 tablet 3    Botulinum Toxin Type A 200 units SOLR Inject up to 200 units IM into the head and neck muscles by physician every three months 200 Units 4    Cholecalciferol (VITAMIN D3 PO) Take by mouth      fluticasone (FLONASE) 50 mcg/act nasal spray 2 sprays into each nostril daily 16 g 11    levocetirizine (XYZAL) 5 MG tablet Take 1 tablet (5 mg total) by mouth every evening 30 tablet 11    melatonin 3 mg Take 3 mg by mouth daily at bedtime      montelukast (SINGULAIR) 10 mg tablet Take 1 tablet (10 mg total) by mouth daily at bedtime 30 tablet 11    Olopatadine HCl 0 6 % SOLN 2 actuation into each nostril 2 (two) times a day 1 Bottle 11    omeprazole (PriLOSEC) 20 mg delayed release capsule Take 20 mg by mouth daily as needed       Phenylephrine-APAP-guaiFENesin (TYLENOL SINUS SEVERE PO) Take by mouth      Pseudoeph-Doxylamine-DM-APAP (NYQUIL PO) Take by mouth      rizatriptan (MAXALT) 10 MG tablet Take 1 tab at onset of headache, may repeat in 2 hours, max 2 tabs in 24 hours 12 tablet 5    verapamil (CALAN-SR) 240 mg CR tablet Take 1 tablet (240 mg total) by mouth daily at bedtime 90 tablet 1     No current facility-administered medications for this visit  Allergies   Allergen Reactions    Gabapentin Swelling       Video Exam    Cayla Nguyen appears mild distress, cooperative  Pulse ox 97  Temp 100 4     Disposition:      I recommended Cayla Nguyen come to our office for a nasal swab for influenza A/B and RSV, as well as a nasopharyngeal swab for COVID-19      I spent 15 minutes with the patient during this virtual check-in visit

## 2020-03-16 NOTE — TELEPHONE ENCOUNTER
Dr Kavitha Esquivel screened patient  Patient meets testing criteria and is coming to office for testing

## 2020-03-16 NOTE — TELEPHONE ENCOUNTER
Left message for patient to call her PCP this morning for either an appointment or if they would have other recommendations

## 2020-03-16 NOTE — TELEPHONE ENCOUNTER
Regarding: Coronavirus  ----- Message from Loulou Bonner sent at 3/16/2020  6:55 AM EDT -----  " Shortness of breath, bad cough, fatigue and warm to the touch

## 2020-03-17 ENCOUNTER — DOCUMENTATION (OUTPATIENT)
Dept: INTERNAL MEDICINE CLINIC | Facility: CLINIC | Age: 50
End: 2020-03-17

## 2020-03-17 DIAGNOSIS — R06.02 SHORTNESS OF BREATH: ICD-10-CM

## 2020-03-17 DIAGNOSIS — R05.9 COUGH: Primary | ICD-10-CM

## 2020-03-17 DIAGNOSIS — J11.1 FLU: ICD-10-CM

## 2020-03-18 ENCOUNTER — TELEPHONE (OUTPATIENT)
Dept: INTERNAL MEDICINE CLINIC | Facility: CLINIC | Age: 50
End: 2020-03-18

## 2020-03-18 NOTE — TELEPHONE ENCOUNTER
I called and spoke with Samaria Goodson today for an update  She stated she is starting to actually feel better  She is still very tired and still sob/coughing but not nearly as bad as leading up to her appt  She is still following the [employer] protocols

## 2020-03-20 LAB — SARS-COV-2 RNA SPEC QL NAA+PROBE: NOT DETECTED

## 2020-03-24 ENCOUNTER — PATIENT MESSAGE (OUTPATIENT)
Dept: INTERNAL MEDICINE CLINIC | Facility: CLINIC | Age: 50
End: 2020-03-24

## 2020-03-26 DIAGNOSIS — E78.2 MIXED HYPERLIPIDEMIA: ICD-10-CM

## 2020-03-26 RX ORDER — ATORVASTATIN CALCIUM 20 MG/1
20 TABLET, FILM COATED ORAL DAILY
Qty: 90 TABLET | Refills: 3 | Status: SHIPPED | OUTPATIENT
Start: 2020-03-26 | End: 2021-04-06

## 2020-04-02 ENCOUNTER — TELEPHONE (OUTPATIENT)
Dept: NEUROLOGY | Facility: CLINIC | Age: 50
End: 2020-04-02

## 2020-04-03 DIAGNOSIS — G43.709 CHRONIC MIGRAINE WITHOUT AURA WITHOUT STATUS MIGRAINOSUS, NOT INTRACTABLE: ICD-10-CM

## 2020-04-03 RX ORDER — VERAPAMIL HYDROCHLORIDE 240 MG/1
240 TABLET, FILM COATED, EXTENDED RELEASE ORAL
Qty: 90 TABLET | Refills: 1 | Status: SHIPPED | OUTPATIENT
Start: 2020-04-03 | End: 2021-01-04

## 2020-05-07 ENCOUNTER — PROCEDURE VISIT (OUTPATIENT)
Dept: NEUROLOGY | Facility: CLINIC | Age: 50
End: 2020-05-07
Payer: COMMERCIAL

## 2020-05-07 VITALS — TEMPERATURE: 99.2 F | SYSTOLIC BLOOD PRESSURE: 138 MMHG | HEART RATE: 84 BPM | DIASTOLIC BLOOD PRESSURE: 95 MMHG

## 2020-05-07 DIAGNOSIS — G43.709 CHRONIC MIGRAINE WITHOUT AURA WITHOUT STATUS MIGRAINOSUS, NOT INTRACTABLE: Primary | ICD-10-CM

## 2020-05-07 PROCEDURE — 64615 CHEMODENERV MUSC MIGRAINE: CPT | Performed by: PHYSICIAN ASSISTANT

## 2020-06-23 NOTE — TELEPHONE ENCOUNTER
Botox number of units: 200 units  Botox quantity: 1  Arrived at what location: [facility] [address],    [facility]  Deb and I came in this morning at 0700 am and [employer] had left Botox on  at unknown time last evening  Ice packs were still cold but not frozen and Botox was cold to touch however this should be [facility] incase Botox is not good when I open for patient's upcoming appt  I am not sure how long it was sitting overnight       Thank you,    Rosy Saeed
Called [facility]- spoke with [place]- I advised her that we have not received the patient's Botox order yet  She advised me that the patient's Botox order is out for delivery by the end of the day      Tracking #: [id number] via [facility]
Called [facility]- spoke with Polina- I made her aware that I am calling to initiate a refill on the patient's Botox prescription  Refill request initiated  Botox is ready to be scheduled for delivery  Botox to be delivered on Thursday 1/23/2020 to the [facility] location- a signature will be required  Alicja Lilly,    Please await Botox delivery and document once it has arrived  Please let me know if you do not receive the patient's Botox order      Thank you,    Pascual Flair
Called [facility]- spoke with Shekhar Monson- I advised her that we received the Botox but it was left her overnight without anyone being in the office  I advised her that Botox was still cold to touch but wanted to let them know incase the Botox is not good when we go to use it for the patient  She states she will make this documentation on the patient's account 
Mary Ellen,    Botox has not been delivered as of yet  Can you reach out to [facility] to check on status?     Thank you,    Estee Powell
Noted, I will await delivery
Patient is scheduled on 2/6/2020 with Florence Davis in the [facility] 
Type Date User Summary Attachment   General 01/17/2020  2:11 PM Katt Castro care coordination  -   Note    Botox- no authorization needed   Please use  [facility]      Thank you,     Jewell Seals
No

## 2020-06-30 ENCOUNTER — TELEPHONE (OUTPATIENT)
Dept: NEUROLOGY | Facility: CLINIC | Age: 50
End: 2020-06-30

## 2020-08-06 ENCOUNTER — PROCEDURE VISIT (OUTPATIENT)
Dept: NEUROLOGY | Facility: CLINIC | Age: 50
End: 2020-08-06
Payer: COMMERCIAL

## 2020-08-06 VITALS — HEART RATE: 94 BPM | DIASTOLIC BLOOD PRESSURE: 84 MMHG | SYSTOLIC BLOOD PRESSURE: 136 MMHG | TEMPERATURE: 97.5 F

## 2020-08-06 DIAGNOSIS — G43.709 CHRONIC MIGRAINE WITHOUT AURA WITHOUT STATUS MIGRAINOSUS, NOT INTRACTABLE: Primary | ICD-10-CM

## 2020-08-06 PROCEDURE — 64615 CHEMODENERV MUSC MIGRAINE: CPT | Performed by: PHYSICIAN ASSISTANT

## 2020-08-06 NOTE — PROGRESS NOTES
Chemodenervation    Date/Time: 8/6/2020 7:59 AM  Performed by: Nicole Jackson PA-C  Authorized by: Nicole Jackson PA-C     Pre-procedure details:     Prepped With: Alcohol    Anesthesia (see MAR for exact dosages): Anesthesia method:  None  Procedure details:     Position:  Upright  Botox:     Botox Type:  Type A    Brand:  Botox    mL's of Botulinum Toxin:  200    Final Concentration per CC:  200 units    Needle Gauge:  30 G 2 5 inch  Procedures:     Botox Procedures: chronic headache      Indications: migraines    Injection Location:     Head / Face:  L superior cervical paraspinal, R superior cervical paraspinal, L , R , L frontalis, R frontalis, L medial occipitalis, R medial occipitalis, procerus, R temporalis, L temporalis, R superior trapezius and L superior trapezius    L  injection amount:  5 unit(s)    R  injection amount:  5 unit(s)    L lateral frontalis:  5 unit(s)    R lateral frontalis:  5 unit(s)    L medial frontalis:  5 unit(s)    R medial frontalis:  5 unit(s)    L temporalis injection amount:  20 unit(s)    R temporalis injection amount:  20 unit(s)    Procerus injection amount:  5 unit(s)    L medial occipitalis injection amount:  15 unit(s)    R medial occipitalis injection amount:  15 unit(s)    L superior cervical paraspinal injection amount:  10 unit(s)    R superior cervical paraspinal injection amount:  10 unit(s)    L superior trapezius injection amount:  15 unit(s)    R superior trapezius injection amount:  15 unit(s)  Total Units:     Total units used:  200    Total units discarded:  0  Post-procedure details:     Chemodenervation:  Chronic migraine    Facial Nerve Location[de-identified]  Bilateral facial nerve    Patient tolerance of procedure:   Tolerated well, no immediate complications  Comments:      5 units right temporalis  40 units left temporalis  All medically necessary

## 2020-08-25 ENCOUNTER — TELEMEDICINE (OUTPATIENT)
Dept: INTERNAL MEDICINE CLINIC | Facility: CLINIC | Age: 50
End: 2020-08-25
Payer: COMMERCIAL

## 2020-08-25 DIAGNOSIS — E78.2 MIXED HYPERLIPIDEMIA: ICD-10-CM

## 2020-08-25 DIAGNOSIS — G43.709 CHRONIC MIGRAINE WITHOUT AURA WITHOUT STATUS MIGRAINOSUS, NOT INTRACTABLE: ICD-10-CM

## 2020-08-25 DIAGNOSIS — I10 ESSENTIAL HYPERTENSION: Primary | ICD-10-CM

## 2020-08-25 DIAGNOSIS — J32.9 CHRONIC SINUSITIS, UNSPECIFIED LOCATION: ICD-10-CM

## 2020-08-25 DIAGNOSIS — E55.9 VITAMIN D DEFICIENCY: ICD-10-CM

## 2020-08-25 DIAGNOSIS — G47.00 INSOMNIA, UNSPECIFIED TYPE: ICD-10-CM

## 2020-08-25 PROCEDURE — 3075F SYST BP GE 130 - 139MM HG: CPT | Performed by: FAMILY MEDICINE

## 2020-08-25 PROCEDURE — 1036F TOBACCO NON-USER: CPT | Performed by: FAMILY MEDICINE

## 2020-08-25 PROCEDURE — 99214 OFFICE O/P EST MOD 30 MIN: CPT | Performed by: FAMILY MEDICINE

## 2020-08-25 PROCEDURE — 3079F DIAST BP 80-89 MM HG: CPT | Performed by: FAMILY MEDICINE

## 2020-08-25 RX ORDER — ZOLPIDEM TARTRATE 5 MG/1
5 TABLET ORAL
Qty: 30 TABLET | Refills: 3 | Status: SHIPPED | OUTPATIENT
Start: 2020-08-25 | End: 2021-04-06

## 2020-08-25 NOTE — PROGRESS NOTES
Virtual Regular Visit      Assessment/Plan:    Problem List Items Addressed This Visit        Respiratory    Chronic sinusitis    Relevant Orders    CBC and differential       Cardiovascular and Mediastinum    Chronic migraine without aura without status migrainosus, not intractable    Relevant Orders    CBC and differential    Hypertension - Primary    Relevant Orders    CBC and differential    Comprehensive metabolic panel       Other    Insomnia    Relevant Medications    zolpidem (AMBIEN) 5 mg tablet    Other Relevant Orders    CBC and differential    Hyperlipidemia    Relevant Orders    CBC and differential    Comprehensive metabolic panel    Lipid panel    TSH, 3rd generation    Vitamin D deficiency    Relevant Orders    CBC and differential    Vitamin D 25 hydroxy        Orders and recommendations as noted above  Advised her to get her laboratory testing done in the near future  Continue current medications  Discussed her chronic sinus issues with her  Discussed trying nasal irrigation on a regular basis  Follow blood pressure at home  Try to check blood pressure especially when has headaches either sinus or migraine related  Continue follow-up with Neurology for the migraines  Continue with the Botox injections for the headaches  Discussed with her getting adequate sleep  This may help her headaches as well  Discussed options  Has already tried over-the-counter options  Will try Ambien  See orders noted above  Follow-up with gyn regularly  Has slip for mammogram   She will set this up  Will adjust dose of the atorvastatin if needed based on upcoming laboratory testing  Will have her follow up in about 4-6 months or sooner if needed           Reason for visit is   Chief Complaint   Patient presents with    Virtual Regular Visit        Encounter provider Nancy Martin MD    Provider located at [address]  [address] [place] [id number]      Recent Visits  No visits were found meeting these conditions  Showing recent visits within past 7 days and meeting all other requirements     Today's Visits  Date Type Provider Dept   08/25/20 Michelle Avendano MD [facility]   Showing today's visits and meeting all other requirements     Future Appointments  No visits were found meeting these conditions  Showing future appointments within next 150 days and meeting all other requirements        The patient was identified by name and date of birth  Rachel Brito was informed that this is a telemedicine visit and that the visit is being conducted through [employer]  My office door was closed  No one else was in the room  She acknowledged consent and understanding of privacy and security of the video platform  The patient has agreed to participate and understands they can discontinue the visit at any time  Patient is aware this is a billable service  Kuldip Hodge is a 51 y o  female evaluated via [employer] teams for follow-up  She presents for routine follow-up  She was evaluated by [employer] teams  Has generally been the having the same issues  She continues to have chronic headaches  She follows up with Neurology regarding her headaches and continues with the Botox injections  These help somewhat with the migraines but has been having increased issues with sinus issues and sinus headaches  He has tried the Flonase nasal spray as well as the Singulair  Has tried over-the-counter sinus medication with minimal alleviation  Has not followed up with ENT recently  Weather changes seem to worsen this  Has checked her blood pressure at times at home  Her daughter is a nursing student he and has she checked it  Not recently  Denies any chest pain or palpitations  Denies any significant shortness of breath  Continues with the verapamil without difficulty    Tolerating the atorvastatin well  Denies any significant muscle aches or weakness  Appetite is stable  Denies any nausea, vomiting, or diarrhea  Denies any abdominal pain         Past Medical History:   Diagnosis Date    Anxiety     Bruxism (teeth grinding)     Endometriosis     Esophageal reflux     High cholesterol     Migraines     Plantar fasciitis        Past Surgical History:   Procedure Laterality Date    BONE GRAFT      L jaw for future false tooth implant     SECTION      FOOT SURGERY Left     LAPAROSCOPY      of uterus    TOOTH EXTRACTION      3 besides wisdom teeth    WISDOM TOOTH EXTRACTION      2 of teeth       Current Outpatient Medications   Medication Sig Dispense Refill    ALBUTEROL IN Inhale      atorvastatin (LIPITOR) 20 mg tablet Take 1 tablet (20 mg total) by mouth daily 90 tablet 3    Botulinum Toxin Type A 200 units SOLR Inject up to 200 units IM into the head and neck muscles by physician every three months 200 Units 4    Cholecalciferol (VITAMIN D3 PO) Take by mouth      fluticasone (FLONASE) 50 mcg/act nasal spray 2 sprays into each nostril daily 16 g 11    melatonin 3 mg Take 3 mg by mouth daily at bedtime      montelukast (SINGULAIR) 10 mg tablet Take 1 tablet (10 mg total) by mouth daily at bedtime 30 tablet 11    Olopatadine HCl 0 6 % SOLN 2 actuation into each nostril 2 (two) times a day 1 Bottle 11    omeprazole (PriLOSEC) 20 mg delayed release capsule Take 20 mg by mouth daily as needed       Phenylephrine-APAP-guaiFENesin (TYLENOL SINUS SEVERE PO) Take by mouth      verapamil (CALAN-SR) 240 mg CR tablet Take 1 tablet (240 mg total) by mouth daily at bedtime 90 tablet 1    rizatriptan (MAXALT) 10 MG tablet Take 1 tab at onset of headache, may repeat in 2 hours, max 2 tabs in 24 hours (Patient not taking: Reported on 2020) 12 tablet 5    zolpidem (AMBIEN) 5 mg tablet Take 1 tablet (5 mg total) by mouth daily at bedtime as needed for sleep 30 tablet 3     No current facility-administered medications for this visit  Allergies   Allergen Reactions    Gabapentin Swelling       Review of Systems   Constitutional: Positive for fatigue  Negative for activity change, appetite change, chills and fever  HENT: Positive for postnasal drip and sinus pressure  Negative for rhinorrhea  Eyes: Negative for visual disturbance  Respiratory: Negative for cough, chest tightness and shortness of breath  Cardiovascular: Negative for chest pain and palpitations  Gastrointestinal: Negative for abdominal pain, blood in stool, diarrhea, nausea and vomiting  Endocrine: Negative for polydipsia, polyphagia and polyuria  Genitourinary: Negative for dysuria, frequency and urgency  Musculoskeletal: Negative for gait problem  Skin: Negative for color change  Neurological: Positive for headaches  Negative for dizziness, weakness and numbness  Hematological: Does not bruise/bleed easily  Psychiatric/Behavioral: Positive for sleep disturbance  Negative for confusion  The patient is not nervous/anxious  Video Exam    There were no vitals filed for this visit  Physical Exam  HENT:      Head: Normocephalic and atraumatic  Eyes:      General: Lids are normal  No allergic shiner  Pulmonary:      Effort: No tachypnea  Neurological:      Mental Status: She is alert and oriented to person, place, and time  Psychiatric:         Attention and Perception: Attention and perception normal          Mood and Affect: Mood and affect normal          Behavior: Behavior is cooperative  Comments: Tired appearing          I spent 25 minutes directly with the patient during this visit      Shankar Crawford 39 acknowledges that she has consented to an online visit or consultation   She understands that the online visit is based solely on information provided by her, and that, in the absence of a face-to-face physical evaluation by the physician, the diagnosis she receives is both limited and provisional in terms of accuracy and completeness  This is not intended to replace a full medical face-to-face evaluation by the physician  Iglesia Clos understands and accepts these terms

## 2020-08-27 ENCOUNTER — TELEPHONE (OUTPATIENT)
Dept: NEUROLOGY | Facility: CLINIC | Age: 50
End: 2020-08-27

## 2020-08-27 NOTE — TELEPHONE ENCOUNTER
Spoke with patient about r/s appt on 9/9 with Bianka   Patient said cancel it and she will give us a call back when she's ready to schedule

## 2020-09-23 ENCOUNTER — TELEPHONE (OUTPATIENT)
Dept: NEUROLOGY | Facility: CLINIC | Age: 50
End: 2020-09-23

## 2020-09-23 NOTE — TELEPHONE ENCOUNTER
Type  Date  User  Summary  Attachment    General  09/23/2020 11:33 AM  Ann   care coordination  -    Note     Botox- no authorization needed   Please use  [facility]      Thank you,     Monique Minus

## 2020-09-23 NOTE — TELEPHONE ENCOUNTER
Type  Date  User  Summary  Attachment    General  09/23/2020 11:33 AM  Ann   care coordination  -    Note     Botox- no authorization needed   Please use  [facility]      Thank you,     Monique Minus

## 2020-10-02 NOTE — TELEPHONE ENCOUNTER
Prior authorization submitted to [employer] via Fax on 10/2/2020  Will await approval/denial letter

## 2020-10-06 NOTE — TELEPHONE ENCOUNTER
Called  [facility]- spoke with Dewayne Quiros- I advised her that I did receive an approval from [facility]  I provided her with the approval information  She states she is going to see if they entered this- if not she will need to send this information to the benefits team to add it in the authorization to be processed  She states she was able to get a paid claim  She states the order is ready to be scheduled for delivery  Botox to be delivered on Thursday 10/8/2020 to the [facility] location via [address] [employer] next day [facility]- a signature will be required  Jacque Samuel,    Please await the patient's Botox order and document once it has arrived  Please let me know if you do not receive the patient's Botox order      Thank you    Tal Clinton

## 2020-10-06 NOTE — TELEPHONE ENCOUNTER
Received an approval letter from [address] [place] [address]  Approval letter has been scanned into patient's chart under "media" for future reference      Authorization information:    Authorization #: [id number]  Valid dates: 10/2/2020 until 10/2/2021

## 2020-10-08 NOTE — TELEPHONE ENCOUNTER
Botox number of units: 200 units   Botox quantity:1  Arrived at what location: [address] [facility] Date: 05/2023    Placed in [address]  and logged

## 2020-10-15 ENCOUNTER — TELEPHONE (OUTPATIENT)
Dept: INTERNAL MEDICINE CLINIC | Facility: CLINIC | Age: 50
End: 2020-10-15

## 2020-10-19 DIAGNOSIS — G43.709 CHRONIC MIGRAINE WITHOUT AURA WITHOUT STATUS MIGRAINOSUS, NOT INTRACTABLE: Primary | ICD-10-CM

## 2020-10-19 DIAGNOSIS — J32.9 CHRONIC SINUSITIS, UNSPECIFIED LOCATION: ICD-10-CM

## 2020-10-19 RX ORDER — PREDNISONE 10 MG/1
40 TABLET ORAL DAILY
Qty: 20 TABLET | Refills: 0 | Status: SHIPPED | OUTPATIENT
Start: 2020-10-19 | End: 2020-10-24

## 2020-10-22 ENCOUNTER — TELEPHONE (OUTPATIENT)
Dept: NEUROLOGY | Facility: CLINIC | Age: 50
End: 2020-10-22

## 2020-11-05 ENCOUNTER — PROCEDURE VISIT (OUTPATIENT)
Dept: NEUROLOGY | Facility: CLINIC | Age: 50
End: 2020-11-05
Payer: COMMERCIAL

## 2020-11-05 VITALS — HEART RATE: 85 BPM | SYSTOLIC BLOOD PRESSURE: 126 MMHG | DIASTOLIC BLOOD PRESSURE: 71 MMHG | TEMPERATURE: 97.8 F

## 2020-11-05 DIAGNOSIS — G43.709 CHRONIC MIGRAINE WITHOUT AURA WITHOUT STATUS MIGRAINOSUS, NOT INTRACTABLE: Primary | ICD-10-CM

## 2020-11-05 PROCEDURE — 64615 CHEMODENERV MUSC MIGRAINE: CPT | Performed by: PHYSICIAN ASSISTANT

## 2020-11-05 RX ORDER — CYPROHEPTADINE HYDROCHLORIDE 4 MG/1
4 TABLET ORAL
Qty: 30 TABLET | Refills: 4 | Status: SHIPPED | OUTPATIENT
Start: 2020-11-05 | End: 2022-03-21

## 2020-12-29 ENCOUNTER — TELEPHONE (OUTPATIENT)
Dept: NEUROLOGY | Facility: CLINIC | Age: 50
End: 2020-12-29

## 2021-01-04 DIAGNOSIS — G43.709 CHRONIC MIGRAINE WITHOUT AURA WITHOUT STATUS MIGRAINOSUS, NOT INTRACTABLE: ICD-10-CM

## 2021-01-04 RX ORDER — VERAPAMIL HYDROCHLORIDE 240 MG/1
TABLET, FILM COATED, EXTENDED RELEASE ORAL
Qty: 90 TABLET | Refills: 1 | Status: SHIPPED | OUTPATIENT
Start: 2021-01-04 | End: 2021-11-29

## 2021-01-13 DIAGNOSIS — E55.9 VITAMIN D DEFICIENCY: ICD-10-CM

## 2021-01-13 DIAGNOSIS — J32.9 CHRONIC SINUSITIS, UNSPECIFIED LOCATION: ICD-10-CM

## 2021-01-13 DIAGNOSIS — I10 ESSENTIAL HYPERTENSION: Primary | ICD-10-CM

## 2021-01-13 DIAGNOSIS — E78.2 MIXED HYPERLIPIDEMIA: ICD-10-CM

## 2021-01-13 RX ORDER — AMOXICILLIN AND CLAVULANATE POTASSIUM 875; 125 MG/1; MG/1
1 TABLET, FILM COATED ORAL 2 TIMES DAILY
Qty: 20 TABLET | Refills: 0 | Status: SHIPPED | OUTPATIENT
Start: 2021-01-13 | End: 2021-01-23

## 2021-01-25 ENCOUNTER — TELEPHONE (OUTPATIENT)
Dept: NEUROLOGY | Facility: CLINIC | Age: 51
End: 2021-01-25

## 2021-01-25 NOTE — TELEPHONE ENCOUNTER
Called to remind patient of their upcoming appointment in 2 weeks in [facility]  Asked if there has been any change in symptoms or if patient has any urgent concerns prior to their appointment  Patient stated that they are doing well and no changes to report  Patient made aware that we will be calling back two days prior to appointment to complete COVID screening

## 2021-02-04 ENCOUNTER — PROCEDURE VISIT (OUTPATIENT)
Dept: NEUROLOGY | Facility: CLINIC | Age: 51
End: 2021-02-04
Payer: COMMERCIAL

## 2021-02-04 VITALS — HEART RATE: 72 BPM | DIASTOLIC BLOOD PRESSURE: 92 MMHG | TEMPERATURE: 98.2 F | SYSTOLIC BLOOD PRESSURE: 143 MMHG

## 2021-02-04 DIAGNOSIS — G43.709 CHRONIC MIGRAINE WITHOUT AURA WITHOUT STATUS MIGRAINOSUS, NOT INTRACTABLE: Primary | ICD-10-CM

## 2021-02-04 PROCEDURE — 64615 CHEMODENERV MUSC MIGRAINE: CPT | Performed by: PHYSICIAN ASSISTANT

## 2021-02-04 NOTE — PROGRESS NOTES
Universal Protocol   Consent: Verbal consent obtained  Written consent obtained  Risks and benefits: risks, benefits and alternatives were discussed  Consent given by: patient  Time out: Immediately prior to procedure a "time out" was called to verify the correct patient, procedure, equipment, support staff and site/side marked as required  Patient understanding: patient states understanding of the procedure being performed  Patient consent: the patient's understanding of the procedure matches consent given  Procedure consent: procedure consent matches procedure scheduled  Relevant documents: relevant documents present and verified  Patient identity confirmed: verbally with patient        Chemodenervation     Date/Time 2/4/2021 8:24 AM     Performed by  Kalpesh Madsen PA-C     Authorized by Kalpesh Madsen PA-C        Pre-procedure details      Prepped With: Alcohol     Anesthesia  (see MAR for exact dosages):      Anesthesia method:  None   Procedure details     Position:  Upright   Botox     Botox Type:  Type A    Brand:  Botox    mL's of Botulinum Toxin:  200    Final Concentration per CC:  50 units    Needle Gauge:  30 G 2 5 inch   Procedures     Botox Procedures: chronic headache      Indications: migraines     Injection Location      Head / Face:  L superior cervical paraspinal, R superior cervical paraspinal, L , R , L frontalis, R frontalis, L medial occipitalis, R medial occipitalis, procerus, R temporalis, L temporalis, R superior trapezius and L superior trapezius    L  injection amount:  5 unit(s)    R  injection amount:  5 unit(s)    L lateral frontalis:  5 unit(s)    R lateral frontalis:  5 unit(s)    L medial frontalis:  5 unit(s)    R medial frontalis:  5 unit(s)    L temporalis injection amount:  20 unit(s)    R temporalis injection amount:  20 unit(s)    Procerus injection amount:  5 unit(s)    L medial occipitalis injection amount:  15 unit(s)    R medial occipitalis injection amount:  15 unit(s)    L superior cervical paraspinal injection amount:  10 unit(s)    R superior cervical paraspinal injection amount:  10 unit(s)    L superior trapezius injection amount:  15 unit(s)    R superior trapezius injection amount:  15 unit(s)   Total Units     Total units used:  200    Total units discarded:  0   Post-procedure details      Chemodenervation:  Chronic migraine    Facial Nerve Location[de-identified]  Bilateral facial nerve    Patient tolerance of procedure:   Tolerated well, no immediate complications   Comments      5 units orbicularis oculi bilaterally  10 units apex  5 units masseter bilaterally  15 units left temporalis  All medically necessary

## 2021-03-17 ENCOUNTER — TELEPHONE (OUTPATIENT)
Dept: NEUROLOGY | Facility: CLINIC | Age: 51
End: 2021-03-17

## 2021-03-17 NOTE — TELEPHONE ENCOUNTER
Pt called and states that her FMLA is due  Gave her the fax number to fax forms to  Made her aware of fee and turn around time   She will fax them to [phone]

## 2021-03-22 ENCOUNTER — TELEPHONE (OUTPATIENT)
Dept: NEUROLOGY | Facility: CLINIC | Age: 51
End: 2021-03-22

## 2021-03-22 NOTE — TELEPHONE ENCOUNTER
Received FMLA Update form  Dropped charge and called patient to collect  Tried to leave a message but her voicemail was not set up yet  Will call back later  Scanned form into chart

## 2021-03-30 DIAGNOSIS — Z23 ENCOUNTER FOR IMMUNIZATION: ICD-10-CM

## 2021-04-05 ENCOUNTER — TELEPHONE (OUTPATIENT)
Dept: NEUROLOGY | Facility: CLINIC | Age: 51
End: 2021-04-05

## 2021-04-05 NOTE — TELEPHONE ENCOUNTER
Received an approval letter from [facility] for the patient's Botox authorization  Approval letter has been scanned into the patient's chart under "media" for future reference      Authorization information:    Authorization #: [id number]  Valid dates: 4/5/2021 until 4/4/2022  Please use our stock

## 2021-04-05 NOTE — TELEPHONE ENCOUNTER
Called patient and was unable to leave a voicemail to let the patient know that her [facility] paperwork has been filled out and faxed over to her employer

## 2021-04-05 NOTE — TELEPHONE ENCOUNTER
Received an approval letter from [address]  for the patient's Botox authorization  Approval letter has been scanned into the patient's chart under "media" for future reference      Authorization information:    Authorization #: [id number]  Valid dates: 4/5/2021 until 4/4/2022  Please use our stock

## 2021-04-05 NOTE — TELEPHONE ENCOUNTER
[facility] [address]- spoke with Jill Hooks- I advised her I was calling to discuss benefit information for the patient for Botox  I provided her with the following codes: 07744,  to be completed in the office setting, out patient  She was able to advise me of the following information:    88608: no authorization required  : authorization required through [phone]    Benefit information:    Deductible: $400 00 met: $317 18 Remaining: $82 82  Out of pocket max:$8,550 00 met: $425 86 Remaining: $8,154 14  Co-insurance: 0%    - Covered at 100% after deductible is met- no co-pay is applied  - Out of pocket includes deductible and co-pays  - Specialty Pharmacy: [facility]- no other specialty pharmacy is contracted  Provide can do a buy and bill  Call reference #: [phone]    Attempted to contact the patient to discuss Botox benefits  I was unable to leave a voicemail as her mailbox is full

## 2021-04-05 NOTE — TELEPHONE ENCOUNTER
Botox authorization faxed to [address] [place] [address] Mily Mas  on 4/5/2021  Will await an approval/denial letter

## 2021-04-06 ENCOUNTER — DOCUMENTATION (OUTPATIENT)
Dept: NEUROLOGY | Facility: CLINIC | Age: 51
End: 2021-04-06

## 2021-04-06 DIAGNOSIS — E78.2 MIXED HYPERLIPIDEMIA: ICD-10-CM

## 2021-04-06 DIAGNOSIS — G47.00 INSOMNIA, UNSPECIFIED TYPE: ICD-10-CM

## 2021-04-06 RX ORDER — ATORVASTATIN CALCIUM 20 MG/1
TABLET, FILM COATED ORAL
Qty: 90 TABLET | Refills: 3 | Status: SHIPPED | OUTPATIENT
Start: 2021-04-06 | End: 2022-05-25

## 2021-04-06 RX ORDER — ZOLPIDEM TARTRATE 5 MG/1
TABLET ORAL
Qty: 30 TABLET | Refills: 2 | Status: SHIPPED | OUTPATIENT
Start: 2021-04-06 | End: 2021-12-02

## 2021-04-06 NOTE — PROGRESS NOTES
Type Date User Summary Attachment   General 04/05/2021  3:56 PM Bienvenido Adams care coordination  -   Note    Botox- authorization #: [id number]- valid from 4/5/2021 until 4/4/2022   Please use our stock      Thank you,     [facility]

## 2021-04-06 NOTE — PROGRESS NOTES
Patient is scheduled 5/5/2021 with Fleurette Fitting in the [facility] location

## 2021-05-10 ENCOUNTER — TELEPHONE (OUTPATIENT)
Dept: NEUROLOGY | Facility: CLINIC | Age: 51
End: 2021-05-10

## 2021-05-10 NOTE — TELEPHONE ENCOUNTER
Patient called back and I rescheduled her for next week 05/20/21 at 08:15am in the  with Chelsea Dumont

## 2021-05-10 NOTE — TELEPHONE ENCOUNTER
LVM for patient to call office to reschedule appt that was scheduled with Mehnaz Medina on 5/5/21  cancelled via [employer]

## 2021-05-20 ENCOUNTER — PROCEDURE VISIT (OUTPATIENT)
Dept: NEUROLOGY | Facility: CLINIC | Age: 51
End: 2021-05-20
Payer: COMMERCIAL

## 2021-05-20 VITALS — HEART RATE: 89 BPM | TEMPERATURE: 97.6 F | SYSTOLIC BLOOD PRESSURE: 135 MMHG | DIASTOLIC BLOOD PRESSURE: 93 MMHG

## 2021-05-20 DIAGNOSIS — G43.709 CHRONIC MIGRAINE WITHOUT AURA WITHOUT STATUS MIGRAINOSUS, NOT INTRACTABLE: Primary | ICD-10-CM

## 2021-05-20 PROCEDURE — 64615 CHEMODENERV MUSC MIGRAINE: CPT | Performed by: PHYSICIAN ASSISTANT

## 2021-05-20 NOTE — PROGRESS NOTES
Universal Protocol   Consent: Verbal consent obtained  Written consent obtained  Risks and benefits: risks, benefits and alternatives were discussed  Consent given by: patient  Time out: Immediately prior to procedure a "time out" was called to verify the correct patient, procedure, equipment, support staff and site/side marked as required  Patient understanding: patient states understanding of the procedure being performed  Patient consent: the patient's understanding of the procedure matches consent given  Procedure consent: procedure consent matches procedure scheduled  Relevant documents: relevant documents present and verified  Patient identity confirmed: verbally with patient        Chemodenervation     Date/Time 5/20/2021 8:45 AM     Performed by  Heladio Wayne PA-C     Authorized by Heladio Wayne PA-C        Pre-procedure details      Prepped With: Alcohol     Anesthesia  (see MAR for exact dosages):      Anesthesia method:  None   Procedure details     Position:  Upright   Botox     Botox Type:  Type A    Brand:  Botox    mL's of Botulinum Toxin:  200    Final Concentration per CC:  50 units    Needle Gauge:  30 G 2 5 inch   Procedures     Botox Procedures: chronic headache      Indications: migraines     Injection Location      Head / Face:  L superior cervical paraspinal, R superior cervical paraspinal, L , R , L frontalis, R frontalis, L medial occipitalis, R medial occipitalis, procerus, R temporalis, L temporalis, R superior trapezius and L superior trapezius    L  injection amount:  5 unit(s)    R  injection amount:  5 unit(s)    L lateral frontalis:  5 unit(s)    R lateral frontalis:  5 unit(s)    L medial frontalis:  5 unit(s)    R medial frontalis:  5 unit(s)    L temporalis injection amount:  20 unit(s)    R temporalis injection amount:  20 unit(s)    Procerus injection amount:  5 unit(s)    L medial occipitalis injection amount:  15 unit(s)    R medial occipitalis injection amount:  15 unit(s)    L superior cervical paraspinal injection amount:  10 unit(s)    R superior cervical paraspinal injection amount:  10 unit(s)    L superior trapezius injection amount:  15 unit(s)    R superior trapezius injection amount:  15 unit(s)   Total Units     Total units used:  200    Total units discarded:  0   Post-procedure details      Chemodenervation:  Chronic migraine    Facial Nerve Location[de-identified]  Bilateral facial nerve    Patient tolerance of procedure:   Tolerated well, no immediate complications   Comments      5 units orbicularis oculi bilaterally  10 units apex  5 units masseter bilaterally  15 units left temporalis  All medically necessary

## 2021-05-24 ENCOUNTER — IMMUNIZATIONS (OUTPATIENT)
Dept: FAMILY MEDICINE CLINIC | Facility: HOSPITAL | Age: 51
End: 2021-05-24

## 2021-05-24 DIAGNOSIS — Z23 ENCOUNTER FOR IMMUNIZATION: Primary | ICD-10-CM

## 2021-05-24 PROCEDURE — 0001A SARS-COV-2 / COVID-19 MRNA VACCINE (PFIZER-BIONTECH) 30 MCG: CPT

## 2021-05-24 PROCEDURE — 91300 SARS-COV-2 / COVID-19 MRNA VACCINE (PFIZER-BIONTECH) 30 MCG: CPT

## 2021-06-17 ENCOUNTER — IMMUNIZATIONS (OUTPATIENT)
Dept: FAMILY MEDICINE CLINIC | Facility: HOSPITAL | Age: 51
End: 2021-06-17

## 2021-06-17 DIAGNOSIS — Z23 ENCOUNTER FOR IMMUNIZATION: Primary | ICD-10-CM

## 2021-06-17 PROCEDURE — 0002A SARS-COV-2 / COVID-19 MRNA VACCINE (PFIZER-BIONTECH) 30 MCG: CPT

## 2021-06-17 PROCEDURE — 91300 SARS-COV-2 / COVID-19 MRNA VACCINE (PFIZER-BIONTECH) 30 MCG: CPT

## 2021-08-23 ENCOUNTER — TELEPHONE (OUTPATIENT)
Dept: NEUROLOGY | Facility: CLINIC | Age: 51
End: 2021-08-23

## 2021-08-23 NOTE — TELEPHONE ENCOUNTER
Patient cancelling appointment and would like a call back to reschedule Botox appointment    Please assist

## 2021-08-23 NOTE — TELEPHONE ENCOUNTER
pt called and states that she has botox appt today at 3:30     she was advised in the past not to take ibuprofen before botox  currrently has a migriane  she woke up with migraine and now getting worse  she is asking what she can take due to having botox this afternoon  she would normally take rizatriptan and 2 ibuprofen  she has not taken anything yet today     please advise  [phone] to leave detailed message

## 2021-08-23 NOTE — TELEPHONE ENCOUNTER
I spoke to Ubaldo regarding below and she stated that pt can take rizatriptan and ibuprofen but she may bleed a little bit more with botox    Pt made aware of above

## 2021-08-27 ENCOUNTER — TELEPHONE (OUTPATIENT)
Dept: NEUROLOGY | Facility: CLINIC | Age: 51
End: 2021-08-27

## 2021-08-27 NOTE — TELEPHONE ENCOUNTER
Patient aware compazine and dexamethasone sent to her pharmacy  She can still use rizatriptan and ibuprofen as directed if needed

## 2021-08-27 NOTE — TELEPHONE ENCOUNTER
Patient of Jenise Goel [place];    she was scheduled for botox a few days ago but had to cancel because she wasn't feeling well  Last botox was 5/10/2021  She is calling to report daily migraines x 6 days, currently a "9/10", throbbing affecting her  entire head with  light sensitivity and  Nausea,  despite taking rizatriptan and ibuprofen as recommended  and trying ice/heat  Preventative  botox   verapamil 240 mg   not taking periactin    Said dexamethasone helped to break cycle in the past  Also  receptive to anti-emetic to help migraines/nausea as well if in agreement  Please provide recommendation, thank you  Patito:  Please call her to reschedule botox, thank you     uses [facility] [phone], okay to leave detailed message

## 2021-08-27 NOTE — TELEPHONE ENCOUNTER
Patient canceled 8/23/21 botox and need to reschedule  She advised she has called multiple times and left message, and has not been scheduled yet  Please reach out ASAP to schedule botox w/Smiley Quintero Bare

## 2021-09-10 ENCOUNTER — PROCEDURE VISIT (OUTPATIENT)
Dept: NEUROLOGY | Facility: CLINIC | Age: 51
End: 2021-09-10
Payer: COMMERCIAL

## 2021-09-10 VITALS — HEART RATE: 85 BPM | DIASTOLIC BLOOD PRESSURE: 77 MMHG | TEMPERATURE: 97.8 F | SYSTOLIC BLOOD PRESSURE: 119 MMHG

## 2021-09-10 DIAGNOSIS — G43.009 MIGRAINE WITHOUT AURA AND WITHOUT STATUS MIGRAINOSUS, NOT INTRACTABLE: ICD-10-CM

## 2021-09-10 DIAGNOSIS — G43.709 CHRONIC MIGRAINE WITHOUT AURA WITHOUT STATUS MIGRAINOSUS, NOT INTRACTABLE: Primary | ICD-10-CM

## 2021-09-10 PROCEDURE — 64615 CHEMODENERV MUSC MIGRAINE: CPT | Performed by: PHYSICIAN ASSISTANT

## 2021-09-10 RX ORDER — RIMEGEPANT SULFATE 75 MG/75MG
75 TABLET, ORALLY DISINTEGRATING ORAL EVERY OTHER DAY
Qty: 16 TABLET | Refills: 11 | Status: SHIPPED | OUTPATIENT
Start: 2021-09-10

## 2021-09-10 NOTE — PROGRESS NOTES
Universal Protocol   Consent: Verbal consent obtained  Written consent obtained  Risks and benefits: risks, benefits and alternatives were discussed  Consent given by: patient  Time out: Immediately prior to procedure a "time out" was called to verify the correct patient, procedure, equipment, support staff and site/side marked as required  Patient understanding: patient states understanding of the procedure being performed  Patient consent: the patient's understanding of the procedure matches consent given  Procedure consent: procedure consent matches procedure scheduled  Relevant documents: relevant documents present and verified  Patient identity confirmed: verbally with patient        Chemodenervation     Date/Time 9/10/2021 9:50 AM     Performed by  Bang Jaime PA-C     Authorized by Bang Jaime PA-C        Pre-procedure details      Prepped With: Alcohol     Anesthesia  (see MAR for exact dosages):      Anesthesia method:  None   Procedure details     Position:  Upright   Botox     Botox Type:  Type A    Brand:  Botox    mL's of Botulinum Toxin:  200    Final Concentration per CC:  50 units    Needle Gauge:  30 G 2 5 inch   Procedures     Botox Procedures: chronic headache      Indications: migraines     Injection Location      Head / Face:  L superior cervical paraspinal, R superior cervical paraspinal, L , R , L frontalis, R frontalis, L medial occipitalis, R medial occipitalis, procerus, R temporalis, L temporalis, R superior trapezius and L superior trapezius    L  injection amount:  5 unit(s)    R  injection amount:  5 unit(s)    L lateral frontalis:  5 unit(s)    R lateral frontalis:  5 unit(s)    L medial frontalis:  5 unit(s)    R medial frontalis:  5 unit(s)    L temporalis injection amount:  20 unit(s)    R temporalis injection amount:  20 unit(s)    Procerus injection amount:  5 unit(s)    L medial occipitalis injection amount:  15 unit(s)    R medial occipitalis injection amount:  15 unit(s)    L superior cervical paraspinal injection amount:  10 unit(s)    R superior cervical paraspinal injection amount:  10 unit(s)    L superior trapezius injection amount:  15 unit(s)    R superior trapezius injection amount:  15 unit(s)   Total Units     Total units used:  200    Total units discarded:  0   Post-procedure details      Chemodenervation:  Chronic migraine    Facial Nerve Location[de-identified]  Bilateral facial nerve    Patient tolerance of procedure:   Tolerated well, no immediate complications   Comments      5 units orbicularis oculi bilaterally   10 units apex   5 units masseter bilaterally   15 units left temporalis   All medically necessary

## 2021-11-27 DIAGNOSIS — G43.709 CHRONIC MIGRAINE WITHOUT AURA WITHOUT STATUS MIGRAINOSUS, NOT INTRACTABLE: ICD-10-CM

## 2021-11-29 RX ORDER — VERAPAMIL HYDROCHLORIDE 240 MG/1
TABLET, FILM COATED, EXTENDED RELEASE ORAL
Qty: 90 TABLET | Refills: 1 | Status: SHIPPED | OUTPATIENT
Start: 2021-11-29 | End: 2022-05-25

## 2021-12-01 DIAGNOSIS — G47.00 INSOMNIA, UNSPECIFIED TYPE: ICD-10-CM

## 2021-12-01 PROCEDURE — U0003 INFECTIOUS AGENT DETECTION BY NUCLEIC ACID (DNA OR RNA); SEVERE ACUTE RESPIRATORY SYNDROME CORONAVIRUS 2 (SARS-COV-2) (CORONAVIRUS DISEASE [COVID-19]), AMPLIFIED PROBE TECHNIQUE, MAKING USE OF HIGH THROUGHPUT TECHNOLOGIES AS DESCRIBED BY CMS-2020-01-R: HCPCS | Performed by: FAMILY MEDICINE

## 2021-12-01 PROCEDURE — U0005 INFEC AGEN DETEC AMPLI PROBE: HCPCS | Performed by: FAMILY MEDICINE

## 2021-12-02 ENCOUNTER — OFFICE VISIT (OUTPATIENT)
Dept: INTERNAL MEDICINE CLINIC | Facility: CLINIC | Age: 51
End: 2021-12-02
Payer: COMMERCIAL

## 2021-12-02 VITALS — TEMPERATURE: 99.4 F | OXYGEN SATURATION: 96 % | HEART RATE: 82 BPM

## 2021-12-02 DIAGNOSIS — U07.1 COVID-19: Primary | ICD-10-CM

## 2021-12-02 PROCEDURE — 99213 OFFICE O/P EST LOW 20 MIN: CPT | Performed by: FAMILY MEDICINE

## 2021-12-02 RX ORDER — METHYLPREDNISOLONE 4 MG/1
TABLET ORAL
Qty: 21 EACH | Refills: 0 | Status: SHIPPED | OUTPATIENT
Start: 2021-12-02 | End: 2021-12-22

## 2021-12-02 RX ORDER — ZOLPIDEM TARTRATE 5 MG/1
TABLET ORAL
Qty: 30 TABLET | Refills: 1 | Status: SHIPPED | OUTPATIENT
Start: 2021-12-02 | End: 2021-12-15

## 2021-12-02 RX ORDER — DOXYCYCLINE 100 MG/1
100 CAPSULE ORAL 2 TIMES DAILY
Qty: 14 CAPSULE | Refills: 0 | Status: SHIPPED | OUTPATIENT
Start: 2021-12-02 | End: 2021-12-09

## 2021-12-06 ENCOUNTER — TELEPHONE (OUTPATIENT)
Dept: INTERNAL MEDICINE CLINIC | Facility: CLINIC | Age: 51
End: 2021-12-06

## 2021-12-09 ENCOUNTER — TELEPHONE (OUTPATIENT)
Dept: INTERNAL MEDICINE CLINIC | Facility: CLINIC | Age: 51
End: 2021-12-09

## 2021-12-15 DIAGNOSIS — G47.00 INSOMNIA, UNSPECIFIED TYPE: ICD-10-CM

## 2021-12-15 RX ORDER — ZOLPIDEM TARTRATE 5 MG/1
TABLET ORAL
Qty: 30 TABLET | Refills: 3 | Status: SHIPPED | OUTPATIENT
Start: 2021-12-15 | End: 2022-05-17

## 2021-12-16 ENCOUNTER — APPOINTMENT (OUTPATIENT)
Dept: LAB | Facility: CLINIC | Age: 51
End: 2021-12-16
Payer: COMMERCIAL

## 2021-12-16 ENCOUNTER — PROCEDURE VISIT (OUTPATIENT)
Dept: NEUROLOGY | Facility: CLINIC | Age: 51
End: 2021-12-16
Payer: COMMERCIAL

## 2021-12-16 VITALS — DIASTOLIC BLOOD PRESSURE: 73 MMHG | TEMPERATURE: 97.6 F | HEART RATE: 89 BPM | SYSTOLIC BLOOD PRESSURE: 119 MMHG

## 2021-12-16 DIAGNOSIS — E78.2 MIXED HYPERLIPIDEMIA: ICD-10-CM

## 2021-12-16 DIAGNOSIS — E55.9 VITAMIN D DEFICIENCY: ICD-10-CM

## 2021-12-16 DIAGNOSIS — I10 ESSENTIAL HYPERTENSION: ICD-10-CM

## 2021-12-16 DIAGNOSIS — G43.709 CHRONIC MIGRAINE WITHOUT AURA WITHOUT STATUS MIGRAINOSUS, NOT INTRACTABLE: Primary | ICD-10-CM

## 2021-12-16 LAB
25(OH)D3 SERPL-MCNC: 49.3 NG/ML (ref 30–100)
ALBUMIN SERPL BCP-MCNC: 3.2 G/DL (ref 3.5–5)
ALP SERPL-CCNC: 89 U/L (ref 46–116)
ALT SERPL W P-5'-P-CCNC: 17 U/L (ref 12–78)
ANION GAP SERPL CALCULATED.3IONS-SCNC: 6 MMOL/L (ref 4–13)
AST SERPL W P-5'-P-CCNC: 11 U/L (ref 5–45)
BASOPHILS # BLD AUTO: 0.04 THOUSANDS/ΜL (ref 0–0.1)
BASOPHILS NFR BLD AUTO: 0 % (ref 0–1)
BILIRUB SERPL-MCNC: 0.56 MG/DL (ref 0.2–1)
BUN SERPL-MCNC: 7 MG/DL (ref 5–25)
CALCIUM ALBUM COR SERPL-MCNC: 9.5 MG/DL (ref 8.3–10.1)
CALCIUM SERPL-MCNC: 8.9 MG/DL (ref 8.3–10.1)
CHLORIDE SERPL-SCNC: 108 MMOL/L (ref 100–108)
CHOLEST SERPL-MCNC: 220 MG/DL
CO2 SERPL-SCNC: 27 MMOL/L (ref 21–32)
CREAT SERPL-MCNC: 0.83 MG/DL (ref 0.6–1.3)
EOSINOPHIL # BLD AUTO: 0.2 THOUSAND/ΜL (ref 0–0.61)
EOSINOPHIL NFR BLD AUTO: 2 % (ref 0–6)
ERYTHROCYTE [DISTWIDTH] IN BLOOD BY AUTOMATED COUNT: 13.9 % (ref 11.6–15.1)
GFR SERPL CREATININE-BSD FRML MDRD: 83 ML/MIN/1.73SQ M
GLUCOSE P FAST SERPL-MCNC: 94 MG/DL (ref 65–99)
HCT VFR BLD AUTO: 44.3 % (ref 34.8–46.1)
HDLC SERPL-MCNC: 47 MG/DL
HGB BLD-MCNC: 14.7 G/DL (ref 11.5–15.4)
IMM GRANULOCYTES # BLD AUTO: 0.03 THOUSAND/UL (ref 0–0.2)
IMM GRANULOCYTES NFR BLD AUTO: 0 % (ref 0–2)
LDLC SERPL CALC-MCNC: 137 MG/DL (ref 0–100)
LYMPHOCYTES # BLD AUTO: 2.62 THOUSANDS/ΜL (ref 0.6–4.47)
LYMPHOCYTES NFR BLD AUTO: 26 % (ref 14–44)
MCH RBC QN AUTO: 30.9 PG (ref 26.8–34.3)
MCHC RBC AUTO-ENTMCNC: 33.2 G/DL (ref 31.4–37.4)
MCV RBC AUTO: 93 FL (ref 82–98)
MONOCYTES # BLD AUTO: 1.03 THOUSAND/ΜL (ref 0.17–1.22)
MONOCYTES NFR BLD AUTO: 10 % (ref 4–12)
NEUTROPHILS # BLD AUTO: 6.2 THOUSANDS/ΜL (ref 1.85–7.62)
NEUTS SEG NFR BLD AUTO: 62 % (ref 43–75)
NONHDLC SERPL-MCNC: 173 MG/DL
NRBC BLD AUTO-RTO: 0 /100 WBCS
PLATELET # BLD AUTO: 375 THOUSANDS/UL (ref 149–390)
PMV BLD AUTO: 10 FL (ref 8.9–12.7)
POTASSIUM SERPL-SCNC: 3.4 MMOL/L (ref 3.5–5.3)
PROT SERPL-MCNC: 6.9 G/DL (ref 6.4–8.2)
RBC # BLD AUTO: 4.75 MILLION/UL (ref 3.81–5.12)
SODIUM SERPL-SCNC: 141 MMOL/L (ref 136–145)
TRIGL SERPL-MCNC: 178 MG/DL
TSH SERPL DL<=0.05 MIU/L-ACNC: 4.42 UIU/ML (ref 0.36–3.74)
WBC # BLD AUTO: 10.12 THOUSAND/UL (ref 4.31–10.16)

## 2021-12-16 PROCEDURE — 80061 LIPID PANEL: CPT

## 2021-12-16 PROCEDURE — 84443 ASSAY THYROID STIM HORMONE: CPT

## 2021-12-16 PROCEDURE — 80053 COMPREHEN METABOLIC PANEL: CPT

## 2021-12-16 PROCEDURE — 85025 COMPLETE CBC W/AUTO DIFF WBC: CPT

## 2021-12-16 PROCEDURE — 64615 CHEMODENERV MUSC MIGRAINE: CPT | Performed by: PHYSICIAN ASSISTANT

## 2021-12-16 PROCEDURE — 36415 COLL VENOUS BLD VENIPUNCTURE: CPT

## 2021-12-16 PROCEDURE — 82306 VITAMIN D 25 HYDROXY: CPT

## 2021-12-22 PROBLEM — L20.84 INTRINSIC ATOPIC DERMATITIS: Status: RESOLVED | Noted: 2019-09-24 | Resolved: 2021-12-22

## 2021-12-23 ENCOUNTER — OFFICE VISIT (OUTPATIENT)
Dept: INTERNAL MEDICINE CLINIC | Facility: CLINIC | Age: 51
End: 2021-12-23
Payer: COMMERCIAL

## 2021-12-23 VITALS
HEART RATE: 92 BPM | DIASTOLIC BLOOD PRESSURE: 78 MMHG | SYSTOLIC BLOOD PRESSURE: 128 MMHG | HEIGHT: 66 IN | WEIGHT: 207 LBS | BODY MASS INDEX: 33.27 KG/M2 | OXYGEN SATURATION: 97 %

## 2021-12-23 DIAGNOSIS — Z12.31 ENCOUNTER FOR SCREENING MAMMOGRAM FOR BREAST CANCER: ICD-10-CM

## 2021-12-23 DIAGNOSIS — Z00.00 ANNUAL PHYSICAL EXAM: Primary | ICD-10-CM

## 2021-12-23 DIAGNOSIS — J45.991 COUGH VARIANT ASTHMA: ICD-10-CM

## 2021-12-23 DIAGNOSIS — Z23 NEED FOR INFLUENZA VACCINATION: ICD-10-CM

## 2021-12-23 DIAGNOSIS — G43.709 CHRONIC MIGRAINE WITHOUT AURA WITHOUT STATUS MIGRAINOSUS, NOT INTRACTABLE: ICD-10-CM

## 2021-12-23 DIAGNOSIS — E55.9 VITAMIN D DEFICIENCY: ICD-10-CM

## 2021-12-23 DIAGNOSIS — I10 PRIMARY HYPERTENSION: ICD-10-CM

## 2021-12-23 DIAGNOSIS — R94.6 NONSPECIFIC ABNORMAL RESULTS OF FUNCTION STUDY OF THYROID: ICD-10-CM

## 2021-12-23 DIAGNOSIS — E78.2 MIXED HYPERLIPIDEMIA: ICD-10-CM

## 2021-12-23 LAB — SL AMB POCT HEMOGLOBIN AIC: 5.4 (ref ?–6.5)

## 2021-12-23 PROCEDURE — 83036 HEMOGLOBIN GLYCOSYLATED A1C: CPT | Performed by: FAMILY MEDICINE

## 2021-12-23 PROCEDURE — 3725F SCREEN DEPRESSION PERFORMED: CPT | Performed by: FAMILY MEDICINE

## 2021-12-23 PROCEDURE — 99396 PREV VISIT EST AGE 40-64: CPT | Performed by: FAMILY MEDICINE

## 2021-12-23 PROCEDURE — 3008F BODY MASS INDEX DOCD: CPT | Performed by: FAMILY MEDICINE

## 2021-12-23 PROCEDURE — 90471 IMMUNIZATION ADMIN: CPT

## 2021-12-23 PROCEDURE — 90682 RIV4 VACC RECOMBINANT DNA IM: CPT

## 2022-02-09 ENCOUNTER — TELEMEDICINE (OUTPATIENT)
Dept: NEUROLOGY | Facility: CLINIC | Age: 52
End: 2022-02-09
Payer: COMMERCIAL

## 2022-02-09 DIAGNOSIS — J30.1 CHRONIC SEASONAL ALLERGIC RHINITIS DUE TO POLLEN: ICD-10-CM

## 2022-02-09 DIAGNOSIS — G43.709 CHRONIC MIGRAINE WITHOUT AURA WITHOUT STATUS MIGRAINOSUS, NOT INTRACTABLE: Primary | ICD-10-CM

## 2022-02-09 DIAGNOSIS — J30.89 ALLERGIC RHINITIS DUE TO HOUSE DUST MITE: ICD-10-CM

## 2022-02-09 PROCEDURE — 99214 OFFICE O/P EST MOD 30 MIN: CPT | Performed by: PHYSICIAN ASSISTANT

## 2022-02-09 RX ORDER — FLUTICASONE PROPIONATE 50 MCG
2 SPRAY, SUSPENSION (ML) NASAL DAILY
Qty: 16 G | Refills: 11 | Status: SHIPPED | OUTPATIENT
Start: 2022-02-09 | End: 2023-02-09

## 2022-02-09 NOTE — PROGRESS NOTES
Virtual Regular Visit  Verification of patient location:  Patient is located in the following state in which I hold an active license [place]      Assessment/Plan:    Problem List Items Addressed This Visit        Respiratory    Allergic rhinitis due to house dust mite    Relevant Medications    fluticasone (FLONASE) 50 mcg/act nasal spray    Chronic seasonal allergic rhinitis due to pollen    Relevant Medications    fluticasone (FLONASE) 50 mcg/act nasal spray       Cardiovascular and Mediastinum    Chronic migraine without aura without status migrainosus, not intractable - Primary  Preventative:  Verapamil 240mg  Botox injections every 3 months  With botox has had a reduction of at least 7 migraine days with less abortive medication, less ER visits which correlates to headache diary    · Recommend changing Nurtec 75mg from an abortive medication to preventative  She should use it every other day for the last month pending her next botox injections to help reduce migraine days  Reason for visit is   Chief Complaint   Patient presents with    Virtual Regular Visit        Encounter provider Terrence Cosme PA-C    Provider located at [address] [place] [address] [facility] [address] [address]  [phone]      Recent Visits  No visits were found meeting these conditions  Showing recent visits within past 7 days and meeting all other requirements  Today's Visits  Date Type Provider Dept   02/09/22 [facility] [place] [facility] today's visits and meeting all other requirements  Future Appointments  No visits were found meeting these conditions  Showing future appointments within next 150 days and meeting all other requirements       The patient was identified by name and date of birth   Erickeren Corrales was informed that this is a telemedicine visit and that the visit is being conducted through [facility] and patient was informed this is a secure, HIPAA-complaint platform  She agrees to proceed     My office door was closed  No one else was in the room  She acknowledged consent and understanding of privacy and security of the video platform  The patient has agreed to participate and understands they can discontinue the visit at any time  Patient is aware this is a billable service  Wilber Lomeli is a 49 y o  female who is following up in regards to her migraines  The month before she is due for her botox she has increase in her migraines  During the first two months she averages once a week or two  She states her sinuses contribute to her migraines  Over the last few weeks she's had 4 bad ones  If she can catch her migraines early and use the Nurtec she will have improvement in 1 5-2 hours  Last botox 12/16/21  Last office appointment 3/4/2020      PREVENTIVE:  - verapamil 240mg   - Botox every 3 months - With botox has had a reduction of at least 7 migraine days with less abortive medication, less ER visits which correlates to headache diary      Prior:  - cyproheptadine   - gabapentin - side effects  - pamelor, venlafaxine, protriptyline  - zonisamide, topamax  - amlodipine    ABORTIVE:   Current:  - Nurtec 75mg prn    Prior:  - Treximet, Maxalt   - naproxen, dexamethasone, ibuprofen, Toradol- did help break help     What is her current pain level? 6/10     How often do the headaches occur -   Mild headaches: 1-2 times a week from 2-4 times a week;  Migraines- with Botox 2-3 a month with Botox but more headaches lately due to sinus issues     Are you ever headache free? Yes     Aura/warning sign and how long does it last? No aura but warning sign is spike of pain in left temporal      What time of the day do the headaches start -   Mild headaches: morning  Migraine headaches: morning     How long do the headaches last?  Mild headaches: 2 hours with ibuprofen   Migraines: with nurtec 1 5-2 hours  If medication doesn't help 1/2 day to full day       Where are they located?     Mild headaches: left sided  Migraine headaches: spike in left temporal region then takes over entire left side and apex  Rarely located on right side       What is the intensity of pain? Mild headache- average pain level 4-5/10;   Migraines - 1-2 months after botox average pain 6-7/10; last month of botox 9-10/10     Describe your usual headache -   Mild headaches- dull  migraines- Pressure, throbbing at time,  sharp     Associated symptoms (migraines):   - nausea, Decrease appetite,   - photophobia, phonophobia, sensitive to smells,  -  Problem with concentration,  -  left pupil size change,   - light-headed or dizzy,   - stiff or sore neck,   - prefer to be alone and in a dark room,  -  irritable, easily frustrated      Number of days missed per month because of headaches:  Work (or school) days: will miss a few hours on occasion  Social or Family activities: no     Non-Medical/Alternative Treatments used in the past for headaches: massage   Headache trigger: Fatigue, Stress/Tension, missing meals, chewing, lack of sleep, menstruation, weather     Have you used CBD or THC for your headaches and how often? No    Water intake: 40-60 oz  Caffeine intake: coffee 12-16 oz; occasional iced tea    Sleep:  Has difficulty falling asleep  Ambien is helpful  Some days it takes 2 hours after medication for her to fall asleep  Occasionally use sleepy time tea  Estimates 6-7 hours per night  History of moderate sleep apnea, diagnosed in 2018  Does not use CPAP         HPI     Past Medical History:   Diagnosis Date    Anxiety     Bruxism (teeth grinding)     Endometriosis     Esophageal reflux     High cholesterol     Migraines     Plantar fasciitis        Past Surgical History:   Procedure Laterality Date    BONE GRAFT      L jaw for future false tooth implant     SECTION      FOOT SURGERY Left     LAPAROSCOPY      of uterus    TOOTH EXTRACTION      3 besides wisdom teeth    WISDOM TOOTH EXTRACTION      2 of teeth       Current Outpatient Medications   Medication Sig Dispense Refill    atorvastatin (LIPITOR) 20 mg tablet take 1 tablet by mouth once daily 90 tablet 3    Botulinum Toxin Type A 200 units SOLR Inject up to 200 units IM into the head and neck muscles by physician every three months 200 Units 4    Cholecalciferol (VITAMIN D3 PO) Take by mouth      melatonin 3 mg Take 3 mg by mouth daily at bedtime      omeprazole (PriLOSEC) 20 mg delayed release capsule Take 20 mg by mouth daily as needed       prochlorperazine (COMPAZINE) 10 mg tablet Take 1 tablet (10 mg total) by mouth every 8 (eight) hours as needed for nausea or vomiting (migraine) 10 tablet 0    Rimegepant Sulfate (Nurtec) 75 MG TBDP Take 75 mg by mouth every other day 16 tablet 11    verapamil (CALAN-SR) 240 mg CR tablet swallow 1 tablet once daily at bedtime 90 tablet 1    zolpidem (AMBIEN) 5 mg tablet take 1 tablet by mouth at bedtime if needed for sleep 30 tablet 3    ALBUTEROL IN Inhale (Patient not taking: Reported on 2/9/2022 )      cyproheptadine (PERIACTIN) 4 mg tablet Take 1 tablet (4 mg total) by mouth daily at bedtime as needed for allergies (headaches) (Patient not taking: Reported on 2/9/2022 ) 30 tablet 4    fluticasone (FLONASE) 50 mcg/act nasal spray 2 sprays into each nostril daily 16 g 11    Olopatadine HCl 0 6 % SOLN 2 actuation into each nostril 2 (two) times a day 1 Bottle 11    rizatriptan (MAXALT) 10 MG tablet Take 1 tab at onset of headache, may repeat in 2 hours, max 2 tabs in 24 hours (Patient not taking: Reported on 8/25/2020) 12 tablet 5     No current facility-administered medications for this visit  Allergies   Allergen Reactions    Gabapentin Swelling       Review of Systems   Constitutional: Negative  Negative for appetite change and fever  HENT: Negative    Negative for hearing loss, tinnitus, trouble swallowing and voice change  Eyes: Negative  Negative for photophobia and pain  Respiratory: Negative  Negative for shortness of breath  Cardiovascular: Negative  Negative for palpitations  Gastrointestinal: Negative  Negative for nausea and vomiting  Endocrine: Negative  Negative for cold intolerance  Genitourinary: Negative  Negative for dysuria, frequency and urgency  Musculoskeletal: Negative  Negative for myalgias and neck pain  Skin: Negative  Negative for rash  Neurological: Positive for headaches  Negative for dizziness, tremors, seizures, syncope, facial asymmetry, speech difficulty, weakness, light-headedness and numbness  Hematological: Negative  Does not bruise/bleed easily  Psychiatric/Behavioral: Negative  Negative for confusion, hallucinations and sleep disturbance  ROS reviewed and edited as needed  Video Exam    There were no vitals filed for this visit  Physical Exam   CONSTITUTIONAL:  Well developed, well nourished, well groomed  No dysmorphic features  MENTAL STATUS  Orientation: Alert and oriented  Fund of knowledge: Intact  PSYCHIATRIC:  Normal behavior and appropriate affect    CRANIAL NERVES  Conjugate gaze  Extraocular movements intact  No nystagmus  Facial sensation normal V1-V3  Facial movements normal and symmetric  Intact gross hearing bilaterally  Intact trapezius  Tongue protrudes to the midline      COORDINATION   No pronator drift appreciated  Finger to nose: normal bilaterally    MOTOR (Upper and lower extremities)   Bulk/tone/abnormal movement: Normal muscle bulk and tone  HOLLAND antigravity without gross weakness  I spent 30 minutes directly with the patient during this visit    VIRTUAL VISIT [facility] verbally agrees to participate in [employer]   Pt is aware that [employer] could be limited without vital signs or the ability to perform a full hands-on physical exam  Tommy PERALTA Jordi Pena understands she or the provider may request at any time to terminate the video visit and request the patient to seek care or treatment in person

## 2022-02-21 ENCOUNTER — TELEPHONE (OUTPATIENT)
Dept: INTERNAL MEDICINE CLINIC | Facility: CLINIC | Age: 52
End: 2022-02-21

## 2022-02-21 NOTE — TELEPHONE ENCOUNTER
Pt called that she's again unable to get her ambien  She told her they won't let her get it yet, that she needs an Molly Del Real  I did ask for her prescription information but she didn't have it  Did call the pharmacy and they stated that her insucrance only allows 45 in 75 days  Did request pharmacy info  BIN: [id number]  [id number]  ID: [id number]    Did Molly Del Real through cover my meds and received an approval right away  Called and let pt know, and called pharmacy to let them know the auth went through  They were able to run it and would call pt once filled

## 2022-03-02 ENCOUNTER — TELEPHONE (OUTPATIENT)
Dept: NEUROLOGY | Facility: CLINIC | Age: 52
End: 2022-03-02

## 2022-03-02 NOTE — TELEPHONE ENCOUNTER
Received blank FMLA form from [facility] form was scanned to  and given to [employer] were dropped in the amount of $15 00

## 2022-03-15 NOTE — TELEPHONE ENCOUNTER
Forms have been filled out, faxed over and I will have the completed forms scanned into the chart  Called patient and made her aware

## 2022-03-17 ENCOUNTER — TELEPHONE (OUTPATIENT)
Dept: OTHER | Facility: OTHER | Age: 52
End: 2022-03-17

## 2022-03-17 NOTE — TELEPHONE ENCOUNTER
Patient canceled appointment for 3/17/2022  Offered patient a another appointment but patient would like to be seen sooner then April  Patient is requesting a call back to set up a new appointment

## 2022-03-18 ENCOUNTER — HOSPITAL ENCOUNTER (OUTPATIENT)
Dept: MAMMOGRAPHY | Facility: HOSPITAL | Age: 52
Discharge: HOME | End: 2022-03-18
Payer: COMMERCIAL

## 2022-03-18 VITALS — HEIGHT: 66 IN | BODY MASS INDEX: 33.27 KG/M2 | WEIGHT: 207 LBS

## 2022-03-18 DIAGNOSIS — Z12.31 ENCOUNTER FOR SCREENING MAMMOGRAM FOR BREAST CANCER: ICD-10-CM

## 2022-03-18 PROCEDURE — 77063 BREAST TOMOSYNTHESIS BI: CPT

## 2022-03-18 PROCEDURE — 77067 SCR MAMMO BI INCL CAD: CPT

## 2022-03-19 DIAGNOSIS — G43.709 CHRONIC MIGRAINE WITHOUT AURA WITHOUT STATUS MIGRAINOSUS, NOT INTRACTABLE: ICD-10-CM

## 2022-03-21 RX ORDER — CYPROHEPTADINE HYDROCHLORIDE 4 MG/1
TABLET ORAL
Qty: 30 TABLET | Refills: 4 | Status: SHIPPED | OUTPATIENT
Start: 2022-03-21

## 2022-04-28 ENCOUNTER — HOSPITAL ENCOUNTER (OUTPATIENT)
Dept: ULTRASOUND IMAGING | Facility: HOSPITAL | Age: 52
Discharge: HOME | End: 2022-04-28
Payer: COMMERCIAL

## 2022-04-28 ENCOUNTER — HOSPITAL ENCOUNTER (OUTPATIENT)
Dept: MAMMOGRAPHY | Facility: HOSPITAL | Age: 52
Discharge: HOME | End: 2022-04-28
Payer: COMMERCIAL

## 2022-04-28 VITALS — WEIGHT: 207.01 LBS | BODY MASS INDEX: 33.27 KG/M2 | HEIGHT: 66 IN

## 2022-04-28 DIAGNOSIS — R92.8 ABNORMAL SCREENING MAMMOGRAM: ICD-10-CM

## 2022-04-28 PROCEDURE — 77065 DX MAMMO INCL CAD UNI: CPT

## 2022-04-28 PROCEDURE — G0279 TOMOSYNTHESIS, MAMMO: HCPCS

## 2022-05-13 ENCOUNTER — TELEPHONE (OUTPATIENT)
Dept: INTERNAL MEDICINE CLINIC | Facility: CLINIC | Age: 52
End: 2022-05-13

## 2022-05-13 NOTE — TELEPHONE ENCOUNTER
Pt called stating she only received 15 tabs for her Ambien  She stated to get 30 it is going to need an authorization  I see there was one a few mths ago, but states it needs auth every few mths

## 2022-05-17 ENCOUNTER — OFFICE VISIT (OUTPATIENT)
Dept: INTERNAL MEDICINE CLINIC | Facility: CLINIC | Age: 52
End: 2022-05-17
Payer: COMMERCIAL

## 2022-05-17 VITALS — TEMPERATURE: 97.2 F

## 2022-05-17 DIAGNOSIS — L03.211 CELLULITIS, FACE: Primary | ICD-10-CM

## 2022-05-17 DIAGNOSIS — G47.00 INSOMNIA, UNSPECIFIED TYPE: ICD-10-CM

## 2022-05-17 PROCEDURE — 99213 OFFICE O/P EST LOW 20 MIN: CPT | Performed by: FAMILY MEDICINE

## 2022-05-17 PROCEDURE — 1036F TOBACCO NON-USER: CPT | Performed by: FAMILY MEDICINE

## 2022-05-17 RX ORDER — AMOXICILLIN AND CLAVULANATE POTASSIUM 875; 125 MG/1; MG/1
1 TABLET, FILM COATED ORAL 2 TIMES DAILY
Qty: 14 TABLET | Refills: 0 | Status: SHIPPED | OUTPATIENT
Start: 2022-05-17 | End: 2022-05-24

## 2022-05-17 RX ORDER — ZOLPIDEM TARTRATE 5 MG/1
TABLET ORAL
Qty: 30 TABLET | Refills: 3 | Status: SHIPPED | OUTPATIENT
Start: 2022-05-17

## 2022-05-18 NOTE — PROGRESS NOTES
Assessment/Plan:    No problem-specific Assessment & Plan notes found for this encounter  Diagnoses and all orders for this visit:    Cellulitis, face  -     amoxicillin-clavulanate (AUGMENTIN) 875-125 mg per tablet; Take 1 tablet by mouth in the morning and 1 tablet in the evening  Do all this for 7 days  orders recommendations as noted above  Warm compresses to the areas  Watch for any worsening  Watch for any increased swelling into the eyelids  Augmentin as noted above  Take this with food  Potential GI side effects discussed  Call or follow-up if symptoms worsen or persist       Subjective:      Patient ID: Angle Angulo is a 49 y o  female  She presents for acute visit  Started few days ago with pimple between her eyes over the nasal bridge  She had tried to squeeze the area but was unable to express anything  Became much more swollen and more sensitive  Some swelling extending into the eyelids  Denies any fevers or chills  Denies any vision changes  The following portions of the patient's history were reviewed and updated as appropriate: She  has a past medical history of Anxiety, Bruxism (teeth grinding), Endometriosis, Esophageal reflux, High cholesterol, Migraines, and Plantar fasciitis    She   Patient Active Problem List    Diagnosis Date Noted    Cellulitis, face 05/17/2022    Shortness of breath 03/16/2020    Allergic conjunctivitis of both eyes 09/24/2019    Allergic rhinitis due to house dust mite 09/24/2019    Chronic seasonal allergic rhinitis due to pollen 09/24/2019    Peripheral edema 02/05/2019    Tachycardia 02/05/2019    Hypersomnia 11/29/2018    Chronic rhinitis 11/29/2018    Obesity (BMI 30-39 9) 11/29/2018    Chondromalacia patellae 10/05/2018    Chronic migraine without aura without status migrainosus, not intractable 09/19/2018    Insomnia 09/19/2018    Vitamin D deficiency 09/26/2017    Anxiety 04/06/2017    Hyperlipidemia 12/28/2016    Nonspecific abnormal results of function study of thyroid 2016    Fatigue 2015    Hypertension 2015    Obstructive sleep apnea 2015    Cough variant asthma 2013    Chronic sinusitis 2013     She  has a past surgical history that includes  section; Foot surgery (Left); LAPAROSCOPY; [place] tooth extraction; Tooth extraction; and Bone graft  Her family history includes Abdominal aortic aneurysm in her father; Allergies in her brother, daughter, daughter, sister, and sister; Asperger's syndrome in her brother and family; Bipolar disorder in her family and sister; Cancer in her maternal uncle; Colon cancer in her maternal grandmother; Depression in her family and mother; Diabetes in her family; Heart murmur in her mother; Hypertension in her family and father; Hypothyroidism in her sister; No Known Problems in her maternal grandfather, paternal grandfather, and paternal grandmother; Other in her mother; Raynaud syndrome in her family, sister, and sister  She was adopted  She  reports that she has never smoked  She has never used smokeless tobacco  She reports current alcohol use  She reports that she does not use drugs  Current Outpatient Medications   Medication Sig Dispense Refill    amoxicillin-clavulanate (AUGMENTIN) 875-125 mg per tablet Take 1 tablet by mouth in the morning and 1 tablet in the evening  Do all this for 7 days   14 tablet 0    ALBUTEROL IN Inhale (Patient not taking: Reported on 2022 )      atorvastatin (LIPITOR) 20 mg tablet take 1 tablet by mouth once daily 90 tablet 3    Botulinum Toxin Type A 200 units SOLR Inject up to 200 units IM into the head and neck muscles by physician every three months 200 Units 4    Cholecalciferol (VITAMIN D3 PO) Take by mouth      cyproheptadine (PERIACTIN) 4 mg tablet take 1 tablet by mouth at bedtime if needed for allergies 30 tablet 4    fluticasone (FLONASE) 50 mcg/act nasal spray 2 sprays into each nostril daily 16 g 11    melatonin 3 mg Take 3 mg by mouth daily at bedtime      Olopatadine HCl 0 6 % SOLN 2 actuation into each nostril 2 (two) times a day 1 Bottle 11    omeprazole (PriLOSEC) 20 mg delayed release capsule Take 20 mg by mouth daily as needed       prochlorperazine (COMPAZINE) 10 mg tablet Take 1 tablet (10 mg total) by mouth every 8 (eight) hours as needed for nausea or vomiting (migraine) 10 tablet 0    Rimegepant Sulfate (Nurtec) 75 MG TBDP Take 75 mg by mouth every other day 16 tablet 11    rizatriptan (MAXALT) 10 MG tablet Take 1 tab at onset of headache, may repeat in 2 hours, max 2 tabs in 24 hours (Patient not taking: Reported on 8/25/2020) 12 tablet 5    verapamil (CALAN-SR) 240 mg CR tablet swallow 1 tablet once daily at bedtime 90 tablet 1    zolpidem (AMBIEN) 5 mg tablet take 1 tablet by mouth at bedtime if needed for sleep 30 tablet 3     No current facility-administered medications for this visit       Current Outpatient Medications on File Prior to Visit   Medication Sig    ALBUTEROL IN Inhale (Patient not taking: Reported on 2/9/2022 )    atorvastatin (LIPITOR) 20 mg tablet take 1 tablet by mouth once daily    Botulinum Toxin Type A 200 units SOLR Inject up to 200 units IM into the head and neck muscles by physician every three months    Cholecalciferol (VITAMIN D3 PO) Take by mouth    cyproheptadine (PERIACTIN) 4 mg tablet take 1 tablet by mouth at bedtime if needed for allergies    fluticasone (FLONASE) 50 mcg/act nasal spray 2 sprays into each nostril daily    melatonin 3 mg Take 3 mg by mouth daily at bedtime    Olopatadine HCl 0 6 % SOLN 2 actuation into each nostril 2 (two) times a day    omeprazole (PriLOSEC) 20 mg delayed release capsule Take 20 mg by mouth daily as needed     prochlorperazine (COMPAZINE) 10 mg tablet Take 1 tablet (10 mg total) by mouth every 8 (eight) hours as needed for nausea or vomiting (migraine)    Rimegepant Sulfate (Nurtec) 75 MG TBDP Take 75 mg by mouth every other day    rizatriptan (MAXALT) 10 MG tablet Take 1 tab at onset of headache, may repeat in 2 hours, max 2 tabs in 24 hours (Patient not taking: Reported on 8/25/2020)    verapamil (CALAN-SR) 240 mg CR tablet swallow 1 tablet once daily at bedtime    [DISCONTINUED] zolpidem (AMBIEN) 5 mg tablet take 1 tablet by mouth at bedtime if needed for sleep     No current facility-administered medications on file prior to visit  She is allergic to gabapentin       Review of Systems   Constitutional: Negative for activity change, chills and fever  Eyes: Negative for visual disturbance  Skin:        As per HPI         Objective:      Temp (!) 97 2 °F (36 2 °C)          Physical Exam  Vitals and nursing note reviewed  Constitutional:       Appearance: She is well-developed and well-groomed  Eyes:      Extraocular Movements: Extraocular movements intact  Comments: Slight swelling of the lower forehead and in to eyelids bilaterally right greater than left; swollen area between the eyes over the nasal bridge with significant tenderness and slight erythema   Neurological:      Mental Status: She is alert  Psychiatric:         Behavior: Behavior is cooperative

## 2022-05-25 DIAGNOSIS — E78.2 MIXED HYPERLIPIDEMIA: ICD-10-CM

## 2022-05-25 DIAGNOSIS — G43.709 CHRONIC MIGRAINE WITHOUT AURA WITHOUT STATUS MIGRAINOSUS, NOT INTRACTABLE: ICD-10-CM

## 2022-05-25 RX ORDER — VERAPAMIL HYDROCHLORIDE 240 MG/1
TABLET, FILM COATED, EXTENDED RELEASE ORAL
Qty: 90 TABLET | Refills: 1 | Status: SHIPPED | OUTPATIENT
Start: 2022-05-25

## 2022-05-25 RX ORDER — ATORVASTATIN CALCIUM 20 MG/1
TABLET, FILM COATED ORAL
Qty: 90 TABLET | Refills: 3 | Status: SHIPPED | OUTPATIENT
Start: 2022-05-25

## 2022-06-13 ENCOUNTER — TELEPHONE (OUTPATIENT)
Dept: NEUROLOGY | Facility: CLINIC | Age: 52
End: 2022-06-13

## 2022-06-13 NOTE — TELEPHONE ENCOUNTER
----- Message from Xavier Zhao PA-C sent at 6/13/2022  1:20 PM EDT -----  Regarding: FW: Upcoming appt    ----- Message -----  From: Sae Alamo RN  Sent: 6/13/2022   8:24 AM EDT  To: Xavier Zhao PA-C  Subject: FW: Upcoming appt                                  ----- Message -----  From: Herrera Harris  Sent: 6/13/2022  12:35 AM EDT  To: Neurology [facility] Clinical Team 5  Subject: Upcoming appt                                    Hello  I have an upcoming appt  this week for botox injections for my migraines  I missed my last appointment due to a migraine and was supposed to get a call for reschedule  I never got back on the schedule so I missed the last set of injections  I have been managing my migraines by using the Nurtec  At this time I would like to discontinue the botox injections  I would like to continue managing my migraines with the Nurtec

## 2022-06-13 NOTE — TELEPHONE ENCOUNTER
Called patient and I confirmed with her about canceling Botox appointments  I did ask her if she would like to schedule a follow up appointment instead and she agreed, but requested that it be in the [facility]  I scheduled her a follow up for 10/19/22 at 01:30pm in the [place] Location with Bianka  I kept the December Botox scheduled in case she would like to continue in the future

## 2022-06-20 ENCOUNTER — APPOINTMENT (OUTPATIENT)
Dept: LAB | Facility: HOSPITAL | Age: 52
End: 2022-06-20
Payer: COMMERCIAL

## 2022-06-20 DIAGNOSIS — E78.2 MIXED HYPERLIPIDEMIA: ICD-10-CM

## 2022-06-20 DIAGNOSIS — E55.9 VITAMIN D DEFICIENCY: ICD-10-CM

## 2022-06-20 DIAGNOSIS — I10 PRIMARY HYPERTENSION: ICD-10-CM

## 2022-06-20 LAB
25(OH)D3 SERPL-MCNC: 40.5 NG/ML (ref 30–100)
ALBUMIN SERPL BCP-MCNC: 3.9 G/DL (ref 3.5–5)
ALP SERPL-CCNC: 93 U/L (ref 34–104)
ALT SERPL W P-5'-P-CCNC: 10 U/L (ref 7–52)
ANION GAP SERPL CALCULATED.3IONS-SCNC: 10 MMOL/L (ref 4–13)
AST SERPL W P-5'-P-CCNC: 8 U/L (ref 13–39)
BASOPHILS # BLD AUTO: 0.05 THOUSANDS/ΜL (ref 0–0.1)
BASOPHILS NFR BLD AUTO: 1 % (ref 0–1)
BILIRUB SERPL-MCNC: 0.61 MG/DL (ref 0.2–1)
BUN SERPL-MCNC: 9 MG/DL (ref 5–25)
CALCIUM SERPL-MCNC: 8.6 MG/DL (ref 8.4–10.2)
CHLORIDE SERPL-SCNC: 101 MMOL/L (ref 96–108)
CHOLEST SERPL-MCNC: 161 MG/DL
CO2 SERPL-SCNC: 24 MMOL/L (ref 21–32)
CREAT SERPL-MCNC: 0.74 MG/DL (ref 0.6–1.3)
EOSINOPHIL # BLD AUTO: 0.27 THOUSAND/ΜL (ref 0–0.61)
EOSINOPHIL NFR BLD AUTO: 4 % (ref 0–6)
ERYTHROCYTE [DISTWIDTH] IN BLOOD BY AUTOMATED COUNT: 12.8 % (ref 11.6–15.1)
GFR SERPL CREATININE-BSD FRML MDRD: 94 ML/MIN/1.73SQ M
GLUCOSE P FAST SERPL-MCNC: 93 MG/DL (ref 65–99)
HCT VFR BLD AUTO: 42.9 % (ref 34.8–46.1)
HDLC SERPL-MCNC: 45 MG/DL
HGB BLD-MCNC: 14.7 G/DL (ref 11.5–15.4)
IMM GRANULOCYTES # BLD AUTO: 0.02 THOUSAND/UL (ref 0–0.2)
IMM GRANULOCYTES NFR BLD AUTO: 0 % (ref 0–2)
LDLC SERPL CALC-MCNC: 95 MG/DL (ref 0–100)
LYMPHOCYTES # BLD AUTO: 2.24 THOUSANDS/ΜL (ref 0.6–4.47)
LYMPHOCYTES NFR BLD AUTO: 29 % (ref 14–44)
MCH RBC QN AUTO: 31.1 PG (ref 26.8–34.3)
MCHC RBC AUTO-ENTMCNC: 34.3 G/DL (ref 31.4–37.4)
MCV RBC AUTO: 91 FL (ref 82–98)
MONOCYTES # BLD AUTO: 0.58 THOUSAND/ΜL (ref 0.17–1.22)
MONOCYTES NFR BLD AUTO: 7 % (ref 4–12)
NEUTROPHILS # BLD AUTO: 4.63 THOUSANDS/ΜL (ref 1.85–7.62)
NEUTS SEG NFR BLD AUTO: 59 % (ref 43–75)
NONHDLC SERPL-MCNC: 116 MG/DL
NRBC BLD AUTO-RTO: 0 /100 WBCS
PLATELET # BLD AUTO: 346 THOUSANDS/UL (ref 149–390)
PMV BLD AUTO: 8.6 FL (ref 8.9–12.7)
POTASSIUM SERPL-SCNC: 3.3 MMOL/L (ref 3.5–5.3)
PROT SERPL-MCNC: 7.3 G/DL (ref 6.4–8.4)
RBC # BLD AUTO: 4.73 MILLION/UL (ref 3.81–5.12)
SODIUM SERPL-SCNC: 135 MMOL/L (ref 135–147)
TRIGL SERPL-MCNC: 105 MG/DL
TSH SERPL DL<=0.05 MIU/L-ACNC: 2.74 UIU/ML (ref 0.45–4.5)
WBC # BLD AUTO: 7.79 THOUSAND/UL (ref 4.31–10.16)

## 2022-06-20 PROCEDURE — 84443 ASSAY THYROID STIM HORMONE: CPT

## 2022-06-20 PROCEDURE — 36415 COLL VENOUS BLD VENIPUNCTURE: CPT

## 2022-06-20 PROCEDURE — 85025 COMPLETE CBC W/AUTO DIFF WBC: CPT

## 2022-06-20 PROCEDURE — 82306 VITAMIN D 25 HYDROXY: CPT

## 2022-06-20 PROCEDURE — 80061 LIPID PANEL: CPT

## 2022-06-20 PROCEDURE — 80053 COMPREHEN METABOLIC PANEL: CPT

## 2022-06-23 ENCOUNTER — OFFICE VISIT (OUTPATIENT)
Dept: INTERNAL MEDICINE CLINIC | Facility: CLINIC | Age: 52
End: 2022-06-23
Payer: COMMERCIAL

## 2022-06-23 VITALS
WEIGHT: 210 LBS | OXYGEN SATURATION: 96 % | HEART RATE: 87 BPM | TEMPERATURE: 97.7 F | BODY MASS INDEX: 33.75 KG/M2 | HEIGHT: 66 IN | DIASTOLIC BLOOD PRESSURE: 84 MMHG | SYSTOLIC BLOOD PRESSURE: 138 MMHG

## 2022-06-23 DIAGNOSIS — J30.1 CHRONIC SEASONAL ALLERGIC RHINITIS DUE TO POLLEN: ICD-10-CM

## 2022-06-23 DIAGNOSIS — F41.9 ANXIETY: ICD-10-CM

## 2022-06-23 DIAGNOSIS — I10 PRIMARY HYPERTENSION: Primary | ICD-10-CM

## 2022-06-23 DIAGNOSIS — E78.2 MIXED HYPERLIPIDEMIA: ICD-10-CM

## 2022-06-23 DIAGNOSIS — Z12.11 SCREENING FOR COLON CANCER: ICD-10-CM

## 2022-06-23 DIAGNOSIS — E55.9 VITAMIN D DEFICIENCY: ICD-10-CM

## 2022-06-23 DIAGNOSIS — G47.00 INSOMNIA, UNSPECIFIED TYPE: ICD-10-CM

## 2022-06-23 PROCEDURE — 3725F SCREEN DEPRESSION PERFORMED: CPT | Performed by: FAMILY MEDICINE

## 2022-06-23 PROCEDURE — 3075F SYST BP GE 130 - 139MM HG: CPT | Performed by: FAMILY MEDICINE

## 2022-06-23 PROCEDURE — 99214 OFFICE O/P EST MOD 30 MIN: CPT | Performed by: FAMILY MEDICINE

## 2022-06-23 PROCEDURE — 1036F TOBACCO NON-USER: CPT | Performed by: FAMILY MEDICINE

## 2022-06-23 PROCEDURE — 3079F DIAST BP 80-89 MM HG: CPT | Performed by: FAMILY MEDICINE

## 2022-06-23 PROCEDURE — 3008F BODY MASS INDEX DOCD: CPT | Performed by: FAMILY MEDICINE

## 2022-06-23 NOTE — PROGRESS NOTES
Assessment/Plan:    No problem-specific Assessment & Plan notes found for this encounter  Diagnoses and all orders for this visit:    Primary hypertension  -     CBC and differential; Future    Chronic seasonal allergic rhinitis due to pollen  -     CBC and differential; Future    Anxiety    Mixed hyperlipidemia  -     Comprehensive metabolic panel; Future  -     Lipid panel; Future  -     TSH, 3rd generation; Future    Insomnia, unspecified type  -     CBC and differential; Future    Vitamin D deficiency  -     Vitamin D 25 hydroxy; Future    Screening for colon cancer  -     Cologuard    orders and recommendations as noted above  Recent laboratory testing reviewed with her  Blood work generally stable  Continue atorvastatin as previously  Watch diet  Increase fiber in diet  Increase activity level  Continue the Prilosec for the reflux symptoms  Blood pressure controlled  Continue with the verapamil  Watch salt intake  Continue follow-up with Neurology for the migraines  These have improved somewhat  Continue with the Ambien  Potential side effects discussed  Watch for any worsening of allergy symptoms  Continue with vitamin-D supplementation  Declines colonoscopy but is willing to do the colo guard  Continue with mammograms yearly  Recent stressors at home especially with her 's alcohol use discussed  Watch for any worsening of mood  Will have her follow up in about 6 months or sooner if needed  Subjective:      Patient ID: Delfina Yang is a 49 y o  female  She presents for routine follow-up  Has had some recent stressors  Her daughter did recently get  which was good but still stressful  Her  has been drinking more and this has affected their relationship  Continues with issues with sleep  Does not sleep well even with the Ambien  This does allow her to get at least for our Sleep    She does notice that it takes about 2 hours for the medication to be effective, however  Tolerating the wrap mi without difficulty  Denies any muscle aches or weakness  Tolerating the atorvastatin well  Denies any chest pain or palpitations  Continues with headaches but better controlled at present  His holding off on any further Botox injections for the headaches at present since the Nurtec is relatively effective at present  Appetite has been stable  She did go on a cruise which she enjoyed significantly  Plans on going on another cruise in the near future  The following portions of the patient's history were reviewed and updated as appropriate:   She  has a past medical history of Anxiety, Bruxism (teeth grinding), Endometriosis, Esophageal reflux, High cholesterol, Migraines, and Plantar fasciitis  She   Patient Active Problem List    Diagnosis Date Noted    Cellulitis, face 2022    Shortness of breath 2020    Allergic conjunctivitis of both eyes 2019    Allergic rhinitis due to house dust mite 2019    Chronic seasonal allergic rhinitis due to pollen 2019    Peripheral edema 2019    Tachycardia 2019    Hypersomnia 2018    Chronic rhinitis 2018    Obesity (BMI 30-39 9) 2018    Chondromalacia patellae 10/05/2018    Chronic migraine without aura without status migrainosus, not intractable 2018    Insomnia 2018    Vitamin D deficiency 2017    Anxiety 2017    Hyperlipidemia 2016    Nonspecific abnormal results of function study of thyroid 2016    Fatigue 2015    Hypertension 2015    Obstructive sleep apnea 2015    Cough variant asthma 2013    Chronic sinusitis 2013     She  has a past surgical history that includes  section; Foot surgery (Left); LAPAROSCOPY; [place] tooth extraction; Tooth extraction; and Bone graft  Her family history includes Abdominal aortic aneurysm in her father;  Allergies in her brother, daughter, daughter, sister, and sister; Asperger's syndrome in her brother and family; Bipolar disorder in her family and sister; Cancer in her maternal uncle; Colon cancer in her maternal grandmother; Depression in her family and mother; Diabetes in her family; Heart murmur in her mother; Hypertension in her family and father; Hypothyroidism in her sister; No Known Problems in her maternal grandfather, paternal grandfather, and paternal grandmother; Other in her mother; Raynaud syndrome in her family, sister, and sister  She was adopted  She  reports that she has never smoked  She has never used smokeless tobacco  She reports current alcohol use  She reports that she does not use drugs    Current Outpatient Medications   Medication Sig Dispense Refill    ALBUTEROL IN Inhale      atorvastatin (LIPITOR) 20 mg tablet take 1 tablet by mouth once daily 90 tablet 3    Cholecalciferol (VITAMIN D3 PO) Take by mouth      cyproheptadine (PERIACTIN) 4 mg tablet take 1 tablet by mouth at bedtime if needed for allergies 30 tablet 4    fluticasone (FLONASE) 50 mcg/act nasal spray 2 sprays into each nostril daily 16 g 11    melatonin 3 mg Take 3 mg by mouth daily at bedtime      omeprazole (PriLOSEC) 20 mg delayed release capsule Take 20 mg by mouth daily as needed       Rimegepant Sulfate (Nurtec) 75 MG TBDP Take 75 mg by mouth every other day 16 tablet 11    verapamil (CALAN-SR) 240 mg CR tablet swallow 1 tablet once daily at bedtime 90 tablet 1    zolpidem (AMBIEN) 5 mg tablet take 1 tablet by mouth at bedtime if needed for sleep 30 tablet 3    Botulinum Toxin Type A 200 units SOLR Inject up to 200 units IM into the head and neck muscles by physician every three months (Patient not taking: Reported on 6/23/2022) 200 Units 4    Olopatadine HCl 0 6 % SOLN 2 actuation into each nostril 2 (two) times a day (Patient not taking: Reported on 6/23/2022) 1 Bottle 11    prochlorperazine (COMPAZINE) 10 mg tablet Take 1 tablet (10 mg total) by mouth every 8 (eight) hours as needed for nausea or vomiting (migraine) (Patient not taking: Reported on 6/23/2022) 10 tablet 0    rizatriptan (MAXALT) 10 MG tablet Take 1 tab at onset of headache, may repeat in 2 hours, max 2 tabs in 24 hours (Patient not taking: No sig reported) 12 tablet 5     No current facility-administered medications for this visit  Current Outpatient Medications on File Prior to Visit   Medication Sig    ALBUTEROL IN Inhale    atorvastatin (LIPITOR) 20 mg tablet take 1 tablet by mouth once daily    Cholecalciferol (VITAMIN D3 PO) Take by mouth    cyproheptadine (PERIACTIN) 4 mg tablet take 1 tablet by mouth at bedtime if needed for allergies    fluticasone (FLONASE) 50 mcg/act nasal spray 2 sprays into each nostril daily    melatonin 3 mg Take 3 mg by mouth daily at bedtime    omeprazole (PriLOSEC) 20 mg delayed release capsule Take 20 mg by mouth daily as needed     Rimegepant Sulfate (Nurtec) 75 MG TBDP Take 75 mg by mouth every other day    verapamil (CALAN-SR) 240 mg CR tablet swallow 1 tablet once daily at bedtime    zolpidem (AMBIEN) 5 mg tablet take 1 tablet by mouth at bedtime if needed for sleep    Botulinum Toxin Type A 200 units SOLR Inject up to 200 units IM into the head and neck muscles by physician every three months (Patient not taking: Reported on 6/23/2022)    Olopatadine HCl 0 6 % SOLN 2 actuation into each nostril 2 (two) times a day (Patient not taking: Reported on 6/23/2022)    prochlorperazine (COMPAZINE) 10 mg tablet Take 1 tablet (10 mg total) by mouth every 8 (eight) hours as needed for nausea or vomiting (migraine) (Patient not taking: Reported on 6/23/2022)    rizatriptan (MAXALT) 10 MG tablet Take 1 tab at onset of headache, may repeat in 2 hours, max 2 tabs in 24 hours (Patient not taking: No sig reported)     No current facility-administered medications on file prior to visit       She is allergic to gabapentin       Review of Systems   Constitutional: Positive for activity change and fatigue  Negative for appetite change, chills and fever  HENT: Positive for congestion  Negative for rhinorrhea  Eyes: Negative for visual disturbance  Respiratory: Negative for cough, chest tightness and shortness of breath  Cardiovascular: Negative for chest pain and palpitations  Gastrointestinal: Negative for abdominal pain, blood in stool, diarrhea, nausea and vomiting  Endocrine: Negative for polydipsia, polyphagia and polyuria  Genitourinary: Negative for dysuria, frequency and urgency  Musculoskeletal: Positive for arthralgias  Negative for gait problem  Skin: Negative for color change  Allergic/Immunologic: Positive for environmental allergies  Neurological: Positive for headaches  Negative for dizziness  Hematological: Does not bruise/bleed easily  Psychiatric/Behavioral: Positive for sleep disturbance  Negative for confusion  The patient is nervous/anxious  Objective:      /84 (BP Location: Left arm, Patient Position: Sitting, Cuff Size: Large)   Pulse 87   Temp 97 7 °F (36 5 °C)   Ht 5' 6" (1 676 m)   Wt 95 3 kg (210 lb)   SpO2 96%   BMI 33 89 kg/m²          Physical Exam  Vitals and nursing note reviewed  Constitutional:       General: She is not in acute distress  Appearance: She is well-developed, well-groomed and overweight  HENT:      Head: Normocephalic and atraumatic  Comments: Boggy nasal mucosa with clear nasal discharge  Eyes:      General:         Right eye: No discharge  Left eye: No discharge  Conjunctiva/sclera: Conjunctivae normal       Pupils: Pupils are equal, round, and reactive to light  Cardiovascular:      Rate and Rhythm: Normal rate and regular rhythm  Heart sounds: Normal heart sounds  No murmur heard  No friction rub  No gallop  Pulmonary:      Effort: No respiratory distress  Breath sounds:  No wheezing or rales    Abdominal:      General: Bowel sounds are normal  There is no distension  Tenderness: There is no abdominal tenderness  Lymphadenopathy:      Cervical: No cervical adenopathy  Skin:     General: Skin is warm and dry  Neurological:      Mental Status: She is alert and oriented to person, place, and time  Psychiatric:         Mood and Affect: Mood and affect normal          Speech: Speech normal          Behavior: Behavior normal  Behavior is cooperative  Cognition and Memory: Cognition and memory normal            Depression Screening and Follow-up Plan: Patient was screened for depression during today's encounter  They screened negative with a PHQ-2 score of 0

## 2022-08-03 LAB — COLOGUARD RESULT REPORTABLE: NEGATIVE

## 2022-10-05 DIAGNOSIS — G47.00 INSOMNIA, UNSPECIFIED TYPE: ICD-10-CM

## 2022-10-06 ENCOUNTER — TELEPHONE (OUTPATIENT)
Dept: NEUROLOGY | Facility: CLINIC | Age: 52
End: 2022-10-06

## 2022-10-07 RX ORDER — ZOLPIDEM TARTRATE 5 MG/1
5 TABLET ORAL
Qty: 30 TABLET | Refills: 0 | Status: SHIPPED | OUTPATIENT
Start: 2022-10-07

## 2022-10-19 ENCOUNTER — OFFICE VISIT (OUTPATIENT)
Dept: NEUROLOGY | Facility: CLINIC | Age: 52
End: 2022-10-19

## 2022-10-19 VITALS
TEMPERATURE: 97.8 F | WEIGHT: 216.3 LBS | HEART RATE: 66 BPM | DIASTOLIC BLOOD PRESSURE: 68 MMHG | RESPIRATION RATE: 14 BRPM | SYSTOLIC BLOOD PRESSURE: 120 MMHG | BODY MASS INDEX: 34.91 KG/M2

## 2022-10-19 DIAGNOSIS — G43.709 CHRONIC MIGRAINE WITHOUT AURA WITHOUT STATUS MIGRAINOSUS, NOT INTRACTABLE: Primary | ICD-10-CM

## 2022-10-19 NOTE — PATIENT INSTRUCTIONS
Chronic migraine without aura without status migrainosus, not intractable  Pt does not want to continue Botox injections  She is taking Nurtec 75mg every other day  She denies any side effects of the medication  She was advised that she can take Nurtec for prevention as well  If she takes it 2 days in a row she needs to skip the next day  I have spent 30 minutes with Patient  today in which greater than 50% of this time was spent in counseling/coordination of care regarding Diagnostic results, Prognosis, Risks and benefits of tx options, Intructions for management, Patient and family education, Importance of tx compliance, Risk factor reductions and Impressions  She will follow-up in 6 months

## 2022-10-19 NOTE — ASSESSMENT & PLAN NOTE
· Pt does not want to continue Botox injections  She is taking Nurtec 75mg every other day  She denies any side effects of the medication  · She was advised that she can take Nurtec for prevention as well  If she takes it 2 days in a row she needs to skip the next day  I have spent 30 minutes with Patient  today in which greater than 50% of this time was spent in counseling/coordination of care regarding Diagnostic results, Prognosis, Risks and benefits of tx options, Intructions for management, Patient and family education, Importance of tx compliance, Risk factor reductions and Impressions  She will follow-up in 6 months

## 2022-10-19 NOTE — PROGRESS NOTES
Patient ID: Edith Phillips is a 49 y o  female  Assessment/Plan:    Chronic migraine without aura without status migrainosus, not intractable  · Pt does not want to continue Botox injections  She is taking Nurtec 75mg every other day  She denies any side effects of the medication  · She was advised that she can take Nurtec for prevention as well  If she takes it 2 days in a row she needs to skip the next day  I have spent 30 minutes with Patient  today in which greater than 50% of this time was spent in counseling/coordination of care regarding Diagnostic results, Prognosis, Risks and benefits of tx options, Intructions for management, Patient and family education, Importance of tx compliance, Risk factor reductions and Impressions  She will follow-up in 6 months  Problem List Items Addressed This Visit        Cardiovascular and Mediastinum    Chronic migraine without aura without status migrainosus, not intractable - Primary     · Pt does not want to continue Botox injections  She is taking Nurtec 75mg every other day  She denies any side effects of the medication  · She was advised that she can take Nurtec for prevention as well  If she takes it 2 days in a row she needs to skip the next day  I have spent 30 minutes with Patient  today in which greater than 50% of this time was spent in counseling/coordination of care regarding Diagnostic results, Prognosis, Risks and benefits of tx options, Intructions for management, Patient and family education, Importance of tx compliance, Risk factor reductions and Impressions  She will follow-up in 6 months  Subjective:    HPI    Edith Phillips is a 49 y o  female, with hyperlipidemia, seasonal allergies, insomnia and GERD, who follows in the office due to migraine headaches  She was getting Botox injections but was having breakthrough migraines expecially in the month prior to Botox injections being due  She was started on Nurtec   She states that she has been taking the Nurtec every other day and has not noticed any worsening of migraines  Her last Botox injections were in December of 2021  PREVENTIVE:  - verapamil 240mg   - Nurtec every other day  Prior:  - cyproheptadine   - gabapentin - side effects  - pamelor, venlafaxine, protriptyline  - zonisamide, topamax  - amlodipine  - Botox     ABORTIVE:   Current:  - Nurtec 75mg prn     Prior:  - Treximet, Maxalt   - naproxen, dexamethasone, ibuprofen, Toradol- did help break help     What is her current pain level? 5/10     How often do the headaches occur -   Mild headaches: 1-2 times a week from 2-4 times a week;  Migraines- 2-3 times per week  Has been under a lot of stress     Are you ever headache free? Yes     Aura/warning sign and how long does it last? No aura but warning sign is spike of pain in left temporal      What time of the day do the headaches start -   Mild headaches: morning  Migraine headaches: morning     How long do the headaches last?  Mild headaches: 2 hours with ibuprofen   Migraines: with nurtec 1 5-2 hours  If medication doesn't help 1/2 day to full day  Where are they located? Mild headaches: left sided  Migraine headaches: spike in left temporal region then takes over entire left side and apex  Rarely located on right side  What is the intensity of pain?   Mild headache- average pain level 4-5/10;   Migraines - 6-10/10     Describe your usual headache -   Mild headaches- dull  migraines- Pressure, throbbing at time,  sharp     Associated symptoms (migraines):   - nausea, Decrease appetite,   - photophobia, phonophobia, sensitive to smells,  -  Problem with concentration,  -  left pupil size change,   - light-headed or dizzy,   - stiff or sore neck,   - prefer to be alone and in a dark room,  -  irritable, easily frustrated      Number of days missed per month because of headaches:  Work (or school) days: a few times per month will either go home early or come in late   Social or Family activities: no     Non-Medical/Alternative Treatments used in the past for headaches: massage   Headache trigger: Fatigue, Stress/Tension, missing meals, chewing, lack of sleep, menstruation, weather     Have you used CBD or THC for your headaches and how often? No     Water intake: 40-60 oz  Caffeine intake: coffee 12-16 oz; occasional iced tea     Sleep:  Has improved with THC and Ambien  History of moderate sleep apnea, diagnosed in 2018  Does not use CPAP  The following portions of the patient's history were reviewed and updated as appropriate: allergies, current medications, past family history, past medical history, past social history, past surgical history and problem list          Objective:    Blood pressure 120/68, pulse 66, temperature 97 8 °F (36 6 °C), temperature source Temporal, resp  rate 14, weight 98 1 kg (216 lb 4 8 oz)  Physical Exam  Vitals reviewed  Eyes:      General: Lids are normal       Extraocular Movements: Extraocular movements intact  Pupils: Pupils are equal, round, and reactive to light  Neurological:      Coordination: Coordination is intact  Deep Tendon Reflexes: Strength normal and reflexes are normal and symmetric  Psychiatric:         Speech: Speech normal          Neurological Exam  Mental Status   Oriented to person, place, time and situation  Recent and remote memory are intact  Speech is normal  Language is fluent with no aphasia  Attention and concentration are normal  Fund of knowledge is appropriate for level of education  Apraxia absent  Cranial Nerves  CN II: Visual acuity is normal  Visual fields full to confrontation  CN III, IV, VI: Extraocular movements intact bilaterally  Normal lids and orbits bilaterally  Pupils equal round and reactive to light bilaterally  CN V: Facial sensation is normal   CN VII: Full and symmetric facial movement    CN VIII: Hearing is normal   CN IX, X: Palate elevates symmetrically  Normal gag reflex  CN XI: Shoulder shrug strength is normal   CN XII: Tongue midline without atrophy or fasciculations  Motor   Strength is 5/5 throughout all four extremities  Sensory  Sensation is intact to light touch, pinprick, vibration and proprioception in all four extremities  Reflexes  Deep tendon reflexes are 2+ and symmetric in all four extremities  Coordination    Finger-to-nose, rapid alternating movements and heel-to-shin normal bilaterally without dysmetria  Gait  Normal casual, toe, heel and tandem gait  ROS:    Review of Systems   Constitutional: Negative  Negative for appetite change and fever  HENT: Negative  Negative for hearing loss, tinnitus, trouble swallowing and voice change  Eyes: Negative  Negative for photophobia, pain and visual disturbance  Respiratory: Negative  Negative for shortness of breath  Cardiovascular: Negative  Negative for palpitations  Gastrointestinal: Negative  Negative for nausea and vomiting  Endocrine: Negative  Negative for cold intolerance  Genitourinary: Negative  Negative for dysuria, frequency and urgency  Musculoskeletal: Negative  Negative for gait problem, myalgias and neck pain  Skin: Negative  Negative for rash  Allergic/Immunologic: Negative  Neurological: Positive for headaches  Negative for dizziness, tremors, seizures, syncope, facial asymmetry, speech difficulty, weakness, light-headedness and numbness  Hematological: Negative  Does not bruise/bleed easily  Psychiatric/Behavioral: Negative  Negative for confusion, hallucinations and sleep disturbance  Review of systems personally reviewed

## 2022-11-10 DIAGNOSIS — G47.00 INSOMNIA, UNSPECIFIED TYPE: ICD-10-CM

## 2022-11-11 ENCOUNTER — APPOINTMENT (OUTPATIENT)
Dept: LAB | Facility: HOSPITAL | Age: 52
End: 2022-11-11

## 2022-11-11 DIAGNOSIS — I10 PRIMARY HYPERTENSION: ICD-10-CM

## 2022-11-11 DIAGNOSIS — G47.00 INSOMNIA, UNSPECIFIED TYPE: ICD-10-CM

## 2022-11-11 DIAGNOSIS — E78.2 MIXED HYPERLIPIDEMIA: ICD-10-CM

## 2022-11-11 DIAGNOSIS — E55.9 VITAMIN D DEFICIENCY: ICD-10-CM

## 2022-11-11 DIAGNOSIS — J30.1 CHRONIC SEASONAL ALLERGIC RHINITIS DUE TO POLLEN: ICD-10-CM

## 2022-11-11 LAB
25(OH)D3 SERPL-MCNC: 27.2 NG/ML (ref 30–100)
ALBUMIN SERPL BCP-MCNC: 4.4 G/DL (ref 3.5–5)
ALP SERPL-CCNC: 86 U/L (ref 34–104)
ALT SERPL W P-5'-P-CCNC: 11 U/L (ref 7–52)
ANION GAP SERPL CALCULATED.3IONS-SCNC: 8 MMOL/L (ref 4–13)
AST SERPL W P-5'-P-CCNC: 18 U/L (ref 13–39)
BASOPHILS # BLD AUTO: 0.04 THOUSANDS/ÂΜL (ref 0–0.1)
BASOPHILS NFR BLD AUTO: 1 % (ref 0–1)
BILIRUB SERPL-MCNC: 0.67 MG/DL (ref 0.2–1)
BUN SERPL-MCNC: 9 MG/DL (ref 5–25)
CALCIUM SERPL-MCNC: 9.4 MG/DL (ref 8.4–10.2)
CHLORIDE SERPL-SCNC: 101 MMOL/L (ref 96–108)
CHOLEST SERPL-MCNC: 170 MG/DL
CO2 SERPL-SCNC: 29 MMOL/L (ref 21–32)
CREAT SERPL-MCNC: 1.01 MG/DL (ref 0.6–1.3)
EOSINOPHIL # BLD AUTO: 0.23 THOUSAND/ÂΜL (ref 0–0.61)
EOSINOPHIL NFR BLD AUTO: 3 % (ref 0–6)
ERYTHROCYTE [DISTWIDTH] IN BLOOD BY AUTOMATED COUNT: 12.4 % (ref 11.6–15.1)
GFR SERPL CREATININE-BSD FRML MDRD: 65 ML/MIN/1.73SQ M
GLUCOSE P FAST SERPL-MCNC: 91 MG/DL (ref 65–99)
HCT VFR BLD AUTO: 47 % (ref 34.8–46.1)
HDLC SERPL-MCNC: 45 MG/DL
HGB BLD-MCNC: 15.7 G/DL (ref 11.5–15.4)
IMM GRANULOCYTES # BLD AUTO: 0.01 THOUSAND/UL (ref 0–0.2)
IMM GRANULOCYTES NFR BLD AUTO: 0 % (ref 0–2)
LDLC SERPL CALC-MCNC: 97 MG/DL (ref 0–100)
LYMPHOCYTES # BLD AUTO: 2.2 THOUSANDS/ÂΜL (ref 0.6–4.47)
LYMPHOCYTES NFR BLD AUTO: 32 % (ref 14–44)
MCH RBC QN AUTO: 31.1 PG (ref 26.8–34.3)
MCHC RBC AUTO-ENTMCNC: 33.4 G/DL (ref 31.4–37.4)
MCV RBC AUTO: 93 FL (ref 82–98)
MONOCYTES # BLD AUTO: 0.55 THOUSAND/ÂΜL (ref 0.17–1.22)
MONOCYTES NFR BLD AUTO: 8 % (ref 4–12)
NEUTROPHILS # BLD AUTO: 3.93 THOUSANDS/ÂΜL (ref 1.85–7.62)
NEUTS SEG NFR BLD AUTO: 56 % (ref 43–75)
NONHDLC SERPL-MCNC: 125 MG/DL
NRBC BLD AUTO-RTO: 0 /100 WBCS
PLATELET # BLD AUTO: 332 THOUSANDS/UL (ref 149–390)
PMV BLD AUTO: 8.9 FL (ref 8.9–12.7)
POTASSIUM SERPL-SCNC: 3.7 MMOL/L (ref 3.5–5.3)
PROT SERPL-MCNC: 7.9 G/DL (ref 6.4–8.4)
RBC # BLD AUTO: 5.05 MILLION/UL (ref 3.81–5.12)
SODIUM SERPL-SCNC: 138 MMOL/L (ref 135–147)
TRIGL SERPL-MCNC: 139 MG/DL
TSH SERPL DL<=0.05 MIU/L-ACNC: 2.08 UIU/ML (ref 0.45–4.5)
WBC # BLD AUTO: 6.96 THOUSAND/UL (ref 4.31–10.16)

## 2022-11-11 RX ORDER — ZOLPIDEM TARTRATE 5 MG/1
TABLET ORAL
Qty: 30 TABLET | Refills: 1 | Status: SHIPPED | OUTPATIENT
Start: 2022-11-11

## 2022-11-13 DIAGNOSIS — G43.709 CHRONIC MIGRAINE WITHOUT AURA WITHOUT STATUS MIGRAINOSUS, NOT INTRACTABLE: ICD-10-CM

## 2022-11-13 RX ORDER — VERAPAMIL HYDROCHLORIDE 240 MG/1
TABLET, FILM COATED, EXTENDED RELEASE ORAL
Qty: 90 TABLET | Refills: 1 | Status: SHIPPED | OUTPATIENT
Start: 2022-11-13

## 2022-11-16 ENCOUNTER — OFFICE VISIT (OUTPATIENT)
Dept: INTERNAL MEDICINE CLINIC | Facility: CLINIC | Age: 52
End: 2022-11-16

## 2022-11-16 VITALS
WEIGHT: 214 LBS | SYSTOLIC BLOOD PRESSURE: 122 MMHG | HEIGHT: 66 IN | BODY MASS INDEX: 34.39 KG/M2 | TEMPERATURE: 97.7 F | OXYGEN SATURATION: 96 % | HEART RATE: 88 BPM | DIASTOLIC BLOOD PRESSURE: 74 MMHG

## 2022-11-16 DIAGNOSIS — I10 PRIMARY HYPERTENSION: Primary | ICD-10-CM

## 2022-11-16 DIAGNOSIS — E78.2 MIXED HYPERLIPIDEMIA: ICD-10-CM

## 2022-11-16 DIAGNOSIS — E55.9 VITAMIN D DEFICIENCY: ICD-10-CM

## 2022-11-16 DIAGNOSIS — R53.83 FATIGUE, UNSPECIFIED TYPE: ICD-10-CM

## 2022-11-16 DIAGNOSIS — G47.00 INSOMNIA, UNSPECIFIED TYPE: ICD-10-CM

## 2022-11-16 DIAGNOSIS — J32.9 CHRONIC SINUSITIS, UNSPECIFIED LOCATION: ICD-10-CM

## 2022-11-16 DIAGNOSIS — J30.89 ALLERGIC RHINITIS DUE TO HOUSE DUST MITE: ICD-10-CM

## 2022-11-16 LAB — SL AMB POCT HEMOGLOBIN AIC: 5.2 (ref ?–6.5)

## 2022-11-16 RX ORDER — AMOXICILLIN 875 MG/1
875 TABLET, COATED ORAL 2 TIMES DAILY
Qty: 20 TABLET | Refills: 0 | Status: SHIPPED | OUTPATIENT
Start: 2022-11-16 | End: 2022-11-26

## 2022-11-16 RX ORDER — METHYLPREDNISOLONE 4 MG/1
TABLET ORAL
Qty: 21 EACH | Refills: 0 | Status: SHIPPED | OUTPATIENT
Start: 2022-11-16

## 2023-01-05 ENCOUNTER — TELEMEDICINE (OUTPATIENT)
Dept: INTERNAL MEDICINE CLINIC | Facility: CLINIC | Age: 53
End: 2023-01-05

## 2023-01-05 VITALS — TEMPERATURE: 99.9 F | OXYGEN SATURATION: 98 % | HEART RATE: 112 BPM

## 2023-01-05 DIAGNOSIS — U07.1 COVID-19: Primary | ICD-10-CM

## 2023-01-05 RX ORDER — DOXYCYCLINE 100 MG/1
100 CAPSULE ORAL 2 TIMES DAILY
Qty: 20 CAPSULE | Refills: 0 | Status: SHIPPED | OUTPATIENT
Start: 2023-01-05 | End: 2023-01-15

## 2023-01-05 RX ORDER — ALBUTEROL SULFATE 90 UG/1
2 AEROSOL, METERED RESPIRATORY (INHALATION) EVERY 6 HOURS PRN
Qty: 18 G | Refills: 1 | Status: SHIPPED | OUTPATIENT
Start: 2023-01-05 | End: 2023-02-04

## 2023-01-05 RX ORDER — PREDNISONE 10 MG/1
TABLET ORAL
Qty: 30 TABLET | Refills: 0 | Status: SHIPPED | OUTPATIENT
Start: 2023-01-05

## 2023-01-05 RX ORDER — GUAIFENESIN AND CODEINE PHOSPHATE 100; 10 MG/5ML; MG/5ML
5 SOLUTION ORAL 3 TIMES DAILY PRN
Qty: 240 ML | Refills: 0 | Status: SHIPPED | OUTPATIENT
Start: 2023-01-05

## 2023-01-05 NOTE — PROGRESS NOTES
COVID-19 Outpatient Progress Note    Assessment/Plan:    Problem List Items Addressed This Visit    None  Visit Diagnoses     COVID-19    -  Primary    Relevant Medications    doxycycline monohydrate (MONODOX) 100 mg capsule    predniSONE 10 mg tablet    guaifenesin-codeine (GUAIFENESIN AC) 100-10 MG/5ML liquid    albuterol (PROVENTIL HFA,VENTOLIN HFA) 90 mcg/act inhaler         Disposition:     Patient has asymptomatic or mild COVID-19 infection  Based off CDC guidelines, they were recommended to isolate for 5 days  If they are asymptomatic or symptoms are improving with no fevers in the past 24 hours, isolation may be ended followed by 5 days of wearing a mask when around othes to minimize risk of infecting others  If still have a fever or other symptoms have not improved, continue to isolate until they improve  Regardless of when they end isolation, avoid being around people who are more likely to get very sick from COVID-19 until at least day 11  If COVID symptoms worsen after ending isolation, restart isolation at day 0  Discussed symptom directed medication options with patient  Fluids  Rest   Tylenol or Motrin if needed  Stay adequately hydrated  Deep breathing exercises recommended  Frequent position changes recommended  Prone positioning recommended  Coronavirus testing completed  Rapid coronavirus testing completed at home yesterday and was positive  Warning signs and symptoms discussed  Follow pulse ox at home  She is essentially at the end of the effective  For the antiviral treatment  Discussed options with her  We will proceed with doxycycline, prednisone, and Robitussin with codeine as noted above  Albuterol if needed  Call or follow-up if symptoms worsen or persist     I have spent 15 minutes directly with the patient   Greater than 50% of this time was spent in counseling/coordination of care regarding: diagnostic results, prognosis, risks and benefits of treatment options, instructions for management, patient and family education, importance of treatment compliance, risk factor reductions and impressions  Encounter provider: Vianca Maynard MD     Provider located at: [address]  [address] [facility]  [id number]     Recent Visits  No visits were found meeting these conditions  Showing recent visits within past 7 days and meeting all other requirements  Today's Visits  Date Type Provider Dept   01/05/23 Blake Recinos MD  [facility]   Showing today's visits and meeting all other requirements  Future Appointments  No visits were found meeting these conditions  Showing future appointments within next 150 days and meeting all other requirements     Subjective:   Sherman Valente is a 47 y o  female who is concerned about COVID-19  Patient's symptoms include fever, chills, fatigue, malaise, nasal congestion, rhinorrhea, sore throat, cough, chest tightness, myalgias and headache  Patient denies shortness of breath, abdominal pain, nausea, vomiting and diarrhea       - Date of symptom onset: 1/1/2023      COVID-19 vaccination status: Fully vaccinated (primary series)    Exposure:   Contact with a person who is under investigation (PUI) for or who is positive for COVID-19 within the last 14 days?: No    Hospitalized recently for fever and/or lower respiratory symptoms?: No      Currently a healthcare worker that is involved in direct patient care?: No      Works in a special setting where the risk of COVID-19 transmission may be high? (this may include long-term care, correctional and senior living facilities; homeless shelters; assisted-living facilities and group homes ): No      Resident in a special setting where the risk of COVID-19 transmission may be high? (this may include long-term care, correctional and senior living facilities; homeless shelters; assisted-living facilities and group homes ): No      Lab Results   Component Value Date    SARSCOV2 Positive (A) 12/01/2021    Silvana Sea Not Detected 03/16/2020       Review of Systems   Constitutional: Positive for chills, fatigue and fever  HENT: Positive for congestion, rhinorrhea and sore throat  Respiratory: Positive for cough and chest tightness  Negative for shortness of breath  Gastrointestinal: Negative for abdominal pain, diarrhea, nausea and vomiting  Musculoskeletal: Positive for myalgias  Neurological: Positive for headaches  Current Outpatient Medications on File Prior to Visit   Medication Sig   • atorvastatin (LIPITOR) 20 mg tablet take 1 tablet by mouth once daily   • Cholecalciferol (VITAMIN D3 PO) Take by mouth   • fluticasone (FLONASE) 50 mcg/act nasal spray 2 sprays into each nostril daily   • Olopatadine HCl 0 6 % SOLN 2 actuation into each nostril 2 (two) times a day   • omeprazole (PriLOSEC) 20 mg delayed release capsule Take 20 mg by mouth daily as needed    • Rimegepant Sulfate (Nurtec) 75 MG TBDP Take 75 mg by mouth every other day   • verapamil (CALAN-SR) 240 mg CR tablet take 1 tablet by mouth once daily at bedtime   • zolpidem (AMBIEN) 5 mg tablet take 1 tablet by mouth at bedtime if needed for sleep   • [DISCONTINUED] ALBUTEROL IN Inhale   • [DISCONTINUED] methylPREDNISolone 4 MG tablet therapy pack Use as directed on package       Objective:    Pulse (!) 112   Temp 99 9 °F (37 7 °C)   SpO2 98%      Physical Exam  Vitals reviewed  Constitutional:       General: She is not in acute distress  Appearance: She is well-developed and well-groomed  She is ill-appearing  HENT:      Head:      Comments: Moist mucous membranes; boggy nasal mucosa with clear nasal discharge; posterior pharyngeal erythema; tympanic membranes bilaterally with effusions and slight erythema  Cardiovascular:      Rate and Rhythm: Regular rhythm  Tachycardia present     Pulmonary:      Breath sounds: Transmitted upper airway sounds present  Wheezing present  No decreased breath sounds  Comments: Slight expiratory wheeze; moving air well  Musculoskeletal:      Cervical back: Neck supple  Lymphadenopathy:      Cervical: No cervical adenopathy  Neurological:      Mental Status: She is alert  Psychiatric:         Behavior: Behavior is cooperative         Hima Fay MD

## 2023-01-24 DIAGNOSIS — G47.00 INSOMNIA, UNSPECIFIED TYPE: ICD-10-CM

## 2023-01-25 RX ORDER — ZOLPIDEM TARTRATE 5 MG/1
TABLET ORAL
Qty: 30 TABLET | Refills: 1 | Status: SHIPPED | OUTPATIENT
Start: 2023-01-25

## 2023-03-01 ENCOUNTER — TELEPHONE (OUTPATIENT)
Dept: NEUROLOGY | Facility: CLINIC | Age: 53
End: 2023-03-01

## 2023-03-01 NOTE — TELEPHONE ENCOUNTER
Fax received with [place] forms for patient  Scanned into media  Dropped charges and called patient to collect fee  No answer  Left message for patient to return call to the office to pay fee for form completion

## 2023-03-06 NOTE — TELEPHONE ENCOUNTER
Patient called and paid for [employer] forms  Please fax form to employer when complete and mail a copy to the patient

## 2023-04-07 ENCOUNTER — HOSPITAL ENCOUNTER (EMERGENCY)
Facility: HOSPITAL | Age: 53
End: 2023-04-07
Attending: EMERGENCY MEDICINE

## 2023-04-07 ENCOUNTER — APPOINTMENT (EMERGENCY)
Dept: CT IMAGING | Facility: HOSPITAL | Age: 53
End: 2023-04-07

## 2023-04-07 ENCOUNTER — ANESTHESIA EVENT (INPATIENT)
Dept: PERIOP | Facility: HOSPITAL | Age: 53
End: 2023-04-07

## 2023-04-07 ENCOUNTER — ANESTHESIA (INPATIENT)
Dept: PERIOP | Facility: HOSPITAL | Age: 53
End: 2023-04-07

## 2023-04-07 VITALS
HEART RATE: 77 BPM | TEMPERATURE: 98 F | BODY MASS INDEX: 32.3 KG/M2 | DIASTOLIC BLOOD PRESSURE: 72 MMHG | OXYGEN SATURATION: 96 % | WEIGHT: 201 LBS | HEIGHT: 66 IN | RESPIRATION RATE: 16 BRPM | SYSTOLIC BLOOD PRESSURE: 152 MMHG

## 2023-04-07 DIAGNOSIS — N13.30 HYDRONEPHROSIS: ICD-10-CM

## 2023-04-07 DIAGNOSIS — R52 ACUTE PAIN: ICD-10-CM

## 2023-04-07 DIAGNOSIS — D72.829 LEUKOCYTOSIS: ICD-10-CM

## 2023-04-07 DIAGNOSIS — N13.2 URETERAL STONE WITH HYDRONEPHROSIS: ICD-10-CM

## 2023-04-07 DIAGNOSIS — N23 RENAL COLIC ON LEFT SIDE: Primary | ICD-10-CM

## 2023-04-07 PROBLEM — N20.1 URETERAL CALCULUS, LEFT: Status: ACTIVE | Noted: 2023-04-07

## 2023-04-07 LAB
ALBUMIN SERPL BCP-MCNC: 4.6 G/DL (ref 3.5–5)
ALP SERPL-CCNC: 88 U/L (ref 34–104)
ALT SERPL W P-5'-P-CCNC: 12 U/L (ref 7–52)
ANION GAP SERPL CALCULATED.3IONS-SCNC: 13 MMOL/L (ref 4–13)
APTT PPP: 31 SECONDS (ref 23–37)
AST SERPL W P-5'-P-CCNC: 13 U/L (ref 13–39)
ATRIAL RATE: 68 BPM
B-HCG SERPL-ACNC: 2 MIU/ML (ref 0–11.6)
BACTERIA UR QL AUTO: NORMAL /HPF
BASOPHILS # BLD AUTO: 0.04 THOUSANDS/ÂΜL (ref 0–0.1)
BASOPHILS NFR BLD AUTO: 0 % (ref 0–1)
BILIRUB SERPL-MCNC: 0.69 MG/DL (ref 0.2–1)
BILIRUB UR QL STRIP: NEGATIVE
BUN SERPL-MCNC: 11 MG/DL (ref 5–25)
CALCIUM SERPL-MCNC: 10.1 MG/DL (ref 8.4–10.2)
CHLORIDE SERPL-SCNC: 101 MMOL/L (ref 96–108)
CLARITY UR: CLEAR
CO2 SERPL-SCNC: 24 MMOL/L (ref 21–32)
COLOR UR: ABNORMAL
CREAT SERPL-MCNC: 1.18 MG/DL (ref 0.6–1.3)
EOSINOPHIL # BLD AUTO: 0.05 THOUSAND/ÂΜL (ref 0–0.61)
EOSINOPHIL NFR BLD AUTO: 0 % (ref 0–6)
ERYTHROCYTE [DISTWIDTH] IN BLOOD BY AUTOMATED COUNT: 12.6 % (ref 11.6–15.1)
GFR SERPL CREATININE-BSD FRML MDRD: 53 ML/MIN/1.73SQ M
GLUCOSE SERPL-MCNC: 138 MG/DL (ref 65–140)
GLUCOSE UR STRIP-MCNC: NEGATIVE MG/DL
HCT VFR BLD AUTO: 45.7 % (ref 34.8–46.1)
HGB BLD-MCNC: 15.9 G/DL (ref 11.5–15.4)
HGB UR QL STRIP.AUTO: ABNORMAL
IMM GRANULOCYTES # BLD AUTO: 0.02 THOUSAND/UL (ref 0–0.2)
IMM GRANULOCYTES NFR BLD AUTO: 0 % (ref 0–2)
INR PPP: 1.05 (ref 0.84–1.19)
KETONES UR STRIP-MCNC: NEGATIVE MG/DL
LEUKOCYTE ESTERASE UR QL STRIP: NEGATIVE
LIPASE SERPL-CCNC: 11 U/L (ref 11–82)
LYMPHOCYTES # BLD AUTO: 1.74 THOUSANDS/ÂΜL (ref 0.6–4.47)
LYMPHOCYTES NFR BLD AUTO: 14 % (ref 14–44)
MCH RBC QN AUTO: 31.9 PG (ref 26.8–34.3)
MCHC RBC AUTO-ENTMCNC: 34.8 G/DL (ref 31.4–37.4)
MCV RBC AUTO: 92 FL (ref 82–98)
MONOCYTES # BLD AUTO: 1.15 THOUSAND/ÂΜL (ref 0.17–1.22)
MONOCYTES NFR BLD AUTO: 9 % (ref 4–12)
NEUTROPHILS # BLD AUTO: 9.49 THOUSANDS/ÂΜL (ref 1.85–7.62)
NEUTS SEG NFR BLD AUTO: 77 % (ref 43–75)
NITRITE UR QL STRIP: NEGATIVE
NON-SQ EPI CELLS URNS QL MICRO: NORMAL /HPF
NRBC BLD AUTO-RTO: 0 /100 WBCS
P AXIS: 51 DEGREES
PH UR STRIP.AUTO: 7.5 [PH]
PLATELET # BLD AUTO: 324 THOUSANDS/UL (ref 149–390)
PMV BLD AUTO: 9 FL (ref 8.9–12.7)
POTASSIUM SERPL-SCNC: 3 MMOL/L (ref 3.5–5.3)
PR INTERVAL: 162 MS
PROT SERPL-MCNC: 8.1 G/DL (ref 6.4–8.4)
PROT UR STRIP-MCNC: NEGATIVE MG/DL
PROTHROMBIN TIME: 13.7 SECONDS (ref 11.6–14.5)
QRS AXIS: 78 DEGREES
QRSD INTERVAL: 86 MS
QT INTERVAL: 444 MS
QTC INTERVAL: 472 MS
RBC # BLD AUTO: 4.98 MILLION/UL (ref 3.81–5.12)
RBC #/AREA URNS AUTO: NORMAL /HPF
SODIUM SERPL-SCNC: 138 MMOL/L (ref 135–147)
SP GR UR STRIP.AUTO: 1.01
T WAVE AXIS: 38 DEGREES
UROBILINOGEN UR QL STRIP.AUTO: 0.2 E.U./DL
VENTRICULAR RATE: 68 BPM
WBC # BLD AUTO: 12.49 THOUSAND/UL (ref 4.31–10.16)
WBC #/AREA URNS AUTO: NORMAL /HPF

## 2023-04-07 RX ORDER — HYDROMORPHONE HCL/PF 1 MG/ML
0.5 SYRINGE (ML) INJECTION ONCE
Status: COMPLETED | OUTPATIENT
Start: 2023-04-07 | End: 2023-04-07

## 2023-04-07 RX ORDER — POTASSIUM CHLORIDE 14.9 MG/ML
20 INJECTION INTRAVENOUS
Status: COMPLETED | OUTPATIENT
Start: 2023-04-07 | End: 2023-04-07

## 2023-04-07 RX ORDER — KETOROLAC TROMETHAMINE 30 MG/ML
15 INJECTION, SOLUTION INTRAMUSCULAR; INTRAVENOUS ONCE
Status: COMPLETED | OUTPATIENT
Start: 2023-04-07 | End: 2023-04-07

## 2023-04-07 RX ORDER — FENTANYL CITRATE 50 UG/ML
INJECTION, SOLUTION INTRAMUSCULAR; INTRAVENOUS AS NEEDED
Status: DISCONTINUED | OUTPATIENT
Start: 2023-04-07 | End: 2023-04-07

## 2023-04-07 RX ORDER — CEFAZOLIN SODIUM 2 G/50ML
SOLUTION INTRAVENOUS AS NEEDED
Status: DISCONTINUED | OUTPATIENT
Start: 2023-04-07 | End: 2023-04-07

## 2023-04-07 RX ORDER — SODIUM CHLORIDE, SODIUM LACTATE, POTASSIUM CHLORIDE, CALCIUM CHLORIDE 600; 310; 30; 20 MG/100ML; MG/100ML; MG/100ML; MG/100ML
INJECTION, SOLUTION INTRAVENOUS CONTINUOUS PRN
Status: DISCONTINUED | OUTPATIENT
Start: 2023-04-07 | End: 2023-04-07

## 2023-04-07 RX ORDER — ONDANSETRON 2 MG/ML
INJECTION INTRAMUSCULAR; INTRAVENOUS AS NEEDED
Status: DISCONTINUED | OUTPATIENT
Start: 2023-04-07 | End: 2023-04-07

## 2023-04-07 RX ORDER — LIDOCAINE HYDROCHLORIDE 10 MG/ML
INJECTION, SOLUTION EPIDURAL; INFILTRATION; INTRACAUDAL; PERINEURAL AS NEEDED
Status: DISCONTINUED | OUTPATIENT
Start: 2023-04-07 | End: 2023-04-07

## 2023-04-07 RX ORDER — PROPOFOL 10 MG/ML
INJECTION, EMULSION INTRAVENOUS AS NEEDED
Status: DISCONTINUED | OUTPATIENT
Start: 2023-04-07 | End: 2023-04-07

## 2023-04-07 RX ORDER — ONDANSETRON 2 MG/ML
4 INJECTION INTRAMUSCULAR; INTRAVENOUS ONCE
Status: COMPLETED | OUTPATIENT
Start: 2023-04-07 | End: 2023-04-07

## 2023-04-07 RX ORDER — FENTANYL CITRATE 50 UG/ML
50 INJECTION, SOLUTION INTRAMUSCULAR; INTRAVENOUS ONCE
Status: COMPLETED | OUTPATIENT
Start: 2023-04-07 | End: 2023-04-07

## 2023-04-07 RX ORDER — TAMSULOSIN HYDROCHLORIDE 0.4 MG/1
0.4 CAPSULE ORAL
Status: DISCONTINUED | OUTPATIENT
Start: 2023-04-07 | End: 2023-04-07 | Stop reason: HOSPADM

## 2023-04-07 RX ORDER — MIDAZOLAM HYDROCHLORIDE 2 MG/2ML
INJECTION, SOLUTION INTRAMUSCULAR; INTRAVENOUS AS NEEDED
Status: DISCONTINUED | OUTPATIENT
Start: 2023-04-07 | End: 2023-04-07

## 2023-04-07 RX ORDER — DEXAMETHASONE SODIUM PHOSPHATE 10 MG/ML
INJECTION, SOLUTION INTRAMUSCULAR; INTRAVENOUS AS NEEDED
Status: DISCONTINUED | OUTPATIENT
Start: 2023-04-07 | End: 2023-04-07

## 2023-04-07 RX ORDER — SUCCINYLCHOLINE/SOD CL,ISO/PF 100 MG/5ML
SYRINGE (ML) INTRAVENOUS AS NEEDED
Status: DISCONTINUED | OUTPATIENT
Start: 2023-04-07 | End: 2023-04-07

## 2023-04-07 RX ADMIN — POTASSIUM CHLORIDE 20 MEQ: 200 INJECTION, SOLUTION INTRAVENOUS at 04:23

## 2023-04-07 RX ADMIN — ONDANSETRON 4 MG: 2 INJECTION INTRAMUSCULAR; INTRAVENOUS at 02:19

## 2023-04-07 RX ADMIN — FENTANYL CITRATE 25 MCG: 50 INJECTION, SOLUTION INTRAMUSCULAR; INTRAVENOUS at 17:35

## 2023-04-07 RX ADMIN — HYDROMORPHONE HYDROCHLORIDE 0.5 MG: 1 INJECTION, SOLUTION INTRAMUSCULAR; INTRAVENOUS; SUBCUTANEOUS at 02:54

## 2023-04-07 RX ADMIN — DEXAMETHASONE SODIUM PHOSPHATE 10 MG: 10 INJECTION, SOLUTION INTRAMUSCULAR; INTRAVENOUS at 16:59

## 2023-04-07 RX ADMIN — FENTANYL CITRATE 50 MCG: 50 INJECTION INTRAMUSCULAR; INTRAVENOUS at 02:19

## 2023-04-07 RX ADMIN — SODIUM CHLORIDE, SODIUM LACTATE, POTASSIUM CHLORIDE, AND CALCIUM CHLORIDE: .6; .31; .03; .02 INJECTION, SOLUTION INTRAVENOUS at 17:20

## 2023-04-07 RX ADMIN — PROPOFOL 160 MG: 10 INJECTION, EMULSION INTRAVENOUS at 16:56

## 2023-04-07 RX ADMIN — ONDANSETRON 4 MG: 2 INJECTION INTRAMUSCULAR; INTRAVENOUS at 17:10

## 2023-04-07 RX ADMIN — CEFAZOLIN SODIUM 2000 MG: 2 SOLUTION INTRAVENOUS at 16:59

## 2023-04-07 RX ADMIN — MIDAZOLAM 2 MG: 1 INJECTION INTRAMUSCULAR; INTRAVENOUS at 16:51

## 2023-04-07 RX ADMIN — Medication 100 MG: at 16:56

## 2023-04-07 RX ADMIN — KETOROLAC TROMETHAMINE 15 MG: 30 INJECTION, SOLUTION INTRAMUSCULAR; INTRAVENOUS at 02:19

## 2023-04-07 RX ADMIN — SODIUM CHLORIDE 1000 ML: 0.9 INJECTION, SOLUTION INTRAVENOUS at 02:20

## 2023-04-07 RX ADMIN — LIDOCAINE HYDROCHLORIDE 50 MG: 10 INJECTION, SOLUTION EPIDURAL; INFILTRATION; INTRACAUDAL; PERINEURAL at 16:56

## 2023-04-07 RX ADMIN — FENTANYL CITRATE 25 MCG: 50 INJECTION, SOLUTION INTRAMUSCULAR; INTRAVENOUS at 17:28

## 2023-04-07 RX ADMIN — POTASSIUM CHLORIDE 20 MEQ: 200 INJECTION, SOLUTION INTRAVENOUS at 06:14

## 2023-04-07 RX ADMIN — TAMSULOSIN HYDROCHLORIDE 0.4 MG: 0.4 CAPSULE ORAL at 05:50

## 2023-04-07 RX ADMIN — HYDROMORPHONE HYDROCHLORIDE 0.5 MG: 1 INJECTION, SOLUTION INTRAMUSCULAR; INTRAVENOUS; SUBCUTANEOUS at 09:05

## 2023-04-07 RX ADMIN — FENTANYL CITRATE 50 MCG: 50 INJECTION, SOLUTION INTRAMUSCULAR; INTRAVENOUS at 16:56

## 2023-04-07 RX ADMIN — SODIUM CHLORIDE, SODIUM LACTATE, POTASSIUM CHLORIDE, AND CALCIUM CHLORIDE: .6; .31; .03; .02 INJECTION, SOLUTION INTRAVENOUS at 16:35

## 2023-04-07 NOTE — ANESTHESIA PREPROCEDURE EVALUATION
Procedure:  CYSTOSCOPY URETEROSCOPY WITH LITHOTRIPSY HOLMIUM LASER, RETROGRADE PYELOGRAM AND INSERTION STENT URETERAL (Left: Bladder)    Relevant Problems   CARDIO   (+) Chronic migraine without aura without status migrainosus, not intractable   (+) Hyperlipidemia   (+) Hypertension      MUSCULOSKELETAL   (+) Chondromalacia patellae      NEURO/PSYCH   (+) Anxiety   (+) Chronic migraine without aura without status migrainosus, not intractable      PULMONARY   (+) Cough variant asthma   (+) Obstructive sleep apnea   (+) Shortness of breath        LEFT VENTRICLE: Size was normal  Systolic function was normal  Ejection fraction was estimated to be 65 %  There were no regional wall motion abnormalities  Wall thickness was mildly increased  DOPPLER: Left ventricular diastolic function  parameters were normal      RIGHT VENTRICLE: The size was normal  Systolic function was normal  Wall thickness was normal      LEFT ATRIUM: Size was normal      RIGHT ATRIUM: Size was normal      MITRAL VALVE: Valve structure was normal  There was normal leaflet separation  DOPPLER: The transmitral velocity was within the normal range  There was no evidence for stenosis  There was no regurgitation      AORTIC VALVE: The valve was trileaflet  Leaflets exhibited normal thickness and normal cuspal separation  DOPPLER: Transaortic velocity was within the normal range  There was no evidence for stenosis  There was no regurgitation      TRICUSPID VALVE: The valve structure was normal  There was normal leaflet separation  DOPPLER: The transtricuspid velocity was within the normal range  There was no evidence for stenosis  There was mild regurgitation      PULMONIC VALVE: Leaflets exhibited normal thickness, no calcification, and normal cuspal separation  DOPPLER: The transpulmonic velocity was within the normal range  There was no regurgitation      PERICARDIUM: There was no pericardial effusion   The pericardium was normal in appearance      AORTA: The root exhibited normal size      SYSTEMIC VEINS: IVC: The inferior vena cava was normal in size and course  Respirophasic changes were normal     Component Ref Range & Units 4/7/23 0218 11/11/22 1358 6/20/22 1432 12/16/21 0836 10/13/18 1025 10/13/18 1025 10/13/18 10/13/18 10/13/18 10/13/18   Sodium 135 - 147 mmol/L 138  138  135  141 R     139 R   139 R    Potassium 3 5 - 5 3 mmol/L 3 0 Low   3 7  3 3 Low   3 4 Low      3 9   3 9    Chloride 96 - 108 mmol/L 101  101  101  108 R          CO2 21 - 32 mmol/L 24  29  24  27          ANION GAP 4 - 13 mmol/L 13  8  10  6          BUN 5 - 25 mg/dL 11  9  9  7   9 R   9   9    Creatinine 0 60 - 1 30 mg/dL 1 18  1 01 CM  0 74 CM  0 83 CM  0 86 R    0 84   0 84    Comment: Standardized to IDMS reference method   Glucose 65 - 140 mg/dL 138        96 R   96 R    Comment: If the patient is fasting, the ADA then defines impaired fasting glucose as > 100 mg/dL and diabetes as > or equal to 123 mg/dL  Calcium 8 4 - 10 2 mg/dL 10 1  9 4  8 6  8 9 R          AST 13 - 39 U/L 13  18 CM  8 Low  CM  11 R, CM    11 R   11 R     ALT 7 - 52 U/L 12  11 CM  10 CM  17 R, CM    10 Abnormal  R   10 Abnormal  R     Comment: Specimen collection should occur prior to Sulfasalazine administration due to the potential for falsely depressed results      Alkaline Phosphatase 34 - 104 U/L 88  86  93  89 R    71 R   71 R     Total Protein 6 4 - 8 4 g/dL 8 1  7 9  7 3  6 9 R          Albumin 3 5 - 5 0 g/dL 4 6  4 4  3 9  3 2 Low           Total Bilirubin 0 20 - 1 00 mg/dL 0 69  0 67  0 61  0 56 CM    0 3 R   0 3 R       Component Ref Range & Units 4/7/23 0218 11/11/22 1358 6/20/22 1432 12/16/21 0836 10/13/18 10/13/18 12/9/17 1027   WBC 4 31 - 10 16 Thousand/uL 12 49 High   6 96  7 79  10 12  5 8 R  5 8 R  6 4 R    RBC 3 81 - 5 12 Million/uL 4 98  5 05  4 73  4 75    4 56 R    Hemoglobin 11 5 - 15 4 g/dL 15 9 High   15 7 High   14 7  14 7  12 2  12 2  13 0 R    Hematocrit 34 8 - 46 1 % 45 7  47 0 High   42 9  44 3  36 3  36 3  38 3 R    MCV 82 - 98 fL 92  93  91  93    84 0 R    MCH 26 8 - 34 3 pg 31 9  31 1  31 1  30 9    28 5 R    MCHC 31 4 - 37 4 g/dL 34 8  33 4  34 3  33 2    33 9 R    RDW 11 6 - 15 1 % 12 6  12 4  12 8  13 9    13 8 R    MPV 8 9 - 12 7 fL 9 0  8 9  8 6 Low   10 0    10 0 R, CM    Platelets 240 - 213 Thousands/uL 324  332  346  375  372  372  369 R          Physical Exam    Airway    Mallampati score: II  TM Distance: >3 FB  Neck ROM: full     Dental       Cardiovascular      Pulmonary      Other Findings        Anesthesia Plan  ASA Score- 2     Anesthesia Type- general with ASA Monitors  Additional Monitors:   Airway Plan: ETT and LMA  Plan Factors-    Chart reviewed  Existing labs reviewed  Patient summary reviewed  Patient is not a current smoker  Patient did not smoke on day of surgery  Induction- intravenous  Postoperative Plan- Plan for postoperative opioid use  Planned trial extubation    Informed Consent- Anesthetic plan and risks discussed with patient  I personally reviewed this patient with the CRNA  Discussed and agreed on the Anesthesia Plan with the CRNA  Libra Longo

## 2023-04-07 NOTE — ED PROVIDER NOTES
History  Chief Complaint   Patient presents with   • Abdominal Pain   • Flank Pain     4th day of left flank pain  Polyuria per pt  Nausea noted as well  No hx of stones  Hot and cold at home  Poor appetite today  Sudden onset of pain 4 days ago per pt  HPI  This is a very pleasant, nontoxic-appearing, 72-year-old female presents emergency department with a chief complaint of left flank pain  Patient is a reliable competent story and who arrived via private vehicle  Patient reports that this left flank pain has been going on for approximately 4 days, associated with nausea, increased frequency of urination with no onset of fever or chills  Patient denies prior history of kidney stones  Prior  and cervical ablation  Vomited prior to come to the emergency department proximately 1 hour ago  Patient has a past medical history of GERD, hyperlipidemia, chronic migraines, hypertension, insomnia  Prior to Admission Medications   Prescriptions Last Dose Informant Patient Reported? Taking?    Cholecalciferol (VITAMIN D3 PO)   Yes No   Sig: Take by mouth   Olopatadine HCl 0 6 % SOLN   No No   Si actuation into each nostril 2 (two) times a day   Rimegepant Sulfate (Nurtec) 75 MG TBDP   No No   Sig: Take 75 mg by mouth every other day   atorvastatin (LIPITOR) 20 mg tablet   No No   Sig: take 1 tablet by mouth once daily   fluticasone (FLONASE) 50 mcg/act nasal spray   No No   Si sprays into each nostril daily   guaifenesin-codeine (GUAIFENESIN AC) 100-10 MG/5ML liquid   No No   Sig: Take 5 mL by mouth 3 (three) times a day as needed for cough   omeprazole (PriLOSEC) 20 mg delayed release capsule   Yes No   Sig: Take 20 mg by mouth daily as needed    predniSONE 10 mg tablet   No No   Sig: Five pills a day for 2 days, 4 pills a day for 2 days, 3 pills a day for 2 days, 2 pills a day for 2 days, 1 pill a day for 2 days   verapamil (CALAN-SR) 240 mg CR tablet   No No   Sig: take 1 tablet by mouth once daily at bedtime   zolpidem (AMBIEN) 5 mg tablet   No No   Sig: take 1 tablet by mouth at bedtime if needed for sleep      Facility-Administered Medications: None       Past Medical History:   Diagnosis Date   • Anxiety    • Bruxism (teeth grinding)    • Endometriosis    • Esophageal reflux    • High cholesterol    • Migraines    • Plantar fasciitis        Past Surgical History:   Procedure Laterality Date   • BONE GRAFT      L jaw for future false tooth implant   •  SECTION     • FOOT SURGERY Left    • LAPAROSCOPY      of uterus   • TOOTH EXTRACTION      3 besides wisdom teeth   • WISDOM TOOTH EXTRACTION      2 of teeth       Family History   Adopted: Yes   Problem Relation Age of Onset   • Heart murmur Mother    • Depression Mother    • Other Mother         C-spine injury   • Hypertension Father    • Abdominal aortic aneurysm Father    • Colon cancer Maternal Grandmother    • No Known Problems Maternal Grandfather    • No Known Problems Paternal Grandmother    • No Known Problems Paternal Grandfather    • Asperger's syndrome Family         disorder   • Diabetes Family         DM   • Bipolar disorder Family    • Depression Family    • Hypertension Family    • Raynaud syndrome Family         phenomenon   • Cancer Maternal Uncle    • Allergies Daughter         Environmental   • Allergies Daughter         Environmental   • Raynaud syndrome Sister    • Allergies Sister    • Hypothyroidism Sister    • Asperger's syndrome Brother    • Allergies Brother    • Raynaud syndrome Sister    • Bipolar disorder Sister    • Allergies Sister      I have reviewed and agree with the history as documented      E-Cigarette/Vaping   • E-Cigarette Use Never User      E-Cigarette/Vaping Substances     Social History     Tobacco Use   • Smoking status: Never   • Smokeless tobacco: Never   Vaping Use   • Vaping Use: Never used   Substance Use Topics   • Alcohol use: Yes     Comment: social   • Drug use: Never       Review of Systems Constitutional: Negative  HENT: Negative  Eyes: Negative  Respiratory: Negative for chest tightness  Cardiovascular: Negative for chest pain and palpitations  Gastrointestinal: Positive for abdominal pain and vomiting  Negative for diarrhea  Endocrine: Negative  Genitourinary: Negative  Musculoskeletal: Negative  Skin: Negative  Allergic/Immunologic: Negative  Neurological: Negative  Hematological: Negative  Psychiatric/Behavioral: Negative  Physical Exam  Physical Exam  Vitals and nursing note reviewed  Constitutional:       General: She is in acute distress  Appearance: She is well-developed  She is ill-appearing and diaphoretic  HENT:      Head: Normocephalic and atraumatic  Mouth/Throat:      Mouth: Mucous membranes are moist       Pharynx: Oropharynx is clear  Eyes:      Extraocular Movements: Extraocular movements intact  Pupils: Pupils are equal, round, and reactive to light  Cardiovascular:      Rate and Rhythm: Normal rate and regular rhythm  Heart sounds: Normal heart sounds  Pulmonary:      Effort: Pulmonary effort is normal       Breath sounds: Normal breath sounds  Abdominal:      General: Abdomen is flat  Bowel sounds are normal       Palpations: Abdomen is soft  Tenderness: There is abdominal tenderness in the left lower quadrant  There is left CVA tenderness  Hernia: No hernia is present  Skin:     General: Skin is warm  Capillary Refill: Capillary refill takes less than 2 seconds  Neurological:      General: No focal deficit present  Mental Status: She is alert and oriented to person, place, and time     Psychiatric:         Mood and Affect: Mood normal          Behavior: Behavior normal          Vital Signs  ED Triage Vitals   Temperature Pulse Respirations Blood Pressure SpO2   04/07/23 0156 04/07/23 0147 04/07/23 0147 04/07/23 0147 04/07/23 0147   98 3 °F (36 8 °C) (!) 106 19 (!) 174/97 95 % Temp Source Heart Rate Source Patient Position - Orthostatic VS BP Location FiO2 (%)   04/07/23 0156 -- -- -- --   Tympanic          Pain Score       04/07/23 0147       8           Vitals:    04/07/23 0302 04/07/23 0332 04/07/23 0422 04/07/23 0432   BP: 166/85 155/84 153/83    Pulse: 83 80 73 78         Visual Acuity      ED Medications  Medications   potassium chloride 20 mEq IVPB (premix) (20 mEq Intravenous New Bag 4/7/23 0423)   sodium chloride 0 9 % bolus 1,000 mL (0 mL Intravenous Stopped 4/7/23 0320)   ondansetron (ZOFRAN) injection 4 mg (4 mg Intravenous Given 4/7/23 0219)   fentanyl citrate (PF) 100 MCG/2ML 50 mcg (50 mcg Intravenous Given 4/7/23 0219)   ketorolac (TORADOL) injection 15 mg (15 mg Intravenous Given 4/7/23 0219)   HYDROmorphone (DILAUDID) injection 0 5 mg (0 5 mg Intravenous Given 4/7/23 0254)       Diagnostic Studies  Results Reviewed     Procedure Component Value Units Date/Time    UA w Reflex to Microscopic w Reflex to Culture [[id number]]  (Abnormal) Collected: 04/07/23 0424    Lab Status: Final result Specimen: Urine, Clean Catch Updated: 04/07/23 0436     Color, UA Straw     Clarity, UA Clear     Specific Gravity, UA 1 010     pH, UA 7 5     Leukocytes, UA Negative     Nitrite, UA Negative     Protein, UA Negative mg/dl      Glucose, UA Negative mg/dl      Ketones, UA Negative mg/dl      Urobilinogen, UA 0 2 E U /dl      Bilirubin, UA Negative     Occult Blood, UA Trace-Intact    Urine Microscopic [[id number]] Collected: 04/07/23 0424    Lab Status:  In process Specimen: Urine, Clean Catch Updated: 04/07/23 0436    Quantitative hCG [[id number]]  (Normal) Collected: 04/07/23 0218    Lab Status: Final result Specimen: Blood from Arm, Left Updated: 04/07/23 0303     HCG, Quant 2 mIU/mL     Narrative:       Expected Ranges:     Approximate               Approximate HCG  Gestation age          Concentration ( mIU/mL)  _____________          ______________________   Laz Cota HCG values  0 2-1                       5-50  1-2                           2-3                         100-5000  3-4                         500-53897  4-5                         1000-52022  5-6                         59884-520642  6-8                         [id number]  8-12                        [id number]      Comprehensive metabolic panel [[id number]]  (Abnormal) Collected: 04/07/23 0218    Lab Status: Final result Specimen: Blood from Arm, Left Updated: 04/07/23 0250     Sodium 138 mmol/L      Potassium 3 0 mmol/L      Chloride 101 mmol/L      CO2 24 mmol/L      ANION GAP 13 mmol/L      BUN 11 mg/dL      Creatinine 1 18 mg/dL      Glucose 138 mg/dL      Calcium 10 1 mg/dL      AST 13 U/L      ALT 12 U/L      Alkaline Phosphatase 88 U/L      Total Protein 8 1 g/dL      Albumin 4 6 g/dL      Total Bilirubin 0 69 mg/dL      eGFR 53 ml/min/1 73sq m     Narrative:      [place] guidelines for Chronic Kidney Disease (CKD):   •  Stage 1 with normal or high GFR (GFR > 90 mL/min/1 73 square meters)  •  Stage 2 Mild CKD (GFR = 60-89 mL/min/1 73 square meters)  •  Stage 3A Moderate CKD (GFR = 45-59 mL/min/1 73 square meters)  •  Stage 3B Moderate CKD (GFR = 30-44 mL/min/1 73 square meters)  •  Stage 4 Severe CKD (GFR = 15-29 mL/min/1 73 square meters)  •  Stage 5 End Stage CKD (GFR <15 mL/min/1 73 square meters)  Note: GFR calculation is accurate only with a steady state creatinine    Lipase [[id number]]  (Normal) Collected: 04/07/23 0218    Lab Status: Final result Specimen: Blood from Arm, Left Updated: 04/07/23 0250     Lipase 11 u/L     Protime-INR [[id number]]  (Normal) Collected: 04/07/23 0218    Lab Status: Final result Specimen: Blood from Arm, Left Updated: 04/07/23 0242     Protime 13 7 seconds      INR 1 05    APTT [[id number]]  (Normal) Collected: 04/07/23 0218    Lab Status: Final result Specimen: Blood from Arm, Left Updated: 04/07/23 0242     PTT 31 seconds     CBC and differential [[id number]]  (Abnormal) Collected: 04/07/23 0218    Lab Status: Final result Specimen: Blood from Arm, Left Updated: 04/07/23 0226     WBC 12 49 Thousand/uL      RBC 4 98 Million/uL      Hemoglobin 15 9 g/dL      Hematocrit 45 7 %      MCV 92 fL      MCH 31 9 pg      MCHC 34 8 g/dL      RDW 12 6 %      MPV 9 0 fL      Platelets 921 Thousands/uL      nRBC 0 /100 WBCs      Neutrophils Relative 77 %      Immat GRANS % 0 %      Lymphocytes Relative 14 %      Monocytes Relative 9 %      Eosinophils Relative 0 %      Basophils Relative 0 %      Neutrophils Absolute 9 49 Thousands/µL      Immature Grans Absolute 0 02 Thousand/uL      Lymphocytes Absolute 1 74 Thousands/µL      Monocytes Absolute 1 15 Thousand/µL      Eosinophils Absolute 0 05 Thousand/µL      Basophils Absolute 0 04 Thousands/µL                  CT renal stone study abdomen pelvis without contrast   Final Result by Odette Kebede MD (04/07 0240)      6 mm obstructing calculus left proximal ureter with mild upstream hydroureteronephrosis and perinephric stranding  Colonic diverticulosis  Workstation performed: [id number]                    Procedures  ECG 12 Lead Documentation Only    Date/Time: 4/7/2023 4:20 AM  Performed by: Sammy Brito DO  Authorized by: Mojgan Brown III, DO     Indications / Diagnosis:  Low potassium, flank pain  ECG reviewed by me, the ED Provider: yes    Patient location:  ED  Comments:      I personally reviewed this EKG that was performed with the patient April 7, 2023, EKG was completed at 4:20 AM interpreted by me at the same time, normal sinus rhythm with a ventricular rate of 68 bpm, remaining portion of intervals within normal limits  No diffuse elevations to indicate pericarditis  No coved ST elevations greater than 2mm with negative T waves in V1-3 to indicate concern for brugada  No biphasic T waves in V2, V3 to indicate Wellens (critical stenosis of LAD)     No elevation in aVR or deviation when compared to V1 (can be associated with ST depression in I,II, V4-6 when left main occlusion is present)  CriticalCare Time    Date/Time: 4/7/2023 4:25 AM  Performed by: Timmy Mills DO  Authorized by: Danny Harrell III, DO     Critical care provider statement:     Critical care time (minutes):  35    Critical care start time:  4/7/2023 4:24 AM    Critical care end time:  4/7/2023 5:00 AM    Critical care was necessary to treat or prevent imminent or life-threatening deterioration of the following conditions:  Metabolic crisis and dehydration    Critical care was time spent personally by me on the following activities:  Obtaining history from patient or surrogate, development of treatment plan with patient or surrogate, discussions with consultants, evaluation of patient's response to treatment, examination of patient, review of old charts, re-evaluation of patient's condition, ordering and review of radiographic studies, ordering and review of laboratory studies and ordering and performing treatments and interventions             ED Course  ED Course as of 04/07/23 0439   Fri Apr 07, 2023   0202 Patient seen and evaluated, orders placed  Or day history of left flank pain with polyuria, no fever, no chills, no shortness of breath, no chest pain, no rash, no trauma  Diff diagnosis this patient is as follows: Renal colic versus pyelonephritis vs  Lower tract UTI    0220 Patient to CT imaging  0246 Reviewed CT imaging, patient is having significant mount of increased pain, will medicate  She has a 3 mm stone with significant hydronephrosis, will consult urology  0702 Urology contacted via tiger text     6180 Case reviewed with Migel Mirza PA-C, who is covering urology service, noted that there is limited average for urology at this [place] currently, is not aware when Dr Ang Miranda will be back on campus, patient will need to be transferred to [facility] "to the hospitalist service, reviewed with patient at the bedside need for transfer  \"I agree she's appropriate for inpt management  We can manage her surgically at [facility]  She can be transferred to internal medicine service at [place]  I have no confirmation that suggest Dr Miguel Hansen is actually on campus tomorrow at [facility] so best to play it safe since I am in this role  Usually that means he is away and dedicated it to me  \"    [id number] PACS referral made  0300 Received communication from urology: David Choe, LOVE:  \"Please make her NPO  Discuss with the transfer center have [facility] take her as primary  We will see her in consult and set up OR for her tomorrow afternoon once she arrives in [place]  \"   [id number] D/w PACS: Dr Cody Avila accepted pt to [facility]    9043 Transfer papers / EMTALA forms signed, patient and  updated  7101 PACS noted no bed currently available at [facility]    0410 K low, will replace  Medical Decision Making  4-day history of left flank pain, colicky in nature, found to have a 6 mm stone with hydronephrosis, significant pain quiring multiple doses of narcotic level pain medicine, discussed with urology, due to lack of coverage this site, patient be transferred to [facility] for further evaluation and possible intervention, calcium noted to be low at 3 0 replaced via IV route, n p o  ordered at the request of urology, patient be transferred to [facility]  Portions of the record may have been created with voice recognition software  Occasional wrong word or \"sound a like\" substitutions may have occurred due to the inherent limitations of voice recognition software  Read the chart carefully and recognize, using context, where substitutions have occurred  Amount and/or Complexity of Data Reviewed  Labs: ordered  Radiology: ordered  Risk  Prescription drug management            Disposition  Final diagnoses: " Renal colic on left side   Hydronephrosis   Leukocytosis   Acute pain     Time reflects when diagnosis was documented in both MDM as applicable and the Disposition within this note     Time User Action Codes Description Comment    4/7/2023  3:05 AM Kristeen Abdulaziz Add [O86] Renal colic on left side     4/7/2023  3:05 AM Kristeen Abdulaziz Add [N13 30] Hydronephrosis     4/7/2023  4:39 AM Kristeen Abdulaziz Add [D72 829] Leukocytosis     4/7/2023  4:39 AM Kristeen Abdulaziz Add [R52] Acute pain       ED Disposition     ED Disposition   Transfer to Another Facility-In Network    Condition   --    Date/Time   Fri Apr 7, 2023  2:59 AM    Comment   Mya Gale should be transferred out to B  Follow-up Information    None         Patient's Medications   Discharge Prescriptions    No medications on file       No discharge procedures on file      PDMP Review       Value Time User    PDMP Reviewed  Yes 1/25/2023  7:02 AM Carlito Larson MD          ED Provider  Electronically Signed by           Eros Rausch III, DO  04/07/23 7781
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
## 2023-04-07 NOTE — ANESTHESIA POSTPROCEDURE EVALUATION
Post-Op Assessment Note    CV Status:  Stable  Pain Score: 0    Pain management: adequate     Mental Status:  Alert and awake   Hydration Status:  Euvolemic   PONV Controlled:  Controlled   Airway Patency:  Patent      Post Op Vitals Reviewed: Yes      Staff: CRNA         No notable events documented      /92 (04/07/23 1733)    Temp 98 1 °F (36 7 °C) (04/07/23 1733)    Pulse 94 (04/07/23 1733)   Resp 16 (04/07/23 1733)    SpO2   100% 6LO2 mask

## 2023-04-08 NOTE — H&P (VIEW-ONLY)
[facility]  H&P  Name: Massimo Gonzalez  MRN: [id number]  [facility] Date of Admission: 4/7/2023   Date of Service: 4/7/2023 I Hospital Day: 0      Assessment/Plan   * Ureteral calculus, left  Assessment & Plan  · Presented initially to [facility] with 4-day history of left flank pain with nausea, found with obstructing 6 mm left proximal ureteral stone causing mild upstream hydroureteronephrosis and perinephric stranding  · Transferred here for urology intervention, POD #0 cystoscopy retrograde pyelogram and insertion of left ureteral stent, noted with turbid urine on placement concerning for upstream infection  · In this setting patient admitted overnight for further monitoring  Fortunately she is hemodynamically stable and nontoxic-appearing at this time  · Continue antibiotics with ceftriaxone while inpatient and follow-up urine culture  · Continue pain and nausea control regimen  · If remains afebrile and pain controlled hopefully can discharge 4/8/2023 on oral antibiotics if okay with urology  Eventually will need outpatient ureteroscopy per urology once antibiotic course is completed    Obstructive sleep apnea  Assessment & Plan  · Not currently on CPAP due to intolerance, advised outpatient follow-up with PCP/sleep medicine    Hypertension  Assessment & Plan  · Blood pressure elevated on arrival to floor, patient admits she has not taken antihypertensive for the past 2 days due to degree of pain  · Restart home verapamil tonight and assess response, pain regimen on board as above  Monitor blood pressure per protocol           VTE Prophylaxis: Enoxaparin (Lovenox)  / sequential compression device   Code Status: Level 1 - Full Code  POLST: POLST form is not discussed and not completed at this time    Discussion with family: Multiple family members including  and daughter at bedside    Anticipated Length of Stay:  Patient will be admitted on an Inpatient basis with an anticipated length of stay of greater than 2 midnights  Justification for Hospital Stay: Please see detailed plans noted above  Chief Complaint:     Left flank pain  History of Present Illness:  Negin Navarro is a 47 y o  female who presented to the [facility] ED the early morning of 4/7/2023 with complaints of left sided flank pain ongoing for approximately 4 days associated with nausea and increased urinary frequency, worsening during this span but without reported fevers/chills or other systemic complaints  Work-up during the ED evaluation revealed an obstructing 6 mm left proximal ureteral stone causing hydroureteronephrosis additionally with concerns for perinephric stranding  She was transferred to this hospital for Urology intervention  On arrival here she was taken to the OR where she underwent a cystoscopy retrograde pyelogram with left ureteral stent insertion with urology, with operative findings significant for thick turbid urine following the placement of wire alongside stone concerning for upper urinary tract infection for which a left ureteral stent was placed without complication  Given the concern for infection she is admitted under medicine service with plans for antibiotics and overnight observation  Currently, patient is lying in bed in no acute distress and comfortable appearing, although notes ongoing urinary urgency/frequency since stent placement  States her left side flank abdominal pain is controlled and denies any fever/chills, nausea/vomiting, or other systemic symptoms      Review of Systems:    Constitutional:  Denies fever or chills   Eyes:  Denies change in visual acuity   HENT:  Denies nasal congestion or sore throat   Respiratory:  Denies cough or shortness of breath   Cardiovascular:  Denies chest pain or edema   GI:  Denies abdominal pain, nausea, vomiting, bloody stools or diarrhea   :  Denies dysuria but reports urinary urgency/frequency  Musculoskeletal:  Denies back pain or joint pain   Integument:  Denies rash   Neurologic:  Denies headache, focal weakness or sensory changes   Endocrine:  Denies polyuria or polydipsia   Lymphatic:  Denies swollen glands   Psychiatric:  Denies depression or anxiety     Past Medical and Surgical History:   Past Medical History:   Diagnosis Date    Anxiety     Bruxism (teeth grinding)     Endometriosis     Esophageal reflux     High cholesterol     Migraines     Plantar fasciitis      Past Surgical History:   Procedure Laterality Date    BONE GRAFT      L jaw for future false tooth implant     SECTION      FOOT SURGERY Left     LAPAROSCOPY      of uterus    TOOTH EXTRACTION      3 besides wisdom teeth    WISDOM TOOTH EXTRACTION      2 of teeth       Meds/Allergies:  Medications Prior to Admission   Medication    atorvastatin (LIPITOR) 20 mg tablet    Cholecalciferol (VITAMIN D3 PO)    fluticasone (FLONASE) 50 mcg/act nasal spray    guaifenesin-codeine (GUAIFENESIN AC) 100-10 MG/5ML liquid    Olopatadine HCl 0 6 % SOLN    omeprazole (PriLOSEC) 20 mg delayed release capsule    predniSONE 10 mg tablet    Rimegepant Sulfate (Nurtec) 75 MG TBDP    verapamil (CALAN-SR) 240 mg CR tablet    zolpidem (AMBIEN) 5 mg tablet       Allergies:    Allergies   Allergen Reactions    Gabapentin Swelling     History:  Marital Status: /Civil Union     Substance Use History:   Social History     Substance and Sexual Activity   Alcohol Use Yes    Comment: social     Social History     Tobacco Use   Smoking Status Never   Smokeless Tobacco Never     Social History     Substance and Sexual Activity   Drug Use Never       Family History:  Family History   Adopted: Yes   Problem Relation Age of Onset    Heart murmur Mother     Depression Mother     Other Mother         C-spine injury    Hypertension Father     Abdominal aortic aneurysm Father     Colon cancer Maternal Grandmother  No Known Problems Maternal Grandfather     No Known Problems Paternal Grandmother     No Known Problems Paternal Grandfather     Asperger's syndrome Family         disorder    Diabetes Family         DM    Bipolar disorder Family     Depression Family     Hypertension Family     Raynaud syndrome Family         phenomenon    Cancer Maternal Uncle     Allergies Daughter         Environmental    Allergies Daughter         Environmental    Raynaud syndrome Sister     Allergies Sister     Hypothyroidism Sister     Asperger's syndrome Brother     Allergies Brother     Raynaud syndrome Sister     Bipolar disorder Sister     Allergies Sister        Physical Exam:     Vitals:   Blood Pressure: (!) 171/112 (04/07/23 2031)  Pulse: 92 (04/07/23 2031)  Temperature: 98 °F (36 7 °C) (04/07/23 1830)  Respirations: 13 (04/07/23 1900)  SpO2: 90 % (04/07/23 2031)    Constitutional:  Well developed, well nourished, no acute distress, non-toxic appearance   Eyes:  PERRL, conjunctiva normal   HENT:  Atraumatic, external ears normal, nose normal, oropharynx moist, no pharyngeal exudates  Neck- normal range of motion, no tenderness, supple   Respiratory:  No respiratory distress, normal breath sounds, no rales, no wheezing   Cardiovascular:  Normal rate, normal rhythm, no murmurs, no gallops, no rubs   GI:  Soft, nondistended, normal bowel sounds, nontender, no organomegaly, no mass, no rebound, no guarding   :  No costovertebral angle tenderness   Musculoskeletal:  No edema, no tenderness, no deformities  Back- no tenderness  Integument:  Well hydrated, no rash   Lymphatic:  No lymphadenopathy noted   Neurologic:  Alert &awake, communicative, CN 2-12 normal, normal motor function, normal sensory function, no focal deficits noted   Psychiatric:  Speech and behavior appropriate       Lab Results: I have personally reviewed pertinent reports        Results from last 7 days   Lab Units 04/07/23  0218   WBC Thousand/uL 12 49*   HEMOGLOBIN g/dL 15 9*   HEMATOCRIT % 45 7   PLATELETS Thousands/uL 324   NEUTROS PCT % 77*   LYMPHS PCT % 14   MONOS PCT % 9   EOS PCT % 0     Results from last 7 days   Lab Units 04/07/23  0218   POTASSIUM mmol/L 3 0*   CHLORIDE mmol/L 101   CO2 mmol/L 24   BUN mg/dL 11   CREATININE mg/dL 1 18   CALCIUM mg/dL 10 1   ALK PHOS U/L 88   ALT U/L 12   AST U/L 13     Results from last 7 days   Lab Units 04/07/23  0218   INR  1 05       Imaging: I have personally reviewed pertinent reports  CT renal stone study abdomen pelvis without contrast    Result Date: 4/7/2023  Narrative: CT ABDOMEN AND PELVIS WITHOUT IV CONTRAST - LOW DOSE RENAL STONE INDICATION:   Flank pain, kidney stone suspected L flank pain  COMPARISON:  None  TECHNIQUE:  Low radiation dose thin section CT examination of the abdomen and pelvis was performed without intravenous or oral contrast according to a protocol specifically designed to evaluate for urinary tract calculus  Axial, sagittal, and coronal 2D  reformatted images were created from the source data and submitted for interpretation  Evaluation for pathology in the abdomen and pelvis that is unrelated to urinary tract calculi is limited  Radiation dose length product (DLP) for this visit:  372 22 mGy-cm   This examination, like all CT scans performed in the [facility], was performed utilizing techniques to minimize radiation dose exposure, including the use of iterative  reconstruction and automated exposure control  URINARY TRACT FINDINGS: RIGHT KIDNEY AND URETER:  No urinary tract calculi  No hydronephrosis or hydroureter  LEFT KIDNEY AND URETER:  6 mm obstructing calculus left proximal ureter with mild upstream hydroureteronephrosis and perinephric stranding  URINARY BLADDER:  Poorly distended/evaluated, grossly unremarkable  ADDITIONAL FINDINGS: LOWER CHEST:  Probable atelectatic changes at the visualized lung bases      SOLID VISCERA: Limited low radiation dose noncontrast CT evaluation demonstrates no clinically significant abnormality of the imaged portions of the liver, spleen, pancreas, or adrenal glands  GALLBLADDER/BILIARY TREE:  No calcified gallstones  No pericholecystic inflammatory change  No biliary dilatation  STOMACH AND BOWEL:  There is colonic diverticulosis without evidence of acute diverticulitis  APPENDIX:  No findings to suggest appendicitis  ABDOMINOPELVIC CAVITY:  No ascites  No pneumoperitoneum  No lymphadenopathy  REPRODUCTIVE ORGANS:  Unremarkable for patient's age  ABDOMINAL WALL/INGUINAL REGIONS:  Unremarkable  OSSEOUS STRUCTURES:  No acute fracture or destructive osseous lesion  Impression: 6 mm obstructing calculus left proximal ureter with mild upstream hydroureteronephrosis and perinephric stranding  Colonic diverticulosis  Workstation performed: [id number]         ** Please Note: Dragon 360 Dictation voice to text software was used in the creation of this document   **

## 2023-04-10 ENCOUNTER — TRANSITIONAL CARE MANAGEMENT (OUTPATIENT)
Dept: INTERNAL MEDICINE CLINIC | Facility: CLINIC | Age: 53
End: 2023-04-10

## 2023-04-25 ENCOUNTER — TELEPHONE (OUTPATIENT)
Dept: NEUROLOGY | Facility: CLINIC | Age: 53
End: 2023-04-25

## 2023-04-25 ENCOUNTER — TELEMEDICINE (OUTPATIENT)
Dept: NEUROLOGY | Facility: CLINIC | Age: 53
End: 2023-04-25

## 2023-04-25 DIAGNOSIS — G43.709 CHRONIC MIGRAINE WITHOUT AURA WITHOUT STATUS MIGRAINOSUS, NOT INTRACTABLE: Primary | ICD-10-CM

## 2023-04-25 NOTE — TELEPHONE ENCOUNTER
Patient called wants to change appointment with Trena Wilson today to virtual, 4/25/23 at 3:15pm  Her mother was rushed to the hospital and she wants to stay by her side  She didn't want to lose the appointment  Pt will be using my chart  Chart updated

## 2023-04-25 NOTE — PROGRESS NOTES
Virtual Regular Visit    Verification of patient location:    Patient is located at Home in the following state in which I hold an active license [place]      Assessment/Plan:    Problem List Items Addressed This Visit        Cardiovascular and Mediastinum    Chronic migraine without aura without status migrainosus, not intractable - Primary     · Pt will restart Nurtec every other day  · She can take it as an abortive medication as well but she will need to skip the medication the following day  I have spent a total time of 30 minutes on 04/26/23 in caring for this patient including Diagnostic results, Prognosis, Risks and benefits of tx options, Instructions for management, Patient and family education, Importance of tx compliance, Risk factor reductions, Impressions, Counseling / Coordination of care, Documenting in the medical record, Reviewing / ordering tests, medicine, procedures   and Obtaining or reviewing history    She will follow-up in 4 months  Reason for visit is   Chief Complaint   Patient presents with   • Virtual Regular Visit   • Virtual Regular Visit        Encounter provider Dez Miramontes PA-C    Provider located at [facility] [id number]      Recent Visits  No visits were found meeting these conditions  Showing recent visits within past 7 days and meeting all other requirements  Today's Visits  Date Type Provider Dept   04/25/23 Telephone Dez Miramontes PA-C [facility]   04/25/23 Telemedicine Dez Miramontes PA-C [facility]   Showing today's visits and meeting all other requirements  Future Appointments  No visits were found meeting these conditions  Showing future appointments within next 150 days and meeting all other requirements       The patient was identified by name and date of birth   Deuce Christensen was informed that this is a telemedicine visit and that the visit is being conducted through the [employer]  She agrees to proceed     My office door was closed  No one else was in the room  She acknowledged consent and understanding of privacy and security of the video platform  The patient has agreed to participate and understands they can discontinue the visit at any time  Patient is aware this is a billable service  Jonatan Ghosh is a 47 y o  female, with hyperlipidemia, seasonal allergies, insomnia and GERD, who follows in the office due to migraine headaches  She was getting Botox injections but was having breakthrough migraines expecially in the month prior to Botox injections being due  She was started on Nurtec  She states that she was taking the Nurtec every other day and has not noticed any worsening of migraines  She recently stopped it when she needed some procedures for kidney stones  Her last Botox injections were in December of 2021  She had an event after which she got lost while driving  She was not in a familiar area at the time and her GPS was not working  She became very upset and had what she thinks was a panic attack  Her daughters had to come pick her up  When they arrived she was crying, rocking and hitting herself in the head  She has never had any episodes like this in the past  She remembers the event  She has not had any further events  PREVENTIVE:  - verapamil 240mg   - Nurtec every other day  Prior:  - cyproheptadine   - gabapentin - side effects  - pamelor, venlafaxine, protriptyline  - zonisamide, topamax  - amlodipine  - Botox     ABORTIVE:   Current:  - Nurtec 75mg prn     Prior:  - Treximet, Maxalt   - naproxen, dexamethasone, ibuprofen, Toradol- did help break help     What is her current pain level? 8/10     How often do the headaches occur -   Mild headaches: 1-2 times a week from 2-4 times a week;  Migraines: 1-2 times per week  Are you ever headache free?  Yes Aura/warning sign and how long does it last? No aura but warning sign is spike of pain in left temporal      What time of the day do the headaches start -   Mild headaches: morning  Migraine headaches: morning     How long do the headaches last?  Mild headaches: 2 hours with ibuprofen   Migraines: 3-4 hours with medication  If medication doesn't help 1/2 day to full day  Where are they located? Mild headaches: left sided  Migraine headaches: spike in left temporal region then takes over entire left side and apex  Rarely located on right side  What is the intensity of pain? Mild headache- average pain level 4-5/10;   Migraines - 6-10/10     Describe your usual headache -   Mild headaches- dull  migraines- Pressure, throbbing at time,  sharp     Associated symptoms (migraines):   - nausea, Decrease appetite,   - photophobia, phonophobia, sensitive to smells,  -  Problem with concentration,  -  left pupil size change,   - light-headed or dizzy,   - stiff or sore neck,   - prefer to be alone and in a dark room,  -  irritable, easily frustrated      Number of days missed per month because of headaches:  Work (or school) days: a few times per month will either go home early or come in late   Social or Family activities: no     Non-Medical/Alternative Treatments used in the past for headaches: massage   Headache trigger: Fatigue, Stress/Tension, missing meals, chewing, lack of sleep, menstruation, weather     Have you used CBD or THC for your headaches and how often? No     Water intake: 40-60 oz  Caffeine intake: coffee 12-16 oz; occasional iced tea     Sleep:  Has improved with THC and Ambien  History of moderate sleep apnea, diagnosed in 2018  Does not use CPAP         Past Medical History:   Diagnosis Date   • Anxiety    • Bruxism (teeth grinding)    • Endometriosis    • Esophageal reflux    • High cholesterol    • Migraines    • Plantar fasciitis        Past Surgical History:   Procedure Laterality Date   • BONE GRAFT      L jaw for future false tooth implant   •  SECTION     • FL RETROGRADE PYELOGRAM  2023   • FOOT SURGERY Left    • LAPAROSCOPY      of uterus   • SC CYSTO/URETERO W/LITHOTRIPSY &INDWELL STENT INSRT Left 2023    Procedure: CYSTOSCOPY  RETROGRADE PYELOGRAM AND INSERTION STENT URETERAL;  Surgeon: Kaela Bernstein MD;  Location: [facility];  Service: Urology   • TOOTH EXTRACTION      3 besides wisdom teeth   • WISDOM TOOTH EXTRACTION      2 of teeth       Current Outpatient Medications   Medication Sig Dispense Refill   • acetaminophen (TYLENOL) 325 mg tablet Take 2 tablets (650 mg total) by mouth every 6 (six) hours as needed for mild pain, fever or headaches 30 tablet 0   • atorvastatin (LIPITOR) 20 mg tablet take 1 tablet by mouth once daily 90 tablet 3   • Cholecalciferol (VITAMIN D3 PO) Take by mouth     • NON FORMULARY Take 25 mg by mouth daily at bedtime THC gummies for sleep     • omeprazole (PriLOSEC) 20 mg delayed release capsule Take 20 mg by mouth daily as needed      • oxybutynin (DITROPAN) 5 mg tablet Take 1 tablet (5 mg total) by mouth every 6 (six) hours as needed (for bladder spasms) 30 tablet 0   • Rimegepant Sulfate (Nurtec) 75 MG TBDP Take 75 mg by mouth every other day 16 tablet 11   • tamsulosin (FLOMAX) 0 4 mg Take 1 capsule (0 4 mg total) by mouth daily with dinner 30 capsule 0   • verapamil (CALAN-SR) 240 mg CR tablet take 1 tablet by mouth once daily at bedtime 90 tablet 1   • zolpidem (AMBIEN) 5 mg tablet take 1 tablet by mouth at bedtime if needed for sleep 30 tablet 1   • diclofenac sodium (VOLTAREN) 50 mg EC tablet Take 1 tablet (50 mg total) by mouth 3 (three) times a day for 7 days 21 tablet 0   • docusate sodium (COLACE) 100 mg capsule Take 1 capsule (100 mg total) by mouth 2 (two) times a day for 15 days 30 capsule 0   • fluticasone (FLONASE) 50 mcg/act nasal spray 2 sprays into each nostril daily 16 g 11     No current facility-administered medications for this visit  Allergies   Allergen Reactions   • Gabapentin Swelling       Review of Systems   Constitutional: Negative  Negative for appetite change, chills and fever  HENT: Negative  Negative for ear pain, hearing loss, sore throat, tinnitus, trouble swallowing and voice change  Eyes: Negative  Negative for photophobia, pain and visual disturbance  Respiratory: Negative  Negative for cough and shortness of breath  Congestion   Cardiovascular: Negative  Negative for chest pain and palpitations  Gastrointestinal: Negative  Negative for abdominal pain, nausea and vomiting  Endocrine: Negative  Negative for cold intolerance  Genitourinary: Negative  Negative for dysuria, frequency, hematuria and urgency  Musculoskeletal: Negative  Negative for arthralgias, back pain, gait problem, myalgias and neck pain  Skin: Negative  Negative for color change and rash  Allergic/Immunologic: Negative  Neurological: Positive for headaches  Negative for dizziness, tremors, seizures, syncope, facial asymmetry, speech difficulty, weakness, light-headedness and numbness  Hematological: Negative  Does not bruise/bleed easily  Psychiatric/Behavioral: Negative  Negative for confusion, hallucinations and sleep disturbance  All other systems reviewed and are negative  Video Exam    There were no vitals filed for this visit  Physical Exam  Constitutional:       Appearance: Normal appearance  HENT:      Head: Normocephalic and atraumatic  Nose: Nose normal       Mouth/Throat:      Mouth: Mucous membranes are moist    Eyes:      Extraocular Movements: Extraocular movements intact  Cardiovascular:      Pulses: Normal pulses  Neurological:      Mental Status: She is alert and oriented to person, place, and time  Comments: Speech and language intact    Face symmetric   Psychiatric:         Mood and Affect: Mood normal          Behavior: Behavior normal  Thought Content:  Thought content normal          Judgment: Judgment normal           Visit Time  Total Visit Duration: 30 minutes

## 2023-04-26 ENCOUNTER — APPOINTMENT (OUTPATIENT)
Dept: LAB | Facility: MEDICAL CENTER | Age: 53
End: 2023-04-26

## 2023-04-26 DIAGNOSIS — R39.89 SUSPECTED UTI: ICD-10-CM

## 2023-04-26 DIAGNOSIS — N20.0 CALCULUS OF KIDNEY: ICD-10-CM

## 2023-04-26 NOTE — ASSESSMENT & PLAN NOTE
· Pt will restart Nurtec every other day  · She can take it as an abortive medication as well but she will need to skip the medication the following day  I have spent a total time of 30 minutes on 04/26/23 in caring for this patient including Diagnostic results, Prognosis, Risks and benefits of tx options, Instructions for management, Patient and family education, Importance of tx compliance, Risk factor reductions, Impressions, Counseling / Coordination of care, Documenting in the medical record, Reviewing / ordering tests, medicine, procedures   and Obtaining or reviewing history    She will follow-up in 4 months

## 2023-04-26 NOTE — PATIENT INSTRUCTIONS
Chronic migraine without aura without status migrainosus, not intractable  Pt will restart Nurtec every other day  She can take it as an abortive medication as well but she will need to skip the medication the following day  I have spent a total time of 30 minutes on 04/26/23 in caring for this patient including Diagnostic results, Prognosis, Risks and benefits of tx options, Instructions for management, Patient and family education, Importance of tx compliance, Risk factor reductions, Impressions, Counseling / Coordination of care, Documenting in the medical record, Reviewing / ordering tests, medicine, procedures   and Obtaining or reviewing history    She will follow-up in 4 months

## 2023-04-27 RX ORDER — ALBUTEROL SULFATE 90 UG/1
2 AEROSOL, METERED RESPIRATORY (INHALATION) EVERY 6 HOURS PRN
COMMUNITY

## 2023-04-27 RX ORDER — POLYETHYLENE GLYCOL 3350 17 G/17G
17 POWDER, FOR SOLUTION ORAL AS NEEDED
COMMUNITY

## 2023-04-27 RX ORDER — IBUPROFEN 200 MG
TABLET ORAL EVERY 6 HOURS PRN
COMMUNITY

## 2023-04-27 NOTE — PRE-PROCEDURE INSTRUCTIONS
Pre-Surgery Instructions:   Medication Instructions    acetaminophen (TYLENOL) 325 mg tablet Uses PRN- OK to take day of surgery    albuterol (PROVENTIL HFA,VENTOLIN HFA) 90 mcg/act inhaler Uses PRN- OK to take day of surgery    atorvastatin (LIPITOR) 20 mg tablet Take night before surgery    Cholecalciferol (VITAMIN D3 PO) instructed to hold    docusate sodium (COLACE) 100 mg capsule Hold day of surgery   ibuprofen (MOTRIN) 200 mg tablet instructed to hold    NON FORMULARY Hold day of surgery   omeprazole (PriLOSEC) 20 mg delayed release capsule Uses PRN- OK to take day of surgery    polyethylene glycol (MIRALAX) 17 g packet Hold day of surgery   PSEUDOEPHEDRINE-DM PO Hold day of surgery   Rimegepant Sulfate (Nurtec) 75 MG TBDP Hold day of surgery   tamsulosin (FLOMAX) 0 4 mg Take night before surgery    verapamil (CALAN-SR) 240 mg CR tablet Take night before surgery    zolpidem (AMBIEN) 5 mg tablet Take night before surgery    [facility] preop instructions reviewed with pt  Pt will get surgical soap

## 2023-04-28 ENCOUNTER — ANESTHESIA EVENT (OUTPATIENT)
Dept: PERIOP | Facility: HOSPITAL | Age: 53
End: 2023-04-28

## 2023-04-28 LAB — BACTERIA UR CULT: NORMAL

## 2023-05-01 ENCOUNTER — APPOINTMENT (OUTPATIENT)
Dept: RADIOLOGY | Facility: HOSPITAL | Age: 53
End: 2023-05-01

## 2023-05-01 ENCOUNTER — HOSPITAL ENCOUNTER (OUTPATIENT)
Facility: HOSPITAL | Age: 53
Setting detail: OUTPATIENT SURGERY
Discharge: HOME | End: 2023-05-01
Attending: UROLOGY | Admitting: UROLOGY

## 2023-05-01 ENCOUNTER — ANESTHESIA (OUTPATIENT)
Dept: PERIOP | Facility: HOSPITAL | Age: 53
End: 2023-05-01

## 2023-05-01 VITALS
DIASTOLIC BLOOD PRESSURE: 78 MMHG | SYSTOLIC BLOOD PRESSURE: 118 MMHG | RESPIRATION RATE: 16 BRPM | BODY MASS INDEX: 31.14 KG/M2 | HEART RATE: 65 BPM | OXYGEN SATURATION: 95 % | TEMPERATURE: 98.1 F | WEIGHT: 193.78 LBS | HEIGHT: 66 IN

## 2023-05-01 DIAGNOSIS — N20.1 URETERAL CALCULUS, LEFT: Primary | ICD-10-CM

## 2023-05-01 DIAGNOSIS — N20.1 CALCULUS OF URETER: ICD-10-CM

## 2023-05-01 LAB
EXT PREGNANCY TEST URINE: NEGATIVE
EXT. CONTROL: NORMAL

## 2023-05-01 DEVICE — INLAY OPTIMA URETERAL STENT W/O GUIDEWIRE
Type: IMPLANTABLE DEVICE | Site: URETER | Status: FUNCTIONAL
Brand: BARD® INLAY OPTIMA® URETERAL STENT

## 2023-05-01 RX ORDER — MIDAZOLAM HYDROCHLORIDE 2 MG/2ML
INJECTION, SOLUTION INTRAMUSCULAR; INTRAVENOUS AS NEEDED
Status: DISCONTINUED | OUTPATIENT
Start: 2023-05-01 | End: 2023-05-01

## 2023-05-01 RX ORDER — LIDOCAINE HYDROCHLORIDE 20 MG/ML
INJECTION, SOLUTION EPIDURAL; INFILTRATION; INTRACAUDAL; PERINEURAL AS NEEDED
Status: DISCONTINUED | OUTPATIENT
Start: 2023-05-01 | End: 2023-05-01

## 2023-05-01 RX ORDER — SODIUM CHLORIDE 9 MG/ML
INJECTION, SOLUTION INTRAVENOUS AS NEEDED
Status: DISCONTINUED | OUTPATIENT
Start: 2023-05-01 | End: 2023-05-01 | Stop reason: HOSPADM

## 2023-05-01 RX ORDER — HYDROCODONE BITARTRATE AND ACETAMINOPHEN 5; 325 MG/1; MG/1
1 TABLET ORAL EVERY 6 HOURS PRN
Status: DISCONTINUED | OUTPATIENT
Start: 2023-05-01 | End: 2023-05-01 | Stop reason: HOSPADM

## 2023-05-01 RX ORDER — DEXAMETHASONE SODIUM PHOSPHATE 10 MG/ML
INJECTION, SOLUTION INTRAMUSCULAR; INTRAVENOUS AS NEEDED
Status: DISCONTINUED | OUTPATIENT
Start: 2023-05-01 | End: 2023-05-01

## 2023-05-01 RX ORDER — PROPOFOL 10 MG/ML
INJECTION, EMULSION INTRAVENOUS AS NEEDED
Status: DISCONTINUED | OUTPATIENT
Start: 2023-05-01 | End: 2023-05-01

## 2023-05-01 RX ORDER — CEFAZOLIN SODIUM 2 G/50ML
2000 SOLUTION INTRAVENOUS ONCE
Status: COMPLETED | OUTPATIENT
Start: 2023-05-01 | End: 2023-05-01

## 2023-05-01 RX ORDER — SODIUM CHLORIDE 9 MG/ML
125 INJECTION, SOLUTION INTRAVENOUS CONTINUOUS
Status: DISCONTINUED | OUTPATIENT
Start: 2023-05-01 | End: 2023-05-01 | Stop reason: HOSPADM

## 2023-05-01 RX ORDER — FENTANYL CITRATE 50 UG/ML
INJECTION, SOLUTION INTRAMUSCULAR; INTRAVENOUS AS NEEDED
Status: DISCONTINUED | OUTPATIENT
Start: 2023-05-01 | End: 2023-05-01

## 2023-05-01 RX ORDER — FENTANYL CITRATE 50 UG/ML
50 INJECTION, SOLUTION INTRAMUSCULAR; INTRAVENOUS
Status: DISCONTINUED | OUTPATIENT
Start: 2023-05-01 | End: 2023-05-01 | Stop reason: HOSPADM

## 2023-05-01 RX ORDER — ONDANSETRON 2 MG/ML
4 INJECTION INTRAMUSCULAR; INTRAVENOUS EVERY 6 HOURS PRN
Status: DISCONTINUED | OUTPATIENT
Start: 2023-05-01 | End: 2023-05-01 | Stop reason: HOSPADM

## 2023-05-01 RX ORDER — ONDANSETRON 2 MG/ML
INJECTION INTRAMUSCULAR; INTRAVENOUS AS NEEDED
Status: DISCONTINUED | OUTPATIENT
Start: 2023-05-01 | End: 2023-05-01

## 2023-05-01 RX ORDER — CEPHALEXIN 500 MG/1
500 CAPSULE ORAL EVERY 12 HOURS SCHEDULED
Qty: 6 CAPSULE | Refills: 0 | Status: SHIPPED | OUTPATIENT
Start: 2023-05-01 | End: 2023-05-04

## 2023-05-01 RX ORDER — MAGNESIUM HYDROXIDE 1200 MG/15ML
LIQUID ORAL AS NEEDED
Status: DISCONTINUED | OUTPATIENT
Start: 2023-05-01 | End: 2023-05-01 | Stop reason: HOSPADM

## 2023-05-01 RX ADMIN — MIDAZOLAM 2 MG: 1 INJECTION INTRAMUSCULAR; INTRAVENOUS at 10:43

## 2023-05-01 RX ADMIN — FENTANYL CITRATE 50 MCG: 50 INJECTION INTRAMUSCULAR; INTRAVENOUS at 11:11

## 2023-05-01 RX ADMIN — ONDANSETRON 4 MG: 2 INJECTION INTRAMUSCULAR; INTRAVENOUS at 10:54

## 2023-05-01 RX ADMIN — FENTANYL CITRATE 50 MCG: 50 INJECTION INTRAMUSCULAR; INTRAVENOUS at 10:55

## 2023-05-01 RX ADMIN — LIDOCAINE HYDROCHLORIDE 100 MG: 20 INJECTION, SOLUTION EPIDURAL; INFILTRATION; INTRACAUDAL; PERINEURAL at 10:51

## 2023-05-01 RX ADMIN — CEFAZOLIN SODIUM 2000 MG: 2 SOLUTION INTRAVENOUS at 10:39

## 2023-05-01 RX ADMIN — PROPOFOL 200 MG: 10 INJECTION, EMULSION INTRAVENOUS at 10:51

## 2023-05-01 RX ADMIN — SODIUM CHLORIDE 125 ML/HR: 0.9 INJECTION, SOLUTION INTRAVENOUS at 10:06

## 2023-05-01 RX ADMIN — DEXAMETHASONE SODIUM PHOSPHATE 8 MG: 10 INJECTION INTRAMUSCULAR; INTRAVENOUS at 10:54

## 2023-05-01 NOTE — INTERVAL H&P NOTE
H&P reviewed  After examining the patient I find no changes in the patients condition since the H&P had been written  Vitals:    05/01/23 0958   BP: 129/86   Pulse: 80   Resp: 16   Temp: 98 6 °F (37 °C)   SpO2: 96%     Patient now presents for 2nd stage ureteroscopy  Procedure, risks, and benefits discussed  Consent obtained for left ureteroscopy, laser, stone extraction, stent  All questions answered to their satisfaction

## 2023-05-01 NOTE — ANESTHESIA POSTPROCEDURE EVALUATION
"Post-Op Assessment Note    CV Status:  Stable    Pain management: adequate     Mental Status:  Alert and awake   Hydration Status:  Euvolemic   PONV Controlled:  Controlled   Airway Patency:  Patent      Post Op Vitals Reviewed: Yes      Staff: Anesthesiologist         No notable events documented      BP      Temp      Pulse     Resp      SpO2      /74   Pulse 62   Temp 97 6 °F (36 4 °C)   Resp 16   Ht 5' 6\" (1 676 m)   Wt 87 9 kg (193 lb 12 6 oz)   SpO2 95%   BMI 31 28 kg/m²     "

## 2023-05-01 NOTE — ANESTHESIA PREPROCEDURE EVALUATION
Procedure:  CYSTO USCOPE W/  LASER, &STENT (Left: Bladder)    Relevant Problems   CARDIO   (+) Chronic migraine without aura without status migrainosus, not intractable   (+) Hyperlipidemia   (+) Hypertension      MUSCULOSKELETAL   (+) Chondromalacia patellae      NEURO/PSYCH   (+) Anxiety   (+) Chronic migraine without aura without status migrainosus, not intractable      PULMONARY   (+) Cough variant asthma   (+) Obstructive sleep apnea   (+) Shortness of breath      Other   (+) Obesity (BMI 30-39  9)        Physical Exam    Airway    Mallampati score: II  TM Distance: >3 FB  Neck ROM: full     Dental   No notable dental hx     Cardiovascular  Rhythm: regular, Rate: normal, Cardiovascular exam normal    Pulmonary  Pulmonary exam normal Breath sounds clear to auscultation,     Other Findings        Anesthesia Plan  ASA Score- 2     Anesthesia Type- general with ASA Monitors  Additional Monitors:   Airway Plan:           Plan Factors-    Chart reviewed  Patient summary reviewed  Patient is not a current smoker  Patient not instructed to abstain from smoking on day of procedure  Patient did not smoke on day of surgery  Induction- intravenous  Postoperative Plan- Plan for postoperative opioid use  Planned trial extubation    Informed Consent- Anesthetic plan and risks discussed with patient

## 2023-05-01 NOTE — OP NOTE
OPERATIVE REPORT  PATIENT NAME: Onesimo Hollis    :  1970  MRN: [id number]  Pt Location: [facility] OR ROOM 05    SURGERY DATE: 2023    Surgeon(s) and Role:     * Rozanne Goldmann, MD - Primary    Preop Diagnosis:  Calculus of ureter [N20 1]    Post-Op Diagnosis Codes:     * Calculus of ureter [N20 1]    Procedure(s):  Left - CYSTO URETEROSCOPY W/ HOLMIUM LASER STONE  &STENT PLACEMENT    Specimen(s):  * No specimens in log *    Estimated Blood Loss:   Minimal    Drains:  Ureteral Internal Stent Left ureter 6 Fr  (Active)   Number of days: 0       Anesthesia Type:   General    Operative Indications:  Calculus of ureter [N20 1]      Operative Findings:  Stone lasered and destroyed  Complications:   None    Procedure and Technique:  The patient was identified, brought to the operating room, and placed on the table in supine position  After induction of general anesthesia, the patient was placed in dorsal lithotomy position and prepped and draped in the usual sterile fashion  A complete formal timeout was performed  The 25 Mauritian rigid cystoscope was placed per urethra and cystoscopy was performed  There was no bladder abnormality identified  The left ureteral stent was identified and grasped with a grasper, brought out through the meatus, and cannulated with a Solo wire  A dual-lumen catheter was placed followed by a second guidewire and ureteral access sheath  The 8 5 Mauritian flexible ureteroscope was placed and the stone was identified within a mid pole renal calyx  A holmium laser fiber was placed and laser lithotripsy was performed  When the stone was of suitable size, a zero tip stone basket was placed and the stone fragments were retrieved  At this point, a retrograde pyelogram was performed delineating the upper urinary tract anatomy  No further filling defect identified  The ureteral length measured   The safety wire was backloaded into the cystoscope and a 268 cm x 6 Mauritian double-J stent was placed with string  The patient tolerated the procedure well and was transferred to the recovery room awake alert and in stable condition  Plan: stent/string until Friday 5/5  I was present for the entire procedure      Patient Disposition:  PACU         SIGNATURE: Alessandra Shirley MD  DATE: May 1, 2023  TIME: 11:29 AM

## 2023-05-03 ENCOUNTER — NURSE TRIAGE (OUTPATIENT)
Dept: OTHER | Facility: OTHER | Age: 53
End: 2023-05-03

## 2023-05-03 NOTE — TELEPHONE ENCOUNTER
"Patient is post cystoscopy, lithothripsy, and sten placement on Monday 5/1/23  Patient reports severe sharp bladder spasms with incontinence  Taking Oxybutynin, but is concerned about the spasms with incontinence  Also reports moderate pain when sitting; feels like stent is coming out and thinks she feels it at opening  Still has traces of blood in urine, but improving with hydration  Please follow up with patient for stent position and bladder spasms with incontinence  Reason for Disposition   SEVERE post-op pain (e g , excruciating, pain scale 8-10) that is not controlled with pain medications    Answer Assessment - Initial Assessment Questions  1  SYMPTOM: \"What's the main symptom you're concerned about? \" (e g , pain, fever, vomiting)      Stent is coming out; increased pressure from sitting; bladder spasms with incontinence  2  ONSET: \"When did symptoms start? \"      today  3  SURGERY: \"What surgery was performed? \"      *No Answer*  4  DATE of SURGERY: \"When was surgery performed? \"       5/1/23  5  ANESTHESIA: \" What type of anesthesia did you have? \" (e g , general, spinal, epidural, local)      *No Answer*  6  PAIN: \"Is there any pain? \" If Yes, ask: \"How bad is it? \"  (Scale 1-10; or mild, moderate, severe)      Spasms are severe (10) are sharp, instantaneous; pain when sitting is moderate (6-7)  7  FEVER: \"Do you have a fever? \" If Yes, ask: \"What is your temperature, how was it measured, and when did it start? \"      Denies  8  VOMITING: \"Is there any vomiting? \" If yes, ask: \"How many times? \"      Denies  9  BLEEDING: \"Is there any bleeding? \" If Yes, ask: \"How much? \" and \"Where? \"      Traces of blood in urine; hydrating  10  OTHER SYMPTOMS: \"Do you have any other symptoms? \" (e g , drainage from wound, painful urination, constipation)       Denies    Protocols used: POST-OP SYMPTOMS AND QUESTIONS-ADULT-[place]    "

## 2023-05-03 NOTE — TELEPHONE ENCOUNTER
"Regarding: post procedure/ stent issue  ----- Message from Fahad Beckett sent at 5/3/2023  3:29 PM EDT -----  \"I had a procedure done on Monday for my kidney stones I had a stent placed in and I am leaking urine and I fell like the stent might be coming out I didn't know if this is normal I wanted to speak to a nurse\"    "

## 2023-05-09 ENCOUNTER — APPOINTMENT (OUTPATIENT)
Dept: LAB | Facility: MEDICAL CENTER | Age: 53
End: 2023-05-09

## 2023-05-09 DIAGNOSIS — G47.00 INSOMNIA, UNSPECIFIED TYPE: ICD-10-CM

## 2023-05-09 DIAGNOSIS — J30.89 ALLERGIC RHINITIS DUE TO HOUSE DUST MITE: ICD-10-CM

## 2023-05-09 DIAGNOSIS — E55.9 VITAMIN D DEFICIENCY: ICD-10-CM

## 2023-05-09 DIAGNOSIS — E78.2 MIXED HYPERLIPIDEMIA: ICD-10-CM

## 2023-05-09 DIAGNOSIS — I10 PRIMARY HYPERTENSION: ICD-10-CM

## 2023-05-09 DIAGNOSIS — J32.9 CHRONIC SINUSITIS, UNSPECIFIED LOCATION: ICD-10-CM

## 2023-05-09 LAB
25(OH)D3 SERPL-MCNC: 31.8 NG/ML (ref 30–100)
ALBUMIN SERPL BCP-MCNC: 3.6 G/DL (ref 3.5–5)
ALP SERPL-CCNC: 81 U/L (ref 46–116)
ALT SERPL W P-5'-P-CCNC: 17 U/L (ref 12–78)
ANION GAP SERPL CALCULATED.3IONS-SCNC: 5 MMOL/L (ref 4–13)
AST SERPL W P-5'-P-CCNC: 18 U/L (ref 5–45)
BASOPHILS # BLD AUTO: 0.03 THOUSANDS/ÂΜL (ref 0–0.1)
BASOPHILS NFR BLD AUTO: 0 % (ref 0–1)
BILIRUB SERPL-MCNC: 0.82 MG/DL (ref 0.2–1)
BUN SERPL-MCNC: 10 MG/DL (ref 5–25)
CALCIUM SERPL-MCNC: 9.3 MG/DL (ref 8.3–10.1)
CHLORIDE SERPL-SCNC: 107 MMOL/L (ref 96–108)
CHOLEST SERPL-MCNC: 134 MG/DL
CO2 SERPL-SCNC: 25 MMOL/L (ref 21–32)
CREAT SERPL-MCNC: 0.93 MG/DL (ref 0.6–1.3)
EOSINOPHIL # BLD AUTO: 0.22 THOUSAND/ÂΜL (ref 0–0.61)
EOSINOPHIL NFR BLD AUTO: 3 % (ref 0–6)
ERYTHROCYTE [DISTWIDTH] IN BLOOD BY AUTOMATED COUNT: 12.9 % (ref 11.6–15.1)
GFR SERPL CREATININE-BSD FRML MDRD: 71 ML/MIN/1.73SQ M
GLUCOSE P FAST SERPL-MCNC: 80 MG/DL (ref 65–99)
HCT VFR BLD AUTO: 44 % (ref 34.8–46.1)
HDLC SERPL-MCNC: 53 MG/DL
HGB BLD-MCNC: 14.6 G/DL (ref 11.5–15.4)
IMM GRANULOCYTES # BLD AUTO: 0.02 THOUSAND/UL (ref 0–0.2)
IMM GRANULOCYTES NFR BLD AUTO: 0 % (ref 0–2)
LDLC SERPL CALC-MCNC: 52 MG/DL (ref 0–100)
LYMPHOCYTES # BLD AUTO: 2.58 THOUSANDS/ÂΜL (ref 0.6–4.47)
LYMPHOCYTES NFR BLD AUTO: 33 % (ref 14–44)
MCH RBC QN AUTO: 30.9 PG (ref 26.8–34.3)
MCHC RBC AUTO-ENTMCNC: 33.2 G/DL (ref 31.4–37.4)
MCV RBC AUTO: 93 FL (ref 82–98)
MONOCYTES # BLD AUTO: 0.78 THOUSAND/ÂΜL (ref 0.17–1.22)
MONOCYTES NFR BLD AUTO: 10 % (ref 4–12)
NEUTROPHILS # BLD AUTO: 4.24 THOUSANDS/ÂΜL (ref 1.85–7.62)
NEUTS SEG NFR BLD AUTO: 54 % (ref 43–75)
NONHDLC SERPL-MCNC: 81 MG/DL
NRBC BLD AUTO-RTO: 0 /100 WBCS
PLATELET # BLD AUTO: 338 THOUSANDS/UL (ref 149–390)
PMV BLD AUTO: 9.9 FL (ref 8.9–12.7)
POTASSIUM SERPL-SCNC: 3.4 MMOL/L (ref 3.5–5.3)
PROT SERPL-MCNC: 7.2 G/DL (ref 6.4–8.4)
RBC # BLD AUTO: 4.73 MILLION/UL (ref 3.81–5.12)
SODIUM SERPL-SCNC: 137 MMOL/L (ref 135–147)
TRIGL SERPL-MCNC: 147 MG/DL
TSH SERPL DL<=0.05 MIU/L-ACNC: 2.37 UIU/ML (ref 0.45–4.5)
WBC # BLD AUTO: 7.87 THOUSAND/UL (ref 4.31–10.16)

## 2023-05-12 DIAGNOSIS — G47.00 INSOMNIA, UNSPECIFIED TYPE: ICD-10-CM

## 2023-05-12 RX ORDER — ZOLPIDEM TARTRATE 5 MG/1
TABLET ORAL
Qty: 30 TABLET | Refills: 1 | Status: SHIPPED | OUTPATIENT
Start: 2023-05-12

## 2023-05-15 ENCOUNTER — OFFICE VISIT (OUTPATIENT)
Dept: INTERNAL MEDICINE CLINIC | Facility: CLINIC | Age: 53
End: 2023-05-15

## 2023-05-15 VITALS
OXYGEN SATURATION: 97 % | TEMPERATURE: 97.3 F | HEIGHT: 66 IN | SYSTOLIC BLOOD PRESSURE: 116 MMHG | HEART RATE: 68 BPM | BODY MASS INDEX: 31.57 KG/M2 | DIASTOLIC BLOOD PRESSURE: 72 MMHG | WEIGHT: 196.4 LBS

## 2023-05-15 DIAGNOSIS — N20.1 URETERAL CALCULUS, LEFT: ICD-10-CM

## 2023-05-15 DIAGNOSIS — I10 PRIMARY HYPERTENSION: Primary | ICD-10-CM

## 2023-05-15 DIAGNOSIS — E87.6 HYPOKALEMIA: ICD-10-CM

## 2023-05-15 DIAGNOSIS — Z12.31 VISIT FOR SCREENING MAMMOGRAM: ICD-10-CM

## 2023-05-15 DIAGNOSIS — E78.2 MIXED HYPERLIPIDEMIA: ICD-10-CM

## 2023-05-15 DIAGNOSIS — E55.9 VITAMIN D DEFICIENCY: ICD-10-CM

## 2023-05-15 DIAGNOSIS — J30.1 CHRONIC SEASONAL ALLERGIC RHINITIS DUE TO POLLEN: ICD-10-CM

## 2023-05-15 DIAGNOSIS — G43.709 CHRONIC MIGRAINE WITHOUT AURA WITHOUT STATUS MIGRAINOSUS, NOT INTRACTABLE: ICD-10-CM

## 2023-05-15 NOTE — PROGRESS NOTES
Assessment/Plan:    No problem-specific Assessment & Plan notes found for this encounter  Diagnoses and all orders for this visit:    Primary hypertension  -     CBC and differential; Future  -     Comprehensive metabolic panel; Future    Mixed hyperlipidemia  -     Comprehensive metabolic panel; Future  -     Lipid panel; Future  -     TSH, 3rd generation; Future    Vitamin D deficiency  -     Vitamin D 25 hydroxy; Future    Chronic seasonal allergic rhinitis due to pollen  -     CBC and differential; Future    Chronic migraine without aura without status migrainosus, not intractable    Ureteral calculus, left    Visit for screening mammogram  -     Mammo screening bilateral w 3d & cad; Future    Hypokalemia  -     Basic metabolic panel; Future   Orders and recommendations as noted above  Recent issues with the kidney stone discussed  Follow-up with urology as planned  Discussed with her contacting them if she has any persistent left flank pain  This is especially important with her upcoming trip  Blood pressure controlled  Continue with the verapamil as previously  Watch salt intake  Stay adequately hydrated  Continue with the atorvastatin as previously  Watch diet  Increase fiber in diet  Continue with the Flonase for the allergy symptoms  Watch for any worsening  Continue to follow-up with neurology regarding the migraines  Recent low potassium discussed  Foods higher in potassium discussed  She wishes to avoid an additional medication if possible  We will recheck this over the next few weeks  Recent stressors with her mother being ill discussed  We will have her follow-up in about 4 to 6 months or sooner if needed  Subjective:      Patient ID: Tamir Cano is a 47 y o  female  She presents for routine follow-up  Has had a stressful few months  She had the kidney stone and hospitalization for this  She did have a stent placed and this has subsequently been removed    Still having some occasional left flank pain  She denies any fevers or chills  Also with some stressors with her mother being severely ill recently  He had lost a lot of weight because of her illness as well as the stressors  Has begun eating relatively normally again  Is trying to resume her normal activity level  Tolerating her verapamil without difficulty  Denies any localized weakness  Still gets some of her chronic migraines but these have been relatively stable  Continues to follow-up with neurology  Tolerating her atorvastatin well  Denies any significant muscle aches or weakness with this  Denies any nausea, vomiting, or diarrhea  Still with some chronic sleep issues  The following portions of the patient's history were reviewed and updated as appropriate:   She  has a past medical history of Anxiety, Asthma, Bruxism (teeth grinding), Claustrophobia, Endometriosis, Esophageal reflux, High cholesterol, Hypertension, Kidney stone, Migraines, Plantar fasciitis, Seasonal allergies, Sleep apnea, Swelling of both lower extremities, Wears contact lenses, and Wears glasses    She   Patient Active Problem List    Diagnosis Date Noted   • Ureteral calculus, left 04/07/2023   • Cellulitis, face 05/17/2022   • Shortness of breath 03/16/2020   • Allergic conjunctivitis of both eyes 09/24/2019   • Allergic rhinitis due to house dust mite 09/24/2019   • Chronic seasonal allergic rhinitis due to pollen 09/24/2019   • Peripheral edema 02/05/2019   • Tachycardia 02/05/2019   • Hypersomnia 11/29/2018   • Chronic rhinitis 11/29/2018   • Obesity (BMI 30-39 9) 11/29/2018   • Chondromalacia patellae 10/05/2018   • Chronic migraine without aura without status migrainosus, not intractable 09/19/2018   • Insomnia 09/19/2018   • Vitamin D deficiency 09/26/2017   • Anxiety 04/06/2017   • Hyperlipidemia 12/28/2016   • Nonspecific abnormal results of function study of thyroid 12/28/2016   • Fatigue 07/21/2015   • Hypertension 2015   • Obstructive sleep apnea 2015   • Cough variant asthma 2013   • Chronic sinusitis 2013     She  has a past surgical history that includes  section; Foot surgery (Left); LAPAROSCOPY; [place] tooth extraction; Tooth extraction; Bone graft; [place] retrograde pyelogram (2023); pr cysto/uretero w/lithotripsy &indwell stent insrt (Left, 2023); pr cysto/uretero w/lithotripsy &indwell stent insrt (Left, 2023); and [place] retrograde pyelogram (2023)  Her family history includes Abdominal aortic aneurysm in her father; Allergies in her brother, daughter, daughter, sister, and sister; Asperger's syndrome in her brother and family; Bipolar disorder in her family and sister; Cancer in her maternal uncle; Colon cancer in her maternal grandmother; Depression in her family and mother; Diabetes in her family; Heart murmur in her mother; Hypertension in her family and father; Hypothyroidism in her sister; No Known Problems in her maternal grandfather, paternal grandfather, and paternal grandmother; Other in her mother; Raynaud syndrome in her family, sister, and sister  She was adopted  She  reports that she has never smoked  She has never used smokeless tobacco  She reports current alcohol use  She reports that she does not use drugs    Current Outpatient Medications   Medication Sig Dispense Refill   • albuterol (PROVENTIL HFA,VENTOLIN HFA) 90 mcg/act inhaler Inhale 2 puffs every 6 (six) hours as needed for wheezing     • atorvastatin (LIPITOR) 20 mg tablet take 1 tablet by mouth once daily 90 tablet 3   • Cholecalciferol (VITAMIN D3 PO) Take by mouth     • ibuprofen (MOTRIN) 200 mg tablet Take by mouth every 6 (six) hours as needed for mild pain     • NON FORMULARY Take 25 mg by mouth daily at bedtime THC gummies for sleep     • omeprazole (PriLOSEC) 20 mg delayed release capsule Take 20 mg by mouth daily as needed      • polyethylene glycol (MIRALAX) 17 g packet Take 17 g by mouth if needed     • Rimegepant Sulfate (Nurtec) 75 MG TBDP Take 75 mg by mouth every other day (Patient taking differently: Take 75 mg by mouth if needed) 16 tablet 11   • zolpidem (AMBIEN) 5 mg tablet take 1 tablet by mouth at bedtime if needed for sleep 30 tablet 1   • acetaminophen (TYLENOL) 325 mg tablet Take 2 tablets (650 mg total) by mouth every 6 (six) hours as needed for mild pain, fever or headaches 30 tablet 0   • fluticasone (FLONASE) 50 mcg/act nasal spray 2 sprays into each nostril daily 16 g 11   • verapamil (CALAN-SR) 240 mg CR tablet take 1 tablet by mouth once daily at bedtime 90 tablet 1     No current facility-administered medications for this visit  Current Outpatient Medications on File Prior to Visit   Medication Sig   • albuterol (PROVENTIL HFA,VENTOLIN HFA) 90 mcg/act inhaler Inhale 2 puffs every 6 (six) hours as needed for wheezing   • atorvastatin (LIPITOR) 20 mg tablet take 1 tablet by mouth once daily   • Cholecalciferol (VITAMIN D3 PO) Take by mouth   • ibuprofen (MOTRIN) 200 mg tablet Take by mouth every 6 (six) hours as needed for mild pain   • NON FORMULARY Take 25 mg by mouth daily at bedtime THC gummies for sleep   • omeprazole (PriLOSEC) 20 mg delayed release capsule Take 20 mg by mouth daily as needed    • polyethylene glycol (MIRALAX) 17 g packet Take 17 g by mouth if needed   • Rimegepant Sulfate (Nurtec) 75 MG TBDP Take 75 mg by mouth every other day (Patient taking differently: Take 75 mg by mouth if needed)   • zolpidem (AMBIEN) 5 mg tablet take 1 tablet by mouth at bedtime if needed for sleep   • acetaminophen (TYLENOL) 325 mg tablet Take 2 tablets (650 mg total) by mouth every 6 (six) hours as needed for mild pain, fever or headaches   • fluticasone (FLONASE) 50 mcg/act nasal spray 2 sprays into each nostril daily   • verapamil (CALAN-SR) 240 mg CR tablet take 1 tablet by mouth once daily at bedtime     No current facility-administered medications on file prior to visit  "    She is allergic to gabapentin       Review of Systems   Constitutional: Positive for activity change and fatigue  Negative for appetite change, chills and fever  HENT: Negative for congestion and rhinorrhea  Eyes: Negative for visual disturbance  Respiratory: Negative for chest tightness and shortness of breath  Cardiovascular: Negative for chest pain and palpitations  Gastrointestinal: Negative for abdominal pain, blood in stool, diarrhea, nausea and vomiting  Endocrine: Negative for polydipsia, polyphagia and polyuria  Genitourinary: Positive for flank pain  Negative for dysuria, frequency and urgency  Musculoskeletal: Positive for neck pain and neck stiffness  Negative for gait problem  Skin: Negative for color change  Neurological: Positive for headaches  Negative for dizziness  Hematological: Does not bruise/bleed easily  Psychiatric/Behavioral: Negative for confusion and sleep disturbance  The patient is nervous/anxious  Objective:      /72 (BP Location: Left arm, Patient Position: Sitting, Cuff Size: Large)   Pulse 68   Temp (!) 97 3 °F (36 3 °C)   Ht 5' 6\" (1 676 m)   Wt 89 1 kg (196 lb 6 4 oz)   SpO2 97%   BMI 31 70 kg/m²          Physical Exam  Vitals and nursing note reviewed  Constitutional:       General: She is not in acute distress  Appearance: She is well-developed, well-groomed and overweight  HENT:      Head: Normocephalic and atraumatic  Eyes:      General:         Right eye: No discharge  Left eye: No discharge  Conjunctiva/sclera: Conjunctivae normal       Pupils: Pupils are equal, round, and reactive to light  Cardiovascular:      Rate and Rhythm: Normal rate and regular rhythm  Heart sounds: Normal heart sounds  No murmur heard  No friction rub  No gallop  Pulmonary:      Effort: No respiratory distress  Breath sounds: No wheezing or rales     Abdominal:      General: Bowel sounds are normal  There is no " distension  Tenderness: There is no abdominal tenderness  Lymphadenopathy:      Cervical: No cervical adenopathy  Skin:     General: Skin is warm and dry  Neurological:      Mental Status: She is alert and oriented to person, place, and time  Psychiatric:         Mood and Affect: Mood and affect normal          Speech: Speech normal          Behavior: Behavior normal  Behavior is cooperative  Cognition and Memory: Cognition and memory normal          BMI Counseling: Body mass index is 31 7 kg/m²  The BMI is above normal  Nutrition recommendations include decreasing portion sizes, encouraging healthy choices of fruits and vegetables, decreasing fast food intake, consuming healthier snacks, limiting drinks that contain sugar, moderation in carbohydrate intake and reducing intake of cholesterol  Exercise recommendations include exercising 3-5 times per week  Rationale for BMI follow-up plan is due to patient being overweight or obese  Depression Screening and Follow-up Plan: Patient was screened for depression during today's encounter  They screened negative with a PHQ-2 score of 0

## 2023-05-21 ENCOUNTER — HOSPITAL ENCOUNTER (EMERGENCY)
Facility: HOSPITAL | Age: 53
Discharge: HOME | End: 2023-05-21
Attending: EMERGENCY MEDICINE

## 2023-05-21 ENCOUNTER — APPOINTMENT (EMERGENCY)
Dept: CT IMAGING | Facility: HOSPITAL | Age: 53
End: 2023-05-21

## 2023-05-21 VITALS
TEMPERATURE: 99.1 F | SYSTOLIC BLOOD PRESSURE: 160 MMHG | HEART RATE: 82 BPM | RESPIRATION RATE: 17 BRPM | OXYGEN SATURATION: 95 % | DIASTOLIC BLOOD PRESSURE: 103 MMHG

## 2023-05-21 DIAGNOSIS — N20.0 NEPHROLITHIASIS: ICD-10-CM

## 2023-05-21 DIAGNOSIS — R10.9 LEFT FLANK PAIN: Primary | ICD-10-CM

## 2023-05-21 LAB
ANION GAP SERPL CALCULATED.3IONS-SCNC: 9 MMOL/L (ref 4–13)
BASOPHILS # BLD AUTO: 0.04 THOUSANDS/ÂΜL (ref 0–0.1)
BASOPHILS NFR BLD AUTO: 1 % (ref 0–1)
BILIRUB UR QL STRIP: NEGATIVE
BUN SERPL-MCNC: 8 MG/DL (ref 5–25)
CALCIUM SERPL-MCNC: 9.4 MG/DL (ref 8.4–10.2)
CHLORIDE SERPL-SCNC: 104 MMOL/L (ref 96–108)
CLARITY UR: CLEAR
CO2 SERPL-SCNC: 27 MMOL/L (ref 21–32)
COLOR UR: ABNORMAL
CREAT SERPL-MCNC: 0.84 MG/DL (ref 0.6–1.3)
EOSINOPHIL # BLD AUTO: 0.26 THOUSAND/ÂΜL (ref 0–0.61)
EOSINOPHIL NFR BLD AUTO: 4 % (ref 0–6)
ERYTHROCYTE [DISTWIDTH] IN BLOOD BY AUTOMATED COUNT: 12.6 % (ref 11.6–15.1)
GFR SERPL CREATININE-BSD FRML MDRD: 80 ML/MIN/1.73SQ M
GLUCOSE SERPL-MCNC: 91 MG/DL (ref 65–140)
GLUCOSE UR STRIP-MCNC: NEGATIVE MG/DL
HCT VFR BLD AUTO: 42.2 % (ref 34.8–46.1)
HGB BLD-MCNC: 14.5 G/DL (ref 11.5–15.4)
HGB UR QL STRIP.AUTO: NEGATIVE
IMM GRANULOCYTES # BLD AUTO: 0.01 THOUSAND/UL (ref 0–0.2)
IMM GRANULOCYTES NFR BLD AUTO: 0 % (ref 0–2)
KETONES UR STRIP-MCNC: NEGATIVE MG/DL
LEUKOCYTE ESTERASE UR QL STRIP: NEGATIVE
LYMPHOCYTES # BLD AUTO: 2.53 THOUSANDS/ÂΜL (ref 0.6–4.47)
LYMPHOCYTES NFR BLD AUTO: 39 % (ref 14–44)
MCH RBC QN AUTO: 32 PG (ref 26.8–34.3)
MCHC RBC AUTO-ENTMCNC: 34.4 G/DL (ref 31.4–37.4)
MCV RBC AUTO: 93 FL (ref 82–98)
MONOCYTES # BLD AUTO: 0.58 THOUSAND/ÂΜL (ref 0.17–1.22)
MONOCYTES NFR BLD AUTO: 9 % (ref 4–12)
NEUTROPHILS # BLD AUTO: 3.02 THOUSANDS/ÂΜL (ref 1.85–7.62)
NEUTS SEG NFR BLD AUTO: 47 % (ref 43–75)
NITRITE UR QL STRIP: NEGATIVE
NRBC BLD AUTO-RTO: 0 /100 WBCS
PH UR STRIP.AUTO: 6.5 [PH]
PLATELET # BLD AUTO: 318 THOUSANDS/UL (ref 149–390)
PMV BLD AUTO: 9.2 FL (ref 8.9–12.7)
POTASSIUM SERPL-SCNC: 3.5 MMOL/L (ref 3.5–5.3)
PROT UR STRIP-MCNC: NEGATIVE MG/DL
RBC # BLD AUTO: 4.53 MILLION/UL (ref 3.81–5.12)
SODIUM SERPL-SCNC: 140 MMOL/L (ref 135–147)
SP GR UR STRIP.AUTO: <=1.005
UROBILINOGEN UR QL STRIP.AUTO: 0.2 E.U./DL
WBC # BLD AUTO: 6.44 THOUSAND/UL (ref 4.31–10.16)

## 2023-05-21 RX ORDER — KETOROLAC TROMETHAMINE 30 MG/ML
15 INJECTION, SOLUTION INTRAMUSCULAR; INTRAVENOUS ONCE
Status: COMPLETED | OUTPATIENT
Start: 2023-05-21 | End: 2023-05-21

## 2023-05-21 RX ORDER — HYDROMORPHONE HCL/PF 1 MG/ML
0.5 SYRINGE (ML) INJECTION ONCE
Status: DISCONTINUED | OUTPATIENT
Start: 2023-05-21 | End: 2023-05-21 | Stop reason: HOSPADM

## 2023-05-21 RX ADMIN — SODIUM CHLORIDE 1000 ML: 0.9 INJECTION, SOLUTION INTRAVENOUS at 01:05

## 2023-05-21 RX ADMIN — KETOROLAC TROMETHAMINE 15 MG: 30 INJECTION, SOLUTION INTRAMUSCULAR at 01:09

## 2023-05-21 NOTE — ED PROVIDER NOTES
History  Chief Complaint   Patient presents with   • Flank Pain     Pt reports left flank pain for past few days but has been constant for the past 24 hrs  Deny n/v/d  States she had an infected kidney stone April and needed surgery for a stent  Pain reminds here exactly in April  Hasnt been taking otc meds     46 yo female with a history of kidney stones presents to the emergency department for evaluation of left flank pain  Patient reports intermittent pain over the past few days, but it became progressively worse and constant in the past 24 hours  She does have a history of an infected kidney stone in April and states the symptoms feel similar  She denies any dysuria or hematuria  No fevers or chills  No nausea or vomiting  Prior to Admission Medications   Prescriptions Last Dose Informant Patient Reported? Taking?    Cholecalciferol (VITAMIN D3 PO)   Yes No   Sig: Take by mouth   NON FORMULARY   Yes No   Sig: Take 25 mg by mouth daily at bedtime THC gummies for sleep   Rimegepant Sulfate (Nurtec) 75 MG TBDP   No No   Sig: Take 75 mg by mouth every other day   Patient taking differently: Take 75 mg by mouth if needed   acetaminophen (TYLENOL) 325 mg tablet   No No   Sig: Take 2 tablets (650 mg total) by mouth every 6 (six) hours as needed for mild pain, fever or headaches   albuterol (PROVENTIL HFA,VENTOLIN HFA) 90 mcg/act inhaler   Yes No   Sig: Inhale 2 puffs every 6 (six) hours as needed for wheezing   atorvastatin (LIPITOR) 20 mg tablet   No No   Sig: take 1 tablet by mouth once daily   fluticasone (FLONASE) 50 mcg/act nasal spray   No No   Si sprays into each nostril daily   ibuprofen (MOTRIN) 200 mg tablet   Yes No   Sig: Take by mouth every 6 (six) hours as needed for mild pain   omeprazole (PriLOSEC) 20 mg delayed release capsule   Yes No   Sig: Take 20 mg by mouth daily as needed    polyethylene glycol (MIRALAX) 17 g packet   Yes No   Sig: Take 17 g by mouth if needed   verapamil (CALAN-SR) 240 mg CR tablet   No No   Sig: take 1 tablet by mouth once daily at bedtime   zolpidem (AMBIEN) 5 mg tablet   No No   Sig: take 1 tablet by mouth at bedtime if needed for sleep      Facility-Administered Medications: None       Past Medical History:   Diagnosis Date   • Anxiety    • Asthma     seasonal allergy induced   • Bruxism (teeth grinding)    • Claustrophobia    • Endometriosis    • Esophageal reflux    • High cholesterol    • Hypertension    • Kidney stone    • Migraines    • Plantar fasciitis    • Seasonal allergies    • Sleep apnea     can't use CPAP   • Swelling of both lower extremities    • Wears contact lenses    • Wears glasses        Past Surgical History:   Procedure Laterality Date   • BONE GRAFT      L jaw for future false tooth implant   •  SECTION     • FL RETROGRADE PYELOGRAM  2023   • FL RETROGRADE PYELOGRAM  2023   • FOOT SURGERY Left    • LAPAROSCOPY      of uterus   • NV CYSTO/URETERO W/LITHOTRIPSY &INDWELL STENT INSRT Left 2023    Procedure: CYSTOSCOPY  RETROGRADE PYELOGRAM AND INSERTION STENT URETERAL;  Surgeon: Alejandrina Leung MD;  Location: [facility];  Service: Urology   • NV CYSTO/URETERO W/LITHOTRIPSY &INDWELL STENT INSRT Left 2023    Procedure: CYSTO USCOPE W/  LASER, &STENT;  Surgeon: Jose R Romano MD;  Location: [facility];  Service: Urology   • TOOTH EXTRACTION      3 besides wisdom teeth   • WISDOM TOOTH EXTRACTION      2 of teeth       Family History   Adopted: Yes   Problem Relation Age of Onset   • Heart murmur Mother    • Depression Mother    • Other Mother         C-spine injury   • Hypertension Father    • Abdominal aortic aneurysm Father    • Colon cancer Maternal Grandmother    • No Known Problems Maternal Grandfather    • No Known Problems Paternal Grandmother    • No Known Problems Paternal Grandfather    • Asperger's syndrome Family         disorder   • Diabetes Family         DM   • Bipolar disorder Family    • Depression Family    • Hypertension Family    • Raynaud syndrome Family         phenomenon   • Cancer Maternal Uncle    • Allergies Daughter         Environmental   • Allergies Daughter         Environmental   • Raynaud syndrome Sister    • Allergies Sister    • Hypothyroidism Sister    • Asperger's syndrome Brother    • Allergies Brother    • Raynaud syndrome Sister    • Bipolar disorder Sister    • Allergies Sister      I have reviewed and agree with the history as documented  E-Cigarette/Vaping   • E-Cigarette Use Never User      E-Cigarette/Vaping Substances     Social History     Tobacco Use   • Smoking status: Never   • Smokeless tobacco: Never   Vaping Use   • Vaping Use: Never used   Substance Use Topics   • Alcohol use: Yes     Comment: rarely   • Drug use: Never     Comment: thc gummies       Review of Systems   Constitutional: Negative for chills and fever  HENT: Negative for ear pain and sore throat  Eyes: Negative for pain and visual disturbance  Respiratory: Negative for cough and shortness of breath  Cardiovascular: Negative for chest pain and palpitations  Gastrointestinal: Negative for abdominal pain and vomiting  Genitourinary: Positive for flank pain  Negative for dysuria and hematuria  Musculoskeletal: Negative for arthralgias and back pain  Skin: Negative for color change and rash  Neurological: Negative for seizures and syncope  All other systems reviewed and are negative  Physical Exam  Physical Exam  Vitals and nursing note reviewed  Constitutional:       General: She is not in acute distress  HENT:      Head: Normocephalic and atraumatic  Right Ear: External ear normal       Left Ear: External ear normal       Nose: Nose normal       Mouth/Throat:      Mouth: Mucous membranes are moist    Eyes:      Extraocular Movements: Extraocular movements intact        Conjunctiva/sclera: Conjunctivae normal       Pupils: Pupils are equal, round, and reactive to light  Cardiovascular:      Rate and Rhythm: Normal rate and regular rhythm  Pulses: Normal pulses  Pulmonary:      Effort: Pulmonary effort is normal  No respiratory distress  Breath sounds: No stridor  Abdominal:      Tenderness: There is no abdominal tenderness  There is no right CVA tenderness, left CVA tenderness, guarding or rebound  Musculoskeletal:         General: No deformity  Normal range of motion  Cervical back: Normal range of motion and neck supple  Skin:     General: Skin is warm and dry  Capillary Refill: Capillary refill takes less than 2 seconds  Neurological:      General: No focal deficit present  Mental Status: She is alert and oriented to person, place, and time     Psychiatric:         Mood and Affect: Mood normal          Behavior: Behavior normal          Vital Signs  ED Triage Vitals [05/21/23 0039]   Temperature Pulse Respirations Blood Pressure SpO2   99 1 °F (37 3 °C) 82 17 (!) 160/103 95 %      Temp Source Heart Rate Source Patient Position - Orthostatic VS BP Location FiO2 (%)   Temporal Monitor Lying Left arm --      Pain Score       7           Vitals:    05/21/23 0039   BP: (!) 160/103   Pulse: 82   Patient Position - Orthostatic VS: Lying         Visual Acuity      ED Medications  Medications   sodium chloride 0 9 % bolus 1,000 mL (0 mL Intravenous Stopped 5/21/23 0324)   ketorolac (TORADOL) injection 15 mg (15 mg Intravenous Given 5/21/23 0109)       Diagnostic Studies  Results Reviewed     Procedure Component Value Units Date/Time    Basic metabolic panel [[id number]] Collected: 05/21/23 0105    Lab Status: Final result Specimen: Blood from Arm, Right Updated: 05/21/23 0128     Sodium 140 mmol/L      Potassium 3 5 mmol/L      Chloride 104 mmol/L      CO2 27 mmol/L      ANION GAP 9 mmol/L      BUN 8 mg/dL      Creatinine 0 84 mg/dL      Glucose 91 mg/dL      Calcium 9 4 mg/dL      eGFR 80 ml/min/1 73sq m     Narrative:      National Kidney Disease Foundation guidelines for Chronic Kidney Disease (CKD):   •  Stage 1 with normal or high GFR (GFR > 90 mL/min/1 73 square meters)  •  Stage 2 Mild CKD (GFR = 60-89 mL/min/1 73 square meters)  •  Stage 3A Moderate CKD (GFR = 45-59 mL/min/1 73 square meters)  •  Stage 3B Moderate CKD (GFR = 30-44 mL/min/1 73 square meters)  •  Stage 4 Severe CKD (GFR = 15-29 mL/min/1 73 square meters)  •  Stage 5 End Stage CKD (GFR <15 mL/min/1 73 square meters)  Note: GFR calculation is accurate only with a steady state creatinine    UA w Reflex to Microscopic w Reflex to Culture [[id number]]  (Abnormal) Collected: 05/21/23 0106    Lab Status: Final result Specimen: Urine, Clean Catch Updated: 05/21/23 0113     Color, UA Straw     Clarity, UA Clear     Specific Gravity, UA <=1 005     pH, UA 6 5     Leukocytes, UA Negative     Nitrite, UA Negative     Protein, UA Negative mg/dl      Glucose, UA Negative mg/dl      Ketones, UA Negative mg/dl      Urobilinogen, UA 0 2 E U /dl      Bilirubin, UA Negative     Occult Blood, UA Negative    CBC and differential [[id number]] Collected: 05/21/23 0105    Lab Status: Final result Specimen: Blood from Arm, Right Updated: 05/21/23 0112     WBC 6 44 Thousand/uL      RBC 4 53 Million/uL      Hemoglobin 14 5 g/dL      Hematocrit 42 2 %      MCV 93 fL      MCH 32 0 pg      MCHC 34 4 g/dL      RDW 12 6 %      MPV 9 2 fL      Platelets 376 Thousands/uL      nRBC 0 /100 WBCs      Neutrophils Relative 47 %      Immat GRANS % 0 %      Lymphocytes Relative 39 %      Monocytes Relative 9 %      Eosinophils Relative 4 %      Basophils Relative 1 %      Neutrophils Absolute 3 02 Thousands/µL      Immature Grans Absolute 0 01 Thousand/uL      Lymphocytes Absolute 2 53 Thousands/µL      Monocytes Absolute 0 58 Thousand/µL      Eosinophils Absolute 0 26 Thousand/µL      Basophils Absolute 0 04 Thousands/µL                  CT renal stone study abdomen pelvis wo contrast   Final Result by Keke Valderrama MD (05/21 0220)      There is a developing staghorn calculus in the left kidney  Questionable/borderline left-sided hydronephrosis raises the possibility of a recently passed stone  The study was marked in [facility] for immediate notification  Workstation performed: [id number]                    Procedures  Procedures         ED Course  ED Course as of 05/21/23 0617   Sun May 21, 2023   0114 Leukocytes, UA: Negative   0114 Nitrite, UA: Negative   0136 Creatinine: 0 84                               SBIRT 22yo+    Flowsheet Row Most Recent Value   Initial Alcohol Screen: US AUDIT-C     1  How often do you have a drink containing alcohol? 0 Filed at: 05/21/2023 0040   2  How many drinks containing alcohol do you have on a typical day you are drinking? 0 Filed at: 05/21/2023 0040   3a  Male UNDER 65: How often do you have five or more drinks on one occasion? 0 Filed at: 05/21/2023 0040   3b  FEMALE Any Age, or MALE 65+: How often do you have 4 or more drinks on one occassion? 0 Filed at: 05/21/2023 0040   Audit-C Score 0 Filed at: 05/21/2023 0040   LAWANDA: How many times in the past year have you    Used an illegal drug or used a prescription medication for non-medical reasons? Never Filed at: 05/21/2023 0040                    Medical Decision Making  61-year-old female presents emergency department for evaluation of flank pain  On exam, she is well-appearing in no acute distress  Differential diagnosis includes but is not limited to UTI, pyelonephritis, ureterolithiasis  Will check basic labs, urinalysis, CT renal stone study  Labs grossly unremarkable  Urinalysis negative for infection or hematuria  CT scan demonstrated developing staghorn calculus in the left kidney as well as borderline hydronephrosis raising the possibility of recently passed stone  I do suspect that patient's symptomatology secondary to recently passed kidney stone    On repeat assessment symptoms well controlled at this time, will discharge home with urology follow-up  She was given strict return precautions  Left flank pain: acute illness or injury  Nephrolithiasis: acute illness or injury  Amount and/or Complexity of Data Reviewed  Labs: ordered  Decision-making details documented in ED Course  Radiology: ordered  Risk  OTC drugs  Prescription drug management  Disposition  Final diagnoses:   Left flank pain   Nephrolithiasis     Time reflects when diagnosis was documented in both MDM as applicable and the Disposition within this note     Time User Action Codes Description Comment    5/21/2023  2:54 AM Check, Brooks Oropeza Add [R10 9] Left flank pain     5/21/2023  2:54 AM Check, Brooks Oropeza Add [N20 0] Nephrolithiasis       ED Disposition     ED Disposition   Discharge    Condition   Stable    Date/Time   Sun May 21, 2023  2:54 AM    Comment   Frantz Kolb discharge to home/self care                 Follow-up Information     Follow up With Specialties Details Why [address] Emergency Department Emergency Medicine  If symptoms worsen [address] [place] [address] [phone] [facility] Emergency Department, [address] [place] [address] , [employer], [address] [facility]    Kait Alvarenga MD Urology Schedule an appointment as soon as possible for a visit   [address] [facility]  [address] [place] [address]  [phone]             Discharge Medication List as of 5/21/2023  2:56 AM      CONTINUE these medications which have NOT CHANGED    Details   acetaminophen (TYLENOL) 325 mg tablet Take 2 tablets (650 mg total) by mouth every 6 (six) hours as needed for mild pain, fever or headaches, Starting Sat 4/8/2023, Normal      albuterol (PROVENTIL HFA,VENTOLIN HFA) 90 mcg/act inhaler Inhale 2 puffs every 6 (six) hours as needed for wheezing, Historical Med      atorvastatin (LIPITOR) 20 mg tablet take 1 tablet by mouth once daily, Normal Cholecalciferol (VITAMIN D3 PO) Take by mouth, Historical Med      fluticasone (FLONASE) 50 mcg/act nasal spray 2 sprays into each nostril daily, Starting Wed 2/9/2022, Until Thu 2/9/2023, Normal      ibuprofen (MOTRIN) 200 mg tablet Take by mouth every 6 (six) hours as needed for mild pain, Historical Med      NON FORMULARY Take 25 mg by mouth daily at bedtime THC gummies for sleep, Historical Med      omeprazole (PriLOSEC) 20 mg delayed release capsule Take 20 mg by mouth daily as needed , Historical Med      polyethylene glycol (MIRALAX) 17 g packet Take 17 g by mouth if needed, Historical Med      Rimegepant Sulfate (Nurtec) 75 MG TBDP Take 75 mg by mouth every other day, Starting Fri 9/10/2021, Normal      verapamil (CALAN-SR) 240 mg CR tablet take 1 tablet by mouth once daily at bedtime, Normal      zolpidem (AMBIEN) 5 mg tablet take 1 tablet by mouth at bedtime if needed for sleep, Normal             No discharge procedures on file      PDMP Review       Value Time User    PDMP Reviewed  Yes 5/12/2023 12:22 PM Tamera Sparrow MD          ED Provider  Electronically Signed by           Louie Aly MD  05/21/23 5252

## 2023-05-21 NOTE — DISCHARGE INSTRUCTIONS
Your symptoms likely secondary to recently passed stone, continue with ibuprofen or naproxen as needed for pain  Follow-up with urology  Return for any worsening symptoms including worsening pain, uncontrollable vomiting, or if you develop fevers or difficulty urinating

## 2023-06-27 ENCOUNTER — HOSPITAL ENCOUNTER (OUTPATIENT)
Dept: RADIOLOGY | Facility: HOSPITAL | Age: 53
Discharge: HOME | End: 2023-06-27
Payer: COMMERCIAL

## 2023-06-27 DIAGNOSIS — N20.1 URETERAL CALCULUS, LEFT: ICD-10-CM

## 2023-06-27 PROCEDURE — 74018 RADEX ABDOMEN 1 VIEW: CPT

## 2023-07-03 ENCOUNTER — HOSPITAL ENCOUNTER (OUTPATIENT)
Dept: MAMMOGRAPHY | Facility: HOSPITAL | Age: 53
Discharge: HOME | End: 2023-07-03
Payer: COMMERCIAL

## 2023-07-03 VITALS — WEIGHT: 196 LBS | BODY MASS INDEX: 31.5 KG/M2 | HEIGHT: 66 IN

## 2023-07-03 DIAGNOSIS — Z12.31 VISIT FOR SCREENING MAMMOGRAM: ICD-10-CM

## 2023-07-03 PROCEDURE — 77063 BREAST TOMOSYNTHESIS BI: CPT

## 2023-07-03 PROCEDURE — 77067 SCR MAMMO BI INCL CAD: CPT

## 2023-07-06 ENCOUNTER — OFFICE VISIT (OUTPATIENT)
Dept: UROLOGY | Facility: CLINIC | Age: 53
End: 2023-07-06
Payer: COMMERCIAL

## 2023-07-06 VITALS
HEART RATE: 68 BPM | WEIGHT: 195 LBS | DIASTOLIC BLOOD PRESSURE: 80 MMHG | SYSTOLIC BLOOD PRESSURE: 128 MMHG | BODY MASS INDEX: 31.47 KG/M2

## 2023-07-06 DIAGNOSIS — R10.9 FLANK PAIN: Primary | ICD-10-CM

## 2023-07-06 PROCEDURE — 99213 OFFICE O/P EST LOW 20 MIN: CPT

## 2023-07-06 NOTE — PROGRESS NOTES
7/6/2023    No chief complaint on file. Assessment and Plan    47 y.o. female     1. Ureterolithiasis  · She is status post cystoscopy, left ureteroscopy with laser lithotripsy, left ureteral stent placement by Dr. Karly Brothers on 5/1/2023. · Postoperative KUB completed on 6/27/2023 showed no radiopaque urinary tract calculi bilaterally. · However, upon further review of recent imaging patient had a CT renal stone study completed on 5/21/2023 after complaints of persistent left flank pain following ureteral stent removal earlier that month. Radiology reported a developing staghorn calculus in the left kidney. Questionable/borderline left-sided hydronephrosis raises the possibility of a recently passed stone. I personally reviewed these images as well as prior CT imaging from 4/07/2023 and I have concerned that this is not a developing staghorn calculi, rather I suspect she may have a partially retained ureteral stent. · Patient had removed her ureteral stent at home by herself on 5/4 and is unable to recall if this was removed in its entirety. She has never removed a ureteral stent before in the past.  She does continue to complain of persistent left flank pain but denies any fevers or chills. · I am recommending that we repeat a CT renal stone study for reevaluation of the questionable staghorn calculi versus retained ureteral stent. We will try to have this done within 1 week and I will call her with those results. I did advise that she will likely need to undergo repeat cystoscopy, left ureteroscopy if this is indeed a retained ureteral stent. Subjective:    History of Present Illness  Glenn Islas is a 47 y.o. female here for follow-up evaluation status post cystoscopy, left ureteroscopy with laser lithotripsy, left ureteral stent placement by Dr. Karly Brothers on 5/1/2023.     She was originally seen in consultation during hospitalization in April when she had presented to [address] [place] [address]” [place] on 4/07/2023 due to left-sided flank pain x4 days with associated nausea, vomiting, increased urinary frequency. Medical expulsive therapy was attempted in the emergency room however patient's pain was uncontrolled requiring transfer to [address] [place] [address] for further evaluation. Patient would undergo cystoscopy, left ureteral stent placement by Dr. Dion Burgos on 4/7/2023. She would later undergo definitive stone intervention with cystoscopy, left ureteroscopy with laser lithotripsy, and left ureteral stent placement by Dr. Erik Lema on 5/1/2023. Ureteral stent with string was removed on 5/5/2023. Postop KUB from 6/27/2023 shows no radiopaque urinary tract calculi bilaterally. Upon further review of imaging patient had a CT renal stone study completed on 5/21/2023 per radiology impression reporting developing staghorn calculus in the left kidney. Questionable/borderline left-sided hydronephrosis raising the possibility of a recently passed stone. Review of Systems   Constitutional: Negative for chills and fever. HENT: Negative for ear pain and sore throat. Eyes: Negative for pain and visual disturbance. Respiratory: Negative for cough and shortness of breath. Cardiovascular: Negative for chest pain and palpitations. Gastrointestinal: Negative for abdominal pain, constipation, diarrhea, nausea and vomiting. Genitourinary: Positive for flank pain (left). Negative for dysuria and hematuria. Musculoskeletal: Negative for arthralgias and back pain. Skin: Negative for color change and rash. Neurological: Negative for dizziness, seizures, syncope and weakness. All other systems reviewed and are negative. Vitals  There were no vitals filed for this visit. Physical Exam  Vitals reviewed. Constitutional:       General: She is not in acute distress. Appearance: Normal appearance. She is normal weight.  She is not ill-appearing or toxic-appearing. HENT:      Head: Normocephalic and atraumatic. Nose: Nose normal.   Eyes:      General: No scleral icterus. Conjunctiva/sclera: Conjunctivae normal.   Cardiovascular:      Rate and Rhythm: Normal rate. Pulses: Normal pulses. Pulmonary:      Effort: Pulmonary effort is normal. No respiratory distress. Abdominal:      General: Abdomen is flat. Palpations: Abdomen is soft. Tenderness: There is no abdominal tenderness. There is no right CVA tenderness or left CVA tenderness. Hernia: No hernia is present. Musculoskeletal:         General: Normal range of motion. Cervical back: Normal range of motion. Skin:     General: Skin is warm and dry. Neurological:      General: No focal deficit present. Mental Status: She is alert and oriented to person, place, and time. Mental status is at baseline. Psychiatric:         Mood and Affect: Mood normal.         Behavior: Behavior normal.         Thought Content:  Thought content normal.         Judgment: Judgment normal.         Past History  Past Medical History:   Diagnosis Date   • Anxiety    • Asthma     seasonal allergy induced   • Bruxism (teeth grinding)    • Claustrophobia    • Endometriosis    • Esophageal reflux    • High cholesterol    • Hypertension    • Kidney stone    • Migraines    • Plantar fasciitis    • Seasonal allergies    • Sleep apnea     can't use CPAP   • Swelling of both lower extremities    • Wears contact lenses    • Wears glasses      Social History     Socioeconomic History   • Marital status: /Civil Union     Spouse name: Andreas   • Number of children: 2   • Years of education: Not on file   • Highest education level: Not on file   Occupational History   • Occupation:    Tobacco Use   • Smoking status: Never   • Smokeless tobacco: Never   Vaping Use   • Vaping Use: Never used   Substance and Sexual Activity   • Alcohol use: Yes     Comment: rarely   • Drug use: Never     Comment: thc gummies   • Sexual activity: Yes     Partners: Male   Other Topics Concern   • Not on file   Social History Narrative    Caffeine use        House built in [address] source of heat is coal stove, also has wood burning stove for extreme cold temps and as backup has electric baseboard heat. [facility], [place]    Hardwood floor with area rug in bedroom    Dehumidifier in basement. Basement is dry, finished, some musty smell after rain and sometimes when [facility] turns off before starting up coal stove. No humidifier or air purifier.     Lives with  and 2 daughters        Pets - 3 dogs - Jeet, Sera, Brodie            Caffeine - Coffee 1 large every morning    Tea - iced - 5 to 6 days a week - 2 cups a day    Soda - rarely    Chocolate - small amount occasionally     Social Determinants of Health     Financial Resource Strain: Not on file   Food Insecurity: Not on file   Transportation Needs: Not on file   Physical Activity: Inactive (8/25/2020)    Exercise Vital Sign    • Days of Exercise per Week: 0 days    • Minutes of Exercise per Session: 0 min   Stress: Not on file   Social Connections: Not on file   Intimate Partner Violence: Not on file   Housing Stability: Not on file     Social History     Tobacco Use   Smoking Status Never   Smokeless Tobacco Never     Family History   Adopted: Yes   Problem Relation Age of Onset   • Heart murmur Mother    • Depression Mother    • Other Mother         C-spine injury   • Pancreatic cancer Mother 70 april 56 diagnosed   • Hypertension Father    • Abdominal aortic aneurysm Father    • Raynaud syndrome Sister    • Allergies Sister    • Hypothyroidism Sister    • Raynaud syndrome Sister    • Bipolar disorder Sister    • Allergies Sister    • Allergies Daughter         Environmental   • Allergies Daughter         Environmental   • Colon cancer Maternal Grandmother    • No Known Problems Maternal Grandfather    • No Known Problems Paternal Grandmother    • No Known Problems Paternal Grandfather    • Asperger's syndrome Brother    • Allergies Brother    • Cancer Maternal Uncle    • Asperger's syndrome Family         disorder   • Diabetes Family         DM   • Bipolar disorder Family    • Depression Family    • Hypertension Family    • Raynaud syndrome Family         phenomenon       The following portions of the patient's history were reviewed and updated as appropriate allergies, current medications, past medical history, past social history, past surgical history and problem list    [id number]  ABDOMEN     INDICATION:   N20.1: Calculus of ureter.     COMPARISON: CT 2023     VIEWS:  AP supine        FINDINGS:     No radiopaque renal calculi seen.     No radiopaque ureteral calculi identified.     Nonobstructive bowel gas pattern.     No acute osseous abnormality is seen.     IMPRESSION:     No radiopaque urinary tract calculi.          2023  CT ABDOMEN AND PELVIS WITHOUT IV CONTRAST - LOW DOSE RENAL STONE     INDICATION:   Flank pain, kidney stone suspected  flank pain.     COMPARISON: 2023.     TECHNIQUE:  Low radiation dose thin section CT examination of the abdomen and pelvis was performed without intravenous or oral contrast according to a protocol specifically designed to evaluate for urinary tract calculus. Axial, sagittal, and coronal 2D   reformatted images were created from the source data and submitted for interpretation. Evaluation for pathology in the abdomen and pelvis that is unrelated to urinary tract calculi is limited.     Radiation dose length product (DLP) for this visit:  362.49 mGy-cm .   This examination, like all CT scans performed in the [facility], was performed utilizing techniques to minimize radiation dose exposure, including the use of iterative   reconstruction and automated exposure control.     URINARY TRACT FINDINGS:     RIGHT KIDNEY AND URETER:  No urinary tract calculi. No hydronephrosis or hydroureter.     LEFT KIDNEY AND URETER: There is a developing staghorn calculus in the left kidney. Questionable/borderline left-sided hydronephrosis raises the possibility of a recently passed stone.     URINARY BLADDER:  Unremarkable.        ADDITIONAL FINDINGS:     LOWER CHEST:  No clinically significant abnormality identified in the visualized lower chest.     SOLID VISCERA: Limited low radiation dose noncontrast CT evaluation demonstrates no clinically significant abnormality of the imaged portions of the liver, spleen, pancreas, or adrenal glands.     GALLBLADDER/BILIARY TREE:  No calcified gallstones. No pericholecystic inflammatory change. No biliary dilatation.     STOMACH AND BOWEL: There is colonic diverticulosis without evidence of acute diverticulitis. Residual oral contrast is seen within the colon.     APPENDIX: A normal appendix was visualized.     ABDOMINOPELVIC CAVITY:  No ascites. No pneumoperitoneum. No lymphadenopathy.     REPRODUCTIVE ORGANS:  Unremarkable for patient's age.     ABDOMINAL WALL/INGUINAL REGIONS:  Unremarkable.     OSSEOUS STRUCTURES:  No acute fracture or destructive osseous lesion.     IMPRESSION:     There is a developing staghorn calculus in the left kidney. Questionable/borderline left-sided hydronephrosis raises the possibility of a recently passed stone.     The study was marked in EPIC for immediate notification. Results  No results found for this or any previous visit (from the past 1 hour(s)). ]  No results found for: "PSA"  Lab Results   Component Value Date    CALCIUM 9.4 05/21/2023     12/09/2017    K 3.5 05/21/2023    CO2 27 05/21/2023     05/21/2023    BUN 8 05/21/2023    CREATININE 0.84 05/21/2023     Lab Results   Component Value Date    WBC 6.44 05/21/2023    HGB 14.5 05/21/2023    HCT 42.2 05/21/2023    MCV 93 05/21/2023     05/21/2023       Please Note:  Voice dictation software has been used to create this document. There may be inadvertent transcriptions errors.      GWEN Bright 07/06/23

## 2023-07-10 ENCOUNTER — TELEPHONE (OUTPATIENT)
Dept: UROLOGY | Facility: AMBULATORY SURGERY CENTER | Age: 53
End: 2023-07-10

## 2023-07-10 ENCOUNTER — HOSPITAL ENCOUNTER (OUTPATIENT)
Dept: CT IMAGING | Facility: HOSPITAL | Age: 53
Discharge: HOME | End: 2023-07-10
Payer: COMMERCIAL

## 2023-07-10 DIAGNOSIS — R10.9 FLANK PAIN: Primary | ICD-10-CM

## 2023-07-10 DIAGNOSIS — R10.9 FLANK PAIN: ICD-10-CM

## 2023-07-10 PROCEDURE — G1004 CDSM NDSC: HCPCS

## 2023-07-10 PROCEDURE — 74176 CT ABD & PELVIS W/O CONTRAST: CPT

## 2023-07-10 NOTE — TELEPHONE ENCOUNTER
Patient under care of Sydnie ARIAS    Reason for call: she's still in a lot of pain    Patient symptoms are: kidney are pain that radiates to the side. It feels like she got kicked by a horse. Also getting hot flashes and non-stop migraines.     Patient can be reached at [phone]

## 2023-07-10 NOTE — TELEPHONE ENCOUNTER
Returned call to [facility]. She reports pain in the L flank area. Denies fevers/chills,  hematuria, or difficulty urinating. Does report intermittent hot flashes but unsure if these are hormonal. Recent imaging showed concern for possible retained portion of stent and patient is currently scheduled for CT renal stone study on 7/12. Auth on file for CT scan. Call placed to Central Scheduling and move patients CT scan up to this evening at 5:30 pm at the [facility]. Patient aware. She will continue hydrating well, taking prn Ibuprofen as ordered, and using heating pad. Will postpone task until tomorrow to contact radiology to have imaging read.

## 2023-07-11 ENCOUNTER — TELEPHONE (OUTPATIENT)
Dept: UROLOGY | Facility: CLINIC | Age: 53
End: 2023-07-11

## 2023-07-11 DIAGNOSIS — R10.9 FLANK PAIN: ICD-10-CM

## 2023-07-11 RX ORDER — OXYCODONE HYDROCHLORIDE 5 MG/1
5 TABLET ORAL EVERY 4 HOURS PRN
Qty: 10 TABLET | Refills: 0 | Status: SHIPPED | OUTPATIENT
Start: 2023-07-11 | End: 2023-07-11

## 2023-07-11 RX ORDER — OXYCODONE HYDROCHLORIDE 5 MG/1
5 TABLET ORAL EVERY 4 HOURS PRN
Qty: 10 TABLET | Refills: 0 | Status: SHIPPED | OUTPATIENT
Start: 2023-07-11

## 2023-07-11 NOTE — TELEPHONE ENCOUNTER
Patient returning missed call from [facility].     Transferred call to [facility]

## 2023-07-11 NOTE — TELEPHONE ENCOUNTER
Called and [facility] for patient to return call to discuss CT scan results and GWEN Brink's note.

## 2023-07-11 NOTE — TELEPHONE ENCOUNTER
I reviewed updated CT imaging which again demonstrates a calcification within the left kidney. Radiology is reading this as a possible peripherally calcified cyst or developing staghorn calculus. I personally reviewed these images with Dr. Nai Luciano who agrees that there is concern for possible retained ureteral stent. I would recommend that we schedule patient for cystoscopy, left ureteroscopy for further evaluation of this. I have placed a case request.  Please review ER precautions with patient. I have also placed orders for urine testing to ensure no infection.   If patient needs pain medication I can send something to her pharmacy per her request.

## 2023-07-11 NOTE — TELEPHONE ENCOUNTER
Received transferred call from [facility]. Spoke with patient to relay CT scan results and GWEN Cruz's recommendations. Patient agreeable to case request for procedure and is aware Leroy Carbajal will contact patient in the next week or 2 to schedule OR date. Patient will leave a urine sample at the lab to rule out infection. She would like prn pain [facility] [address] [facility] for moderate to severe pain. ER precautions reviewed with patient.

## 2023-07-19 NOTE — TELEPHONE ENCOUNTER
Patient called stating she would like to know how soon she is to be schedule for procedure.     Patient can be reached at [phone]

## 2023-07-20 NOTE — TELEPHONE ENCOUNTER
Spoke to pt, advised waiting to hear back from Dr Owen Armas.  Currently holding 8/15 SH day for her procedure

## 2023-07-21 ENCOUNTER — TELEPHONE (OUTPATIENT)
Dept: UROLOGY | Facility: CLINIC | Age: 53
End: 2023-07-21

## 2023-07-21 NOTE — TELEPHONE ENCOUNTER
Spoke to pt, scheduled OR left #5 with Dr Jason Min on 8/15 at [facility]. Emailed all instructions, directions.

## 2023-07-22 ENCOUNTER — APPOINTMENT (OUTPATIENT)
Dept: LAB | Facility: HOSPITAL | Age: 53
End: 2023-07-22
Payer: COMMERCIAL

## 2023-07-22 DIAGNOSIS — E78.2 MIXED HYPERLIPIDEMIA: ICD-10-CM

## 2023-07-22 DIAGNOSIS — E87.6 HYPOKALEMIA: ICD-10-CM

## 2023-07-22 DIAGNOSIS — R10.9 FLANK PAIN: ICD-10-CM

## 2023-07-22 DIAGNOSIS — E55.9 VITAMIN D DEFICIENCY: ICD-10-CM

## 2023-07-22 DIAGNOSIS — I10 PRIMARY HYPERTENSION: ICD-10-CM

## 2023-07-22 DIAGNOSIS — J30.1 CHRONIC SEASONAL ALLERGIC RHINITIS DUE TO POLLEN: ICD-10-CM

## 2023-07-22 LAB
ALBUMIN SERPL BCP-MCNC: 4.2 G/DL (ref 3.5–5)
ALP SERPL-CCNC: 74 U/L (ref 34–104)
ALT SERPL W P-5'-P-CCNC: 9 U/L (ref 7–52)
ANION GAP SERPL CALCULATED.3IONS-SCNC: 10 MMOL/L
AST SERPL W P-5'-P-CCNC: 14 U/L (ref 13–39)
BASOPHILS # BLD AUTO: 0.05 THOUSANDS/ÂΜL (ref 0–0.1)
BASOPHILS NFR BLD AUTO: 1 % (ref 0–1)
BILIRUB SERPL-MCNC: 0.56 MG/DL (ref 0.2–1)
BUN SERPL-MCNC: 11 MG/DL (ref 5–25)
CALCIUM SERPL-MCNC: 9.5 MG/DL (ref 8.4–10.2)
CHLORIDE SERPL-SCNC: 100 MMOL/L (ref 96–108)
CHOLEST SERPL-MCNC: 243 MG/DL
CO2 SERPL-SCNC: 29 MMOL/L (ref 21–32)
CREAT SERPL-MCNC: 0.92 MG/DL (ref 0.6–1.3)
EOSINOPHIL # BLD AUTO: 0.24 THOUSAND/ÂΜL (ref 0–0.61)
EOSINOPHIL NFR BLD AUTO: 4 % (ref 0–6)
ERYTHROCYTE [DISTWIDTH] IN BLOOD BY AUTOMATED COUNT: 12 % (ref 11.6–15.1)
GFR SERPL CREATININE-BSD FRML MDRD: 72 ML/MIN/1.73SQ M
GLUCOSE P FAST SERPL-MCNC: 86 MG/DL (ref 65–99)
HCT VFR BLD AUTO: 42.5 % (ref 34.8–46.1)
HDLC SERPL-MCNC: 36 MG/DL
HGB BLD-MCNC: 14.7 G/DL (ref 11.5–15.4)
IMM GRANULOCYTES # BLD AUTO: 0.01 THOUSAND/UL (ref 0–0.2)
IMM GRANULOCYTES NFR BLD AUTO: 0 % (ref 0–2)
LDLC SERPL CALC-MCNC: 158 MG/DL (ref 0–100)
LYMPHOCYTES # BLD AUTO: 2.61 THOUSANDS/ÂΜL (ref 0.6–4.47)
LYMPHOCYTES NFR BLD AUTO: 43 % (ref 14–44)
MCH RBC QN AUTO: 31.8 PG (ref 26.8–34.3)
MCHC RBC AUTO-ENTMCNC: 34.6 G/DL (ref 31.4–37.4)
MCV RBC AUTO: 92 FL (ref 82–98)
MONOCYTES # BLD AUTO: 0.55 THOUSAND/ÂΜL (ref 0.17–1.22)
MONOCYTES NFR BLD AUTO: 9 % (ref 4–12)
NEUTROPHILS # BLD AUTO: 2.57 THOUSANDS/ÂΜL (ref 1.85–7.62)
NEUTS SEG NFR BLD AUTO: 43 % (ref 43–75)
NONHDLC SERPL-MCNC: 207 MG/DL
NRBC BLD AUTO-RTO: 0 /100 WBCS
PLATELET # BLD AUTO: 336 THOUSANDS/UL (ref 149–390)
PMV BLD AUTO: 8.6 FL (ref 8.9–12.7)
POTASSIUM SERPL-SCNC: 3.4 MMOL/L (ref 3.5–5.3)
PROT SERPL-MCNC: 7.5 G/DL (ref 6.4–8.4)
RBC # BLD AUTO: 4.62 MILLION/UL (ref 3.81–5.12)
SODIUM SERPL-SCNC: 139 MMOL/L (ref 135–147)
TRIGL SERPL-MCNC: 245 MG/DL
TSH SERPL DL<=0.05 MIU/L-ACNC: 1.81 UIU/ML (ref 0.45–4.5)
WBC # BLD AUTO: 6.03 THOUSAND/UL (ref 4.31–10.16)

## 2023-07-22 PROCEDURE — 87086 URINE CULTURE/COLONY COUNT: CPT

## 2023-07-22 PROCEDURE — 84443 ASSAY THYROID STIM HORMONE: CPT

## 2023-07-22 PROCEDURE — 82306 VITAMIN D 25 HYDROXY: CPT

## 2023-07-22 PROCEDURE — 85025 COMPLETE CBC W/AUTO DIFF WBC: CPT

## 2023-07-22 PROCEDURE — 80061 LIPID PANEL: CPT

## 2023-07-22 PROCEDURE — 80053 COMPREHEN METABOLIC PANEL: CPT

## 2023-07-22 PROCEDURE — 36415 COLL VENOUS BLD VENIPUNCTURE: CPT

## 2023-07-23 LAB — 25(OH)D3 SERPL-MCNC: 26.1 NG/ML (ref 30–100)

## 2023-07-23 PROCEDURE — NC001 PR NO CHARGE: Performed by: UROLOGY

## 2023-07-23 NOTE — DISCHARGE INSTRUCTIONS
Ureteral Stent Placement   WHAT YOU NEED TO KNOW:   Ureteral stent placement is a procedure to open a blocked or narrow ureter. The ureter is the tube that carries urine from your kidney into your bladder. A stent is a thin hollow plastic tube used to hold your ureter open and allow urine to flow. The stent may stay in for several weeks. Long-term stents will stay in longer and need to be replaced within a certain period of time. DISCHARGE INSTRUCTIONS:   Seek care immediately if:   You urinate very little or not at all. You have severe pain in your abdomen, even after you take medicine. You have heavy bleeding from your urethra. You see large blood clots in your urine, or your urine is bright red. Contact your healthcare provider or urologist if:   You have a fever or chills. You feel like you need to urinate very often. Your symptoms get worse, or you develop new symptoms. You have questions or concerns about your condition or care. Medicines: You may  need any of the following:  Acetaminophen  decreases pain and fever. It is available without a doctor's order. Ask how much to take and how often to take it. Follow directions. Read the labels of all other medicines you are using to see if they also contain acetaminophen, or ask your doctor or pharmacist. Acetaminophen can cause liver damage if not taken correctly. Prescription pain medicine  may be given. Ask your healthcare provider how to take this medicine safely. Some prescription pain medicines contain acetaminophen. Do not take other medicines that contain acetaminophen without talking to your healthcare provider. Too much acetaminophen may cause liver damage. Prescription pain medicine may cause constipation. Ask your healthcare provider how to prevent or treat constipation. Antibiotics  help prevent or treat bacterial infections. Your healthcare provider may prescribe these for you while you have a ureteral stent.      Take your medicine as directed. Contact your healthcare provider if you think your medicine is not helping or if you have side effects. Tell your provider if you are allergic to any medicine. Keep a list of the medicines, vitamins, and herbs you take. Include the amounts, and when and why you take them. Bring the list or the pill bottles to follow-up visits. Carry your medicine list with you in case of an emergency. Self-care:   Drink liquids as directed. Liquids will help flush your urinary tract and prevent infection. Ask your healthcare provider how much liquid to drink each day and which liquids are best for you. Ask when you can return to daily activities. You may be able to return to normal activities the day after your stent placement. Follow up with your urologist as directed: You will need regular follow-up visits with your urologist as long as you have a stent. He or she will check to make sure the stent is working properly. He or she will remove your temporary stent in several weeks. Your provider may do urine cultures to check for infection. Write down your questions so you remember to ask them during your visits. © Copyright [employer] Ver[employer] 2022 Information is for End User's use only and may not be sold, redistributed or otherwise used for commercial purposes. The above information is an  only. It is not intended as medical advice for individual conditions or treatments. Talk to your doctor, nurse or pharmacist before following any medical regimen to see if it is safe and effective for you.

## 2023-07-23 NOTE — H&P
H&P Exam - Urology   Mónica Crews 47 y.o. female MRN: [id number]  Unit/Bed#:  Encounter: [id number]    Assessment/Plan     Assessment:   Left renal stone  Plan:  Cystoscopy left retrograde pyelogram left ureteroscopic holmium laser lithotripsy and stent insertion    History of Present Illness   HPI:  Mónica Crews is a 47 y.o. female who usually presented with a 6 mm obstructing left proximal ureteral calculus with hydronephrosis. The patient underwent cystoscopy left retrograde pyelography and left ureteral stent placement by Dr. Arleen Lucia on 4/7/2023. Then on 5/1/2023 the patient underwent cystoscopy left ureteroscopic holmium laser lithotripsy of the proximal ureteral stone with stent placement. This was performed by Dr. Charles Moody.  In his operative note he noted that an 8.5 Khmer flexible ureteroscope was placed and the stone was identified within a midpole renal calyx and the holmium laser fiber was placed with laser lithotripsy performed and a 0 tip stone basket used for stone retrieval.  Retrograde pyelogram revealed no filling defects in the stone was felt to be fragmented and evacuated from the urinary tract. The patient was left with a ureteral stent in the left ureter with a Dangler suture with the patient removing her ureteral stent via the Dangler suture approximately 1 week thereafter. The patient apparently had an abdominal KUB which revealed no radiopaque residual stones. Patient complains of some left flank pain and CT 5/21/2023 with confirmatory July 2023 CT revealed radiopacities in the left renal lower pole/interpolar region. This was described as a curvilinear calcification in the question as to whether there was a calcification in the wall of the cyst or developing staghorn calculus and brought up by radiology.     Review of Systems    Historical Information My personal review of the CT reveals a curvilinear calcification in the lower pole and interpolar region of the left kidney however on sagittal and axial CT imaging calcification appears to be more consistent with fragments from previous laser lithotripsy and not indicative of a developing Staghorn nor retained portion of the ureteral stent. I met the patient in the holding area discussed the proposed procedure step-by-step performed history and physical answered all patient questions and discussed potential complications of retained stone or stone fragments perforation bleeding stricture need for ureteral stent postoperatively possible need for additional procedures. The patient expressed understanding and provided both verbal and signed informed consent.   Past Medical History:   Diagnosis Date   • Anxiety    • Asthma     seasonal allergy induced   • Bruxism (teeth grinding)    • Claustrophobia    • Endometriosis    • Esophageal reflux    • High cholesterol    • Hypertension    • Kidney stone    • Migraines    • Plantar fasciitis    • Seasonal allergies    • Sleep apnea     can't use CPAP   • Swelling of both lower extremities    • Wears contact lenses    • Wears glasses      Past Surgical History:   Procedure Laterality Date   • BONE GRAFT      L jaw for future false tooth implant   •  SECTION     • FL RETROGRADE PYELOGRAM  2023   • FL RETROGRADE PYELOGRAM  2023   • FOOT SURGERY Left    • LAPAROSCOPY      of uterus   • DE CYSTO/URETERO W/LITHOTRIPSY &INDWELL STENT INSRT Left 2023    Procedure: CYSTOSCOPY  RETROGRADE PYELOGRAM AND INSERTION STENT URETERAL;  Surgeon: Paula Gandhi MD;  Location: [facility];  Service: Urology   • DE CYSTO/URETERO W/LITHOTRIPSY &INDWELL STENT INSRT Left 2023    Procedure: Kati Rose W/  LASER, &STENT;  Surgeon: Anderson Perea MD;  Location: [facility];  Service: Urology   • TOOTH EXTRACTION      3 besides wisdom teeth   • WISDOM TOOTH EXTRACTION      2 of teeth     Social History   Social History     Substance and Sexual Activity   Alcohol Use Yes    Comment: rarely     Social History     Substance and Sexual Activity   Drug Use Never    Comment: thc gummies     Social History     Tobacco Use   Smoking Status Never   Smokeless Tobacco Never     Family History:   Family History   Adopted: Yes   Problem Relation Age of Onset   • Heart murmur Mother    • Depression Mother    • Other Mother         C-spine injury   • Pancreatic cancer Mother 68        april 2023 diagnosed   • Hypertension Father    • Abdominal aortic aneurysm Father    • Raynaud syndrome Sister    • Allergies Sister    • Hypothyroidism Sister    • Raynaud syndrome Sister    • Bipolar disorder Sister    • Allergies Sister    • Allergies Daughter         Environmental   • Allergies Daughter         Environmental   • Colon cancer Maternal Grandmother    • No Known Problems Maternal Grandfather    • No Known Problems Paternal Grandmother    • No Known Problems Paternal Grandfather    • Asperger's syndrome Brother    • Allergies Brother    • Cancer Maternal Uncle    • Asperger's syndrome Family         disorder   • Diabetes Family         DM   • Bipolar disorder Family    • Depression Family    • Hypertension Family    • Raynaud syndrome Family         phenomenon       Meds/Allergies   all medications and allergies reviewed  Allergies   Allergen Reactions   • Gabapentin Swelling       Objective   Vitals: There were no vitals taken for this visit. No intake/output data recorded. Invasive Devices     Peripheral Intravenous Line  Duration           Peripheral IV 05/21/23 Right Antecubital 63 days          Drain  Duration           Ureteral Internal Stent Left ureter 6 Fr. 83 days                Physical Exam  Vitals reviewed. Constitutional:       General: She is not in acute distress. Appearance: Normal appearance. She is not ill-appearing, toxic-appearing or diaphoretic. HENT:      Head: Normocephalic and atraumatic.       Nose: Nose normal.      Mouth/Throat:      Mouth: Mucous membranes are moist. Eyes:      Extraocular Movements: Extraocular movements intact. Cardiovascular:      Rate and Rhythm: Normal rate and regular rhythm. Heart sounds: Normal heart sounds. Pulmonary:      Effort: Pulmonary effort is normal. No respiratory distress. Breath sounds: No wheezing, rhonchi or rales. Abdominal:      General: There is no distension. Palpations: Abdomen is soft. Tenderness: There is no abdominal tenderness. There is no guarding or rebound. Musculoskeletal:         General: Normal range of motion. Cervical back: Normal range of motion and neck supple. Skin:     General: Skin is dry. Neurological:      Mental Status: She is alert and oriented to person, place, and time. Psychiatric:         Mood and Affect: Mood normal.         Behavior: Behavior normal.         Thought Content: Thought content normal.         Judgment: Judgment normal.         Lab Results: I have personally reviewed pertinent reports. Imaging: I have personally reviewed pertinent reports. and I have personally reviewed pertinent films in PACS  EKG, Pathology, and Other Studies: I have personally reviewed pertinent reports. and I have personally reviewed pertinent films in PACS  VTE Prophylaxis: Sequential compression device (Venodyne)  and Heparin    Code Status: Prior  Advance Directive and Living Will:      Power of :    POLST:      Counseling / Coordination of Care  Total floor / unit time spent today 30 minutes. Greater than 50% of total time was spent with the patient and / or family counseling and / or coordination of care. A description of the counseling / coordination of care: Cassandra Delaney

## 2023-07-24 ENCOUNTER — TELEPHONE (OUTPATIENT)
Dept: UROLOGY | Facility: CLINIC | Age: 53
End: 2023-07-24

## 2023-07-24 DIAGNOSIS — N20.1 URETERAL CALCULUS, LEFT: Primary | ICD-10-CM

## 2023-07-24 LAB — BACTERIA UR CULT: NORMAL

## 2023-07-24 NOTE — TELEPHONE ENCOUNTER
----- Message from Beronica Verma sent at 7/24/2023  9:42 AM EDT -----  Please let patient know that her preop urine culture was completed 1 week early. She will need to have a repeat urine culture completed no sooner than 2 weeks prior to her upcoming surgery. This urine culture is negative.

## 2023-07-24 NOTE — TELEPHONE ENCOUNTER
Called patient to let her know that her preop urine culture was completed 1 week early. I informed the pt that her urine culture came back negative but, she will need to get a repeat urine culture 2 weeks prior to her upcoming surgery. Pt confirmed understanding.

## 2023-07-26 DIAGNOSIS — G47.00 INSOMNIA, UNSPECIFIED TYPE: ICD-10-CM

## 2023-07-26 RX ORDER — ZOLPIDEM TARTRATE 5 MG/1
TABLET ORAL
Qty: 30 TABLET | Refills: 1 | Status: SHIPPED | OUTPATIENT
Start: 2023-07-26

## 2023-07-27 DIAGNOSIS — F41.8 DEPRESSION WITH ANXIETY: Primary | ICD-10-CM

## 2023-07-27 RX ORDER — DULOXETIN HYDROCHLORIDE 30 MG/1
30 CAPSULE, DELAYED RELEASE ORAL DAILY
Qty: 30 CAPSULE | Refills: 5 | Status: SHIPPED | OUTPATIENT
Start: 2023-07-27

## 2023-08-09 ENCOUNTER — LAB (OUTPATIENT)
Dept: LAB | Facility: CLINIC | Age: 53
End: 2023-08-09
Payer: COMMERCIAL

## 2023-08-09 DIAGNOSIS — N20.1 URETERAL CALCULUS, LEFT: ICD-10-CM

## 2023-08-09 PROCEDURE — 87086 URINE CULTURE/COLONY COUNT: CPT

## 2023-08-10 DIAGNOSIS — N20.1 URETERAL CALCULUS, LEFT: Primary | ICD-10-CM

## 2023-08-10 LAB — BACTERIA UR CULT: NORMAL

## 2023-08-10 NOTE — RESULT ENCOUNTER NOTE
Please let patient know that her preop urine culture shows low colony count of mixed contaminants likely negative for infection and secondary to ureteral stent. I would like for her to have repeat urine culture today as she is scheduled for surgery on 8/15.

## 2023-08-10 NOTE — PRE-PROCEDURE INSTRUCTIONS
Pre-Surgery Instructions:   Medication Instructions   • acetaminophen (TYLENOL) 325 mg tablet Uses PRN- OK to take day of surgery   • albuterol (PROVENTIL HFA,VENTOLIN HFA) 90 mcg/act inhaler Uses PRN- OK to take day of surgery   • atorvastatin (LIPITOR) 20 mg tablet Take day of surgery. • Cholecalciferol (VITAMIN D3 PO) Hold day of surgery. • DULoxetine (CYMBALTA) 30 mg delayed release capsule Take day of surgery. • fluticasone (FLONASE) 50 mcg/act nasal spray Uses PRN- OK to take day of surgery   • ibuprofen (MOTRIN) 200 mg tablet Stop taking 7 days prior to surgery. • NON FORMULARY Take night before surgery   • omeprazole (PriLOSEC) 20 mg delayed release capsule Take day of surgery. • oxyCODONE (Roxicodone) 5 immediate release tablet Uses PRN- OK to take day of surgery   • polyethylene glycol (MIRALAX) 17 g packet Hold day of surgery. • Rimegepant Sulfate (Nurtec) 75 MG TBDP Uses PRN- OK to take day of surgery   • verapamil (CALAN-SR) 240 mg CR tablet Take night before surgery   • zolpidem (AMBIEN) 5 mg tablet Take night before surgery        Medication instructions for day surgery reviewed. Please use only a sip of water to take your instructed medications. Avoid all over the counter vitamins, supplements and NSAIDS for one week prior to surgery per anesthesia guidelines. Tylenol is ok to take as needed. You will receive a call one business day prior to surgery with an arrival time and hospital directions. If your surgery is scheduled on a Monday, the hospital will be calling you on the Friday prior to your surgery. If you have not heard from anyone by 8pm, please call the hospital supervisor through the hospital  at [phone]. [facility] [phone]). Do not eat or drink anything after midnight the night before your surgery, including candy, mints, lifesavers, or chewing gum. Do not drink alcohol 24hrs before your surgery. Try not to smoke at least 24hrs before your surgery. Follow the pre surgery showering instructions as listed in the [facility] Surgical Experience Booklet” or otherwise provided by your surgeon's office. Do not shave the surgical area 24 hours before surgery. Do not apply any lotions, creams, including makeup, cologne, deodorant, or perfumes after showering on the day of your surgery. No contact lenses, eye make-up, or artificial eyelashes. Remove nail polish, including gel polish, and any artificial, gel, or acrylic nails if possible. Remove all jewelry including rings and body piercing jewelry. Wear causal clothing that is easy to take on and off. Consider your type of surgery. Keep any valuables, jewelry, piercings at home. Please bring any specially ordered equipment (sling, braces) if indicated. Arrange for a responsible person to drive you to and from the hospital on the day of your surgery. Visitor Guidelines discussed. Call the surgeon's office with any new illnesses, exposures, or additional questions prior to surgery. Please reference your [facility] Surgical Experience Booklet” for additional information to prepare for your upcoming surgery.

## 2023-08-11 ENCOUNTER — TELEPHONE (OUTPATIENT)
Dept: UROLOGY | Facility: CLINIC | Age: 53
End: 2023-08-11

## 2023-08-11 NOTE — TELEPHONE ENCOUNTER
----- Message from Couple, [address] [place] [address] sent at 8/10/2023  9:44 AM EDT -----  Please let patient know that her preop urine culture shows low colony count of mixed contaminants likely negative for infection and secondary to ureteral stent. I would like for her to have repeat urine culture today as she is scheduled for surgery on 8/15.

## 2023-08-11 NOTE — TELEPHONE ENCOUNTER
Patient made aware of urine culture results and plan to have repeat urine culture completed prior to upcoming procedure. Patient currently at work and labs will be closed when she gets done work. Also has commitments tomorrow morning and unable to get to the lab tomorrow. Patient asymptomatic and urine culture with low colony count of mixed contaminants. Will forward to provider as FYI to ensure nothing further is needed prior to procedure.

## 2023-08-14 ENCOUNTER — ANESTHESIA EVENT (OUTPATIENT)
Dept: PERIOP | Facility: HOSPITAL | Age: 53
End: 2023-08-14
Payer: COMMERCIAL

## 2023-08-14 NOTE — TELEPHONE ENCOUNTER
Contacted patient and made her aware plan to proceed as scheduled tomorrow- will receive standard pre-op abx.

## 2023-08-15 ENCOUNTER — ANESTHESIA (OUTPATIENT)
Dept: PERIOP | Facility: HOSPITAL | Age: 53
End: 2023-08-15
Payer: COMMERCIAL

## 2023-08-15 ENCOUNTER — APPOINTMENT (OUTPATIENT)
Dept: RADIOLOGY | Facility: HOSPITAL | Age: 53
End: 2023-08-15
Payer: COMMERCIAL

## 2023-08-15 ENCOUNTER — HOSPITAL ENCOUNTER (OUTPATIENT)
Facility: HOSPITAL | Age: 53
Setting detail: OUTPATIENT SURGERY
Discharge: HOME | End: 2023-08-15
Attending: UROLOGY | Admitting: UROLOGY
Payer: COMMERCIAL

## 2023-08-15 VITALS
TEMPERATURE: 97.7 F | RESPIRATION RATE: 14 BRPM | HEART RATE: 67 BPM | OXYGEN SATURATION: 95 % | DIASTOLIC BLOOD PRESSURE: 74 MMHG | SYSTOLIC BLOOD PRESSURE: 120 MMHG

## 2023-08-15 DIAGNOSIS — N20.0 RENAL CALCULUS, LEFT: Primary | ICD-10-CM

## 2023-08-15 PROCEDURE — 52356 CYSTO/URETERO W/LITHOTRIPSY: CPT | Performed by: UROLOGY

## 2023-08-15 PROCEDURE — C1747 URETEROSCOPE DIGITAL FLEX SNGL USE RVS DEFLECTION APTRA: HCPCS | Performed by: UROLOGY

## 2023-08-15 PROCEDURE — C1894 INTRO/SHEATH, NON-LASER: HCPCS | Performed by: UROLOGY

## 2023-08-15 PROCEDURE — C1769 GUIDE WIRE: HCPCS | Performed by: UROLOGY

## 2023-08-15 PROCEDURE — C2617 STENT, NON-COR, TEM W/O DEL: HCPCS | Performed by: UROLOGY

## 2023-08-15 PROCEDURE — 74420 UROGRAPHY RTRGR +-KUB: CPT

## 2023-08-15 DEVICE — VARIABLE LENGTH INJECTION STENT SET
Type: IMPLANTABLE DEVICE | Site: URETER | Status: FUNCTIONAL
Brand: CONTOUR VL™ INJECTION STENT SET

## 2023-08-15 RX ORDER — SODIUM CHLORIDE, SODIUM LACTATE, POTASSIUM CHLORIDE, CALCIUM CHLORIDE 600; 310; 30; 20 MG/100ML; MG/100ML; MG/100ML; MG/100ML
50 INJECTION, SOLUTION INTRAVENOUS CONTINUOUS
Status: DISCONTINUED | OUTPATIENT
Start: 2023-08-15 | End: 2023-08-15 | Stop reason: HOSPADM

## 2023-08-15 RX ORDER — MIDAZOLAM HYDROCHLORIDE 2 MG/2ML
INJECTION, SOLUTION INTRAMUSCULAR; INTRAVENOUS AS NEEDED
Status: DISCONTINUED | OUTPATIENT
Start: 2023-08-15 | End: 2023-08-15

## 2023-08-15 RX ORDER — ALBUTEROL SULFATE 2.5 MG/3ML
2.5 SOLUTION RESPIRATORY (INHALATION) ONCE AS NEEDED
Status: DISCONTINUED | OUTPATIENT
Start: 2023-08-15 | End: 2023-08-15 | Stop reason: HOSPADM

## 2023-08-15 RX ORDER — ONDANSETRON 2 MG/ML
INJECTION INTRAMUSCULAR; INTRAVENOUS AS NEEDED
Status: DISCONTINUED | OUTPATIENT
Start: 2023-08-15 | End: 2023-08-15

## 2023-08-15 RX ORDER — ONDANSETRON 2 MG/ML
4 INJECTION INTRAMUSCULAR; INTRAVENOUS ONCE AS NEEDED
Status: DISCONTINUED | OUTPATIENT
Start: 2023-08-15 | End: 2023-08-15 | Stop reason: HOSPADM

## 2023-08-15 RX ORDER — PROPOFOL 10 MG/ML
INJECTION, EMULSION INTRAVENOUS AS NEEDED
Status: DISCONTINUED | OUTPATIENT
Start: 2023-08-15 | End: 2023-08-15

## 2023-08-15 RX ORDER — FENTANYL CITRATE/PF 50 MCG/ML
25 SYRINGE (ML) INJECTION
Status: DISCONTINUED | OUTPATIENT
Start: 2023-08-15 | End: 2023-08-15 | Stop reason: HOSPADM

## 2023-08-15 RX ORDER — KETOROLAC TROMETHAMINE 30 MG/ML
30 INJECTION, SOLUTION INTRAMUSCULAR; INTRAVENOUS ONCE
Status: DISCONTINUED | OUTPATIENT
Start: 2023-08-15 | End: 2023-08-15 | Stop reason: HOSPADM

## 2023-08-15 RX ORDER — FENTANYL CITRATE 50 UG/ML
INJECTION, SOLUTION INTRAMUSCULAR; INTRAVENOUS AS NEEDED
Status: DISCONTINUED | OUTPATIENT
Start: 2023-08-15 | End: 2023-08-15

## 2023-08-15 RX ORDER — SULFAMETHOXAZOLE AND TRIMETHOPRIM 800; 160 MG/1; MG/1
1 TABLET ORAL DAILY
Qty: 7 TABLET | Refills: 0 | Status: SHIPPED | OUTPATIENT
Start: 2023-08-15 | End: 2023-08-22

## 2023-08-15 RX ORDER — DEXAMETHASONE SODIUM PHOSPHATE 10 MG/ML
INJECTION, SOLUTION INTRAMUSCULAR; INTRAVENOUS AS NEEDED
Status: DISCONTINUED | OUTPATIENT
Start: 2023-08-15 | End: 2023-08-15

## 2023-08-15 RX ORDER — LIDOCAINE HYDROCHLORIDE 10 MG/ML
INJECTION, SOLUTION EPIDURAL; INFILTRATION; INTRACAUDAL; PERINEURAL AS NEEDED
Status: DISCONTINUED | OUTPATIENT
Start: 2023-08-15 | End: 2023-08-15

## 2023-08-15 RX ORDER — SODIUM CHLORIDE, SODIUM LACTATE, POTASSIUM CHLORIDE, CALCIUM CHLORIDE 600; 310; 30; 20 MG/100ML; MG/100ML; MG/100ML; MG/100ML
INJECTION, SOLUTION INTRAVENOUS CONTINUOUS PRN
Status: DISCONTINUED | OUTPATIENT
Start: 2023-08-15 | End: 2023-08-15

## 2023-08-15 RX ORDER — CEFAZOLIN SODIUM 2 G/50ML
2000 SOLUTION INTRAVENOUS ONCE
Status: COMPLETED | OUTPATIENT
Start: 2023-08-15 | End: 2023-08-15

## 2023-08-15 RX ORDER — PROMETHAZINE HYDROCHLORIDE 25 MG/ML
12.5 INJECTION, SOLUTION INTRAMUSCULAR; INTRAVENOUS ONCE AS NEEDED
Status: DISCONTINUED | OUTPATIENT
Start: 2023-08-15 | End: 2023-08-15 | Stop reason: HOSPADM

## 2023-08-15 RX ORDER — MEPERIDINE HYDROCHLORIDE 25 MG/ML
12.5 INJECTION INTRAMUSCULAR; INTRAVENOUS; SUBCUTANEOUS
Status: DISCONTINUED | OUTPATIENT
Start: 2023-08-15 | End: 2023-08-15 | Stop reason: HOSPADM

## 2023-08-15 RX ORDER — HEPARIN SODIUM 5000 [USP'U]/ML
5000 INJECTION, SOLUTION INTRAVENOUS; SUBCUTANEOUS ONCE
Status: COMPLETED | OUTPATIENT
Start: 2023-08-15 | End: 2023-08-15

## 2023-08-15 RX ORDER — EPHEDRINE SULFATE 50 MG/ML
INJECTION INTRAVENOUS AS NEEDED
Status: DISCONTINUED | OUTPATIENT
Start: 2023-08-15 | End: 2023-08-15

## 2023-08-15 RX ORDER — HYDROMORPHONE HCL/PF 1 MG/ML
0.5 SYRINGE (ML) INJECTION
Status: DISCONTINUED | OUTPATIENT
Start: 2023-08-15 | End: 2023-08-15 | Stop reason: HOSPADM

## 2023-08-15 RX ORDER — HYDROCODONE BITARTRATE AND ACETAMINOPHEN 5; 325 MG/1; MG/1
1 TABLET ORAL EVERY 6 HOURS PRN
Status: DISCONTINUED | OUTPATIENT
Start: 2023-08-15 | End: 2023-08-15 | Stop reason: HOSPADM

## 2023-08-15 RX ORDER — SODIUM CHLORIDE 9 MG/ML
INJECTION, SOLUTION INTRAVENOUS AS NEEDED
Status: DISCONTINUED | OUTPATIENT
Start: 2023-08-15 | End: 2023-08-15 | Stop reason: HOSPADM

## 2023-08-15 RX ORDER — OXYCODONE HYDROCHLORIDE AND ACETAMINOPHEN 5; 325 MG/1; MG/1
1 TABLET ORAL EVERY 8 HOURS PRN
Qty: 12 TABLET | Refills: 0 | Status: SHIPPED | OUTPATIENT
Start: 2023-08-15

## 2023-08-15 RX ADMIN — FENTANYL CITRATE 50 MCG: 50 INJECTION, SOLUTION INTRAMUSCULAR; INTRAVENOUS at 08:36

## 2023-08-15 RX ADMIN — LIDOCAINE HYDROCHLORIDE 50 MG: 10 INJECTION, SOLUTION EPIDURAL; INFILTRATION; INTRACAUDAL; PERINEURAL at 07:52

## 2023-08-15 RX ADMIN — PROPOFOL 200 MG: 10 INJECTION, EMULSION INTRAVENOUS at 07:52

## 2023-08-15 RX ADMIN — HEPARIN SODIUM 5000 UNITS: 5000 INJECTION INTRAVENOUS; SUBCUTANEOUS at 06:31

## 2023-08-15 RX ADMIN — ONDANSETRON 4 MG: 2 INJECTION INTRAMUSCULAR; INTRAVENOUS at 08:31

## 2023-08-15 RX ADMIN — DEXAMETHASONE SODIUM PHOSPHATE 10 MG: 10 INJECTION, SOLUTION INTRAMUSCULAR; INTRAVENOUS at 08:31

## 2023-08-15 RX ADMIN — HYDROCODONE BITARTRATE AND ACETAMINOPHEN 1 TABLET: 5; 325 TABLET ORAL at 10:39

## 2023-08-15 RX ADMIN — SODIUM CHLORIDE, SODIUM LACTATE, POTASSIUM CHLORIDE, AND CALCIUM CHLORIDE: .6; .31; .03; .02 INJECTION, SOLUTION INTRAVENOUS at 07:29

## 2023-08-15 RX ADMIN — EPHEDRINE SULFATE 10 MG: 50 INJECTION, SOLUTION INTRAVENOUS at 08:18

## 2023-08-15 RX ADMIN — GENTAMICIN SULFATE 176 MG: 40 INJECTION, SOLUTION INTRAMUSCULAR; INTRAVENOUS at 08:03

## 2023-08-15 RX ADMIN — FENTANYL CITRATE 50 MCG: 50 INJECTION, SOLUTION INTRAMUSCULAR; INTRAVENOUS at 07:52

## 2023-08-15 RX ADMIN — CEFAZOLIN SODIUM 2000 MG: 2 SOLUTION INTRAVENOUS at 07:56

## 2023-08-15 RX ADMIN — MIDAZOLAM HYDROCHLORIDE 2 MG: 1 INJECTION, SOLUTION INTRAMUSCULAR; INTRAVENOUS at 07:48

## 2023-08-15 NOTE — OR NURSING
Patient stated in Pre-op holding area that she removed her previous stent at home. Patient  did so under the guidance of Nurse Practioner from the office over the phone.

## 2023-08-15 NOTE — NURSING NOTE
Pt is awake,alert,tolerated diet,written and verbal instructions given to pt, who verbalizes an understanding. Voided 800 cc of clear yellow urine, same strained, no calculi obtained.

## 2023-08-15 NOTE — INTERVAL H&P NOTE
H&P reviewed. After examining the patient I find no changes in the patients condition since the H&P had been written.     Vitals:    08/15/23 0611   BP: 138/85   Pulse: 76   Resp: 18   Temp: 98.2 °F (36.8 °C)   SpO2: 96%

## 2023-08-15 NOTE — OP NOTE
OPERATIVE REPORT  PATIENT NAME: Ismael Keyes    :  1970  MRN: [id number]  Pt Location:  OR ROOM 10    SURGERY DATE: 8/15/2023    Surgeon(s) and Role:     * Rigo Young MD - Primary    Preop Diagnosis:  Flank pain [R10.9]-left  Nephrolithiasis [N20.0]-left       Postoperative diagnosis:    Left flank pain-probably musculoskeletal  Left renal stones-nonobstructive  No evidence of retained stent or portion thereof    Procedure(s):  Left - CYSTOSCOPY URETEROSCOPY WITH LITHOTRIPSY HOLMIUM LASER. RETROGRADE PYELOGRAM AND INSERTION STENT URETERAL    Specimen(s):  * No specimens in log *    Estimated Blood Loss:   Minimal    Drains:  Ureteral Internal Stent Left ureter 6 Fr. (Active)   Number of days: 106       Ureteral Internal Stent Left ureter 6 Fr. (Active)   Number of days: 0       Anesthesia Type:   General/LMA    Operative Indications:  Patient underwent previous ureteroscopic laser lithotripsy by Dr. Ranae Barthel and removed her own ureteral stent-left-sided. The patient returned to the office complaining of left CVA discomfort and CT revealed residual calcifications in the lower pole of the left kidney and nonobstructive positions versus calcification of the renal cyst wall or even a portion of a retained stent because of a circular configuration of the stones. The patient presented for cystoscopy left ureteroscopy possible holmium laser lithotripsy and stent placement.     Operative Findings:  See operative note below    Complications:   None    Procedure and Technique:  I spoke to the patient in the holding area and described the procedure as proposed performed history and physical discussed potential risks and complications demonstrated CT imaging and expressed the feeling that more than likely the calcifications in the left kidney are not in a renal cyst are not related to any portion of the retained stent but more than likely you are fragments of previously fragmented stones or multiple small stones. There is no evidence of obstruction no evidence of hydronephrosis. The patient expressed understanding and provided verbal and signed informed consent after all questions were answered. The patient was brought to the operating room identified by the surgeon and placed in the dorsolithotomy position after induction of general LMA anesthesia and appropriate timeout. The patient was prepped and draped in usual sterile fashion and a 22 Senegalese cystoscope with 30 and 70 degree lenses was used to perform cystourethroscopy which revealed a normal bladder and urethra without stricture or intrinsic lesion extrinsic mass compression or other abnormalities. Both ureteral orifices are in normal position and configuration bilaterally with clear reflux bilaterally. A 0.035 Eyal-coated floppy tip guidewire was inserted through the cystoscope up the left ureter under fluoroscopic control. A second wire was positioned in the same fashion as well. 1 wire was used as a working wire and the other as a safety wire. The cystoscope was removed from the patient. Over the working wire a 10/12 Senegalese 35 cm ureteral access sheath was placed easily up the left ureter. The obturator and working wire were removed. A disposable flexible ureteroscope was then placed up the ureteral access sheath and meticulous examination of all pelvicalyceal system components took place. In the lower to mid polar region there was a group of very small approximately 1 mm or less stones that were laying circumferentially in the periphery of a calyx around the renal papilla. There is no evidence of any other stones within the left upper urinary tract and no evidence of retained stent or portion thereof. Using the holmium to 65 µm laser fiber the stones were fragmented even smaller.   After adequate fragmentation of the stones the ureteroscope was removed under direct vision along with the access sheath which showed no ureteral pathology whatsoever. After completion of the ureteroscopic laser lithotripsy and after removal of the access sheath the safety wire was used to introduce a fresh 6 [place] Contour double-J stent into the left upper urinary tract with 3 curls in the renal pelvis. Contrast injection revealed no filling defects good positioning and no hydronephrosis. There is no extravasation. The stent was then internalized and all other equipment other than the stent were removed from the patient. A transurethral Dangler suture was left tucked back into the patient's vagina for access to the stent next week when it is removed. There were no complications. The patient tolerated the procedure well without difficulty   I was present for the entire procedure. and I was present for all critical portions of the procedure.     Patient Disposition:  PACU         SIGNATURE: Tha Jo MD  DATE: August 15, 2023  TIME: 8:34 AM

## 2023-08-15 NOTE — ANESTHESIA POSTPROCEDURE EVALUATION
Post-Op Assessment Note    CV Status:  Stable    Pain management: adequate     Mental Status:  Alert and awake   Hydration Status:  Euvolemic   PONV Controlled:  Controlled   Airway Patency:  Patent      Post Op Vitals Reviewed: Yes      Staff: CRNA         No notable events documented.     /72 (08/15/23 0841)    Temp (!) 97.2 °F (36.2 °C) (08/15/23 0841)    Pulse 73 (08/15/23 0841)   Resp 16 (08/15/23 0841)    SpO2 92 % (08/15/23 0841)

## 2023-08-15 NOTE — ANESTHESIA PREPROCEDURE EVALUATION
Procedure:  CYSTOSCOPY URETEROSCOPY WITH LITHOTRIPSY HOLMIUM LASER, RETROGRADE PYELOGRAM AND INSERTION STENT URETERAL (Left: Ureter)    Relevant Problems   CARDIO   (+) Chronic migraine without aura without status migrainosus, not intractable   (+) Hyperlipidemia   (+) Hypertension      MUSCULOSKELETAL   (+) Chondromalacia patellae      NEURO/PSYCH   (+) Anxiety   (+) Chronic migraine without aura without status migrainosus, not intractable      PULMONARY   (+) Cough variant asthma   (+) Obstructive sleep apnea   (+) Shortness of breath        Physical Exam    Airway    Mallampati score: I  TM Distance: >3 FB  Neck ROM: full     Dental       Cardiovascular  Cardiovascular exam normal    Pulmonary  Pulmonary exam normal     Other Findings        Anesthesia Plan  ASA Score- 2     Anesthesia Type- general with ASA Monitors. Additional Monitors:   Airway Plan: LMA. Plan Factors-Exercise tolerance (METS): >4 METS. Chart reviewed. EKG reviewed. Imaging results reviewed. Existing labs reviewed. Patient summary reviewed. Patient is not a current smoker. Patient did not smoke on day of surgery. Obstructive sleep apnea risk education given perioperatively. Induction- intravenous. Postoperative Plan- Plan for postoperative opioid use. Planned trial extubation    Informed Consent- Anesthetic plan and risks discussed with patient. I personally reviewed this patient with the CRNA. Discussed and agreed on the Anesthesia Plan with the CRNA. Radha Tuttle

## 2023-08-16 ENCOUNTER — TELEPHONE (OUTPATIENT)
Dept: UROLOGY | Facility: MEDICAL CENTER | Age: 53
End: 2023-08-16

## 2023-08-16 DIAGNOSIS — N20.1 URETERAL CALCULUS, LEFT: Primary | ICD-10-CM

## 2023-08-16 NOTE — TELEPHONE ENCOUNTER
Post Op Note    Tino Sherman is a 47 y.o. female s/p CYSTOSCOPY URETEROSCOPY WITH LITHOTRIPSY HOLMIUM LASER, RETROGRADE PYELOGRAM AND INSERTION STENT URETERAL (Left: Ureter) performed 8/15/23 with Dr. Christianne Mary. How would you rate your pain on a scale from 1 to 10, 10 being the worst pain ever? Pt is experiencing discomfort from stent    Have you had a fever? No    Have your bowel movements been regular? No  Do you have any difficulty urinating? No  If the patient has an incision- do you have any redness around the incision or any drainage from the incision? No incision  If the patient has a drain- are you having any issues with the drain? No drain   If the patient has a gerard- are you comfortable caring for your gerard? No gerard    Do you have any other questions or concerns that I can address at this time? Pt is taking oxycodone for the stent discomfort. She was advised to hydrate well and start stool softeners and Miralax if needed. She will be removing her own stent on 8/22/23 at home as she has done so in the past.  She was told she will be called that day to make sure the stent was removed and discuss her post operative instructions. Pt knows to call the office with and issues or concerns.

## 2023-08-22 ENCOUNTER — TELEPHONE (OUTPATIENT)
Dept: UROLOGY | Facility: MEDICAL CENTER | Age: 53
End: 2023-08-22

## 2023-08-22 NOTE — TELEPHONE ENCOUNTER
Spoke to pt who confirmed she removed her stent this morning intact with no issues or concerns at this time. She states she has some mild discomfort in left flank. Advised she can take ibuprofen or tylenol and this should subside in a few days. If it does not she should contact the office. Pt will be seen for fu in [facility] office per request in 3 months with renal US; stone risk profile; and PTH, Uric Acid and CMP labs prior.

## 2023-09-14 NOTE — ED NOTES
Re-eval by md, plan of care update to pt by md  Attempt to retrieve  [place] in [place]  Pt updated  Pt initially wanted  to wait in [place] and not in room  Pt no distress  Pt for transfer to [facility] for uro eval ?litho vs stent        Leatha Chowdhury, RN  04/07/23 7822
Cell Phone/PDA (specify)/Clothing

## 2023-09-18 ENCOUNTER — TELEPHONE (OUTPATIENT)
Dept: INTERNAL MEDICINE CLINIC | Facility: CLINIC | Age: 53
End: 2023-09-18

## 2023-09-18 DIAGNOSIS — J32.9 CHRONIC SINUSITIS, UNSPECIFIED LOCATION: Primary | ICD-10-CM

## 2023-09-18 RX ORDER — PREDNISONE 10 MG/1
TABLET ORAL
Qty: 30 TABLET | Refills: 0 | Status: SHIPPED | OUTPATIENT
Start: 2023-09-18 | End: 2023-11-15

## 2023-09-18 RX ORDER — AMOXICILLIN AND CLAVULANATE POTASSIUM 875; 125 MG/1; MG/1
1 TABLET, FILM COATED ORAL 2 TIMES DAILY
Qty: 14 TABLET | Refills: 0 | Status: SHIPPED | OUTPATIENT
Start: 2023-09-18 | End: 2023-09-25

## 2023-09-18 NOTE — TELEPHONE ENCOUNTER
Patient called stating she is having sinus pressure and pain. Ears hurt,sore throat , post nasal drip . cough .  Can you please call something in [facility] on [address]

## 2023-10-21 DIAGNOSIS — G43.709 CHRONIC MIGRAINE WITHOUT AURA WITHOUT STATUS MIGRAINOSUS, NOT INTRACTABLE: ICD-10-CM

## 2023-10-21 RX ORDER — VERAPAMIL HYDROCHLORIDE 240 MG/1
TABLET, FILM COATED, EXTENDED RELEASE ORAL
Qty: 90 TABLET | Refills: 1 | Status: SHIPPED | OUTPATIENT
Start: 2023-10-21

## 2023-11-09 ENCOUNTER — RA CDI HCC (OUTPATIENT)
Dept: OTHER | Facility: HOSPITAL | Age: 53
End: 2023-11-09

## 2023-11-09 NOTE — PROGRESS NOTES
[address] [facility] coding opportunities          Chart Reviewed number of suggestions sent to Provider: 1   [id number]  Patients Insurance        Commercial Insurance: [employer]

## 2023-11-13 ENCOUNTER — HOSPITAL ENCOUNTER (OUTPATIENT)
Dept: ULTRASOUND IMAGING | Facility: HOSPITAL | Age: 53
Discharge: HOME | End: 2023-11-13
Payer: COMMERCIAL

## 2023-11-13 ENCOUNTER — APPOINTMENT (OUTPATIENT)
Dept: LAB | Facility: HOSPITAL | Age: 53
End: 2023-11-13
Payer: COMMERCIAL

## 2023-11-13 DIAGNOSIS — N20.1 URETERAL CALCULUS, LEFT: ICD-10-CM

## 2023-11-13 LAB
ALBUMIN SERPL BCP-MCNC: 4.3 G/DL (ref 3.5–5)
ALP SERPL-CCNC: 89 U/L (ref 34–104)
ALT SERPL W P-5'-P-CCNC: 11 U/L (ref 7–52)
ANION GAP SERPL CALCULATED.3IONS-SCNC: 9 MMOL/L
AST SERPL W P-5'-P-CCNC: 16 U/L (ref 13–39)
BILIRUB SERPL-MCNC: 0.51 MG/DL (ref 0.2–1)
BUN SERPL-MCNC: 8 MG/DL (ref 5–25)
CALCIUM SERPL-MCNC: 9.1 MG/DL (ref 8.4–10.2)
CHLORIDE SERPL-SCNC: 100 MMOL/L (ref 96–108)
CO2 SERPL-SCNC: 29 MMOL/L (ref 21–32)
CREAT SERPL-MCNC: 0.8 MG/DL (ref 0.6–1.3)
GFR SERPL CREATININE-BSD FRML MDRD: 85 ML/MIN/1.73SQ M
GLUCOSE P FAST SERPL-MCNC: 99 MG/DL (ref 65–99)
POTASSIUM SERPL-SCNC: 3.1 MMOL/L (ref 3.5–5.3)
PROT SERPL-MCNC: 7.8 G/DL (ref 6.4–8.4)
PTH-INTACT SERPL-MCNC: 94.4 PG/ML (ref 12–88)
SODIUM SERPL-SCNC: 138 MMOL/L (ref 135–147)
URATE SERPL-MCNC: 4.6 MG/DL (ref 2–7.5)

## 2023-11-13 PROCEDURE — 76770 US EXAM ABDO BACK WALL COMP: CPT

## 2023-11-13 PROCEDURE — 84550 ASSAY OF BLOOD/URIC ACID: CPT

## 2023-11-13 PROCEDURE — 36415 COLL VENOUS BLD VENIPUNCTURE: CPT

## 2023-11-13 PROCEDURE — 80053 COMPREHEN METABOLIC PANEL: CPT

## 2023-11-13 PROCEDURE — 83970 ASSAY OF PARATHORMONE: CPT

## 2023-11-15 ENCOUNTER — OFFICE VISIT (OUTPATIENT)
Dept: INTERNAL MEDICINE CLINIC | Facility: CLINIC | Age: 53
End: 2023-11-15
Payer: COMMERCIAL

## 2023-11-15 VITALS
BODY MASS INDEX: 32.06 KG/M2 | DIASTOLIC BLOOD PRESSURE: 76 MMHG | OXYGEN SATURATION: 95 % | WEIGHT: 199.5 LBS | HEART RATE: 75 BPM | SYSTOLIC BLOOD PRESSURE: 124 MMHG | HEIGHT: 66 IN | TEMPERATURE: 98 F

## 2023-11-15 DIAGNOSIS — F41.9 ANXIETY: ICD-10-CM

## 2023-11-15 DIAGNOSIS — E78.2 MIXED HYPERLIPIDEMIA: ICD-10-CM

## 2023-11-15 DIAGNOSIS — E55.9 VITAMIN D DEFICIENCY: ICD-10-CM

## 2023-11-15 DIAGNOSIS — R53.83 FATIGUE, UNSPECIFIED TYPE: ICD-10-CM

## 2023-11-15 DIAGNOSIS — Z23 ENCOUNTER FOR IMMUNIZATION: ICD-10-CM

## 2023-11-15 DIAGNOSIS — I10 PRIMARY HYPERTENSION: Primary | ICD-10-CM

## 2023-11-15 DIAGNOSIS — G47.00 INSOMNIA, UNSPECIFIED TYPE: ICD-10-CM

## 2023-11-15 DIAGNOSIS — Z13.1 SCREENING FOR DIABETES MELLITUS: ICD-10-CM

## 2023-11-15 DIAGNOSIS — E87.6 HYPOKALEMIA: ICD-10-CM

## 2023-11-15 PROBLEM — E87.5 HYPERKALEMIA: Status: ACTIVE | Noted: 2023-11-15

## 2023-11-15 PROBLEM — L03.211 CELLULITIS, FACE: Status: RESOLVED | Noted: 2022-05-17 | Resolved: 2023-11-15

## 2023-11-15 PROCEDURE — 83036 HEMOGLOBIN GLYCOSYLATED A1C: CPT | Performed by: FAMILY MEDICINE

## 2023-11-15 PROCEDURE — 90471 IMMUNIZATION ADMIN: CPT | Performed by: FAMILY MEDICINE

## 2023-11-15 PROCEDURE — 99396 PREV VISIT EST AGE 40-64: CPT | Performed by: FAMILY MEDICINE

## 2023-11-15 PROCEDURE — 90686 IIV4 VACC NO PRSV 0.5 ML IM: CPT | Performed by: FAMILY MEDICINE

## 2023-11-15 RX ORDER — POTASSIUM CHLORIDE 20 MEQ/1
20 TABLET, EXTENDED RELEASE ORAL DAILY
Qty: 90 TABLET | Refills: 3 | Status: SHIPPED | OUTPATIENT
Start: 2023-11-15

## 2023-11-15 NOTE — PROGRESS NOTES
Assessment/Plan:       {There are no diagnoses linked to this encounter. (Refresh or delete this SmartLink)}    ***DAXPLAN                Subjective:      Patient ID: Harrison Camilo is a 47 y.o. female. ***DAXHPI    {Common ambulatory SmartLinks:90991}    Review of Systems  ***DAXROS      Objective: There were no vitals taken for this visit. Physical Exam  ***DAXEXAM    ***DAXRESULTS        Transcribed for Charis Steiner MD, by *** on 11/15/23 at 7:12 AM. Powered by depict Jon.

## 2023-11-15 NOTE — PROGRESS NOTES
[address] [facility]    NAME: Shivam Nguyen  AGE: 47 y.o. SEX: female  : 1970     DATE: 2023     Assessment and Plan:     1. Primary hypertension  Assessment & Plan:  Blood pressure currently well controlled. Continue current medications. Orders:  -     CBC and differential; Future  -     Comprehensive metabolic panel; Future  -     Lipid panel; Future    2. Encounter for immunization  -     influenza vaccine, quadrivalent, 0.5 mL, preservative-free, for adult and pediatric patients 6 mos+ (AFLURIA, FLUARIX, FLULAVAL, FLUZONE)    3. Hypokalemia  Assessment & Plan: Will put the patient on potassium medication to take them every day or every other day. Orders:  -     potassium chloride (K-DUR,KLOR-CON) 20 mEq tablet; Take 1 tablet (20 mEq total) by mouth daily    4. Insomnia, unspecified type  Assessment & Plan:  Discussed options to help sleep. She wishes to hold off on any additional medications at present. 5. Anxiety  Assessment & Plan:  Anxiety symptoms and recent stressors discussed. Get adequate sleep. Continue with the Cymbalta. Methods for stress relief discussed. Orders:  -     CBC and differential; Future    6. Fatigue, unspecified type    7. Vitamin D deficiency  -     Vitamin D 25 hydroxy; Future    8. Mixed hyperlipidemia  Assessment & Plan:  Continue with the atorvastatin. Watch diet. Try to remain as active as possible. Increase fiber in diet. Orders:  -     Lipid panel; Future  -     TSH, 3rd generation; Future    9. Screening for diabetes mellitus  -     POCT hemoglobin A1c       Wellness  Provided the patient a slip for blood work to be done in a month, in 6 weeks, or at her most convenient time or so. Immunizations and preventive care screenings were discussed with patient today. Appropriate education was printed on patient's after visit summary.     Counseling:  Encouraged the patient to be cautious in getting RSV and to get enough rest.      Counseling:  Alcohol/drug use: discussed moderation in alcohol intake, the recommendations for healthy alcohol use, and avoidance of illicit drug use. Dental Health: discussed importance of regular tooth brushing, flossing, and dental visits. Injury prevention: discussed safety/seat belts, safety helmets, smoke detectors, carbon dioxide detectors, and smoking near bedding or upholstery. Sexual health: discussed sexually transmitted diseases, partner selection, use of condoms, avoidance of unintended pregnancy, and contraceptive alternatives. Exercise: the importance of regular exercise/physical activity was discussed. Recommend exercise 3-5 times per week for at least 30 minutes. Depression Screening and Follow-up Plan: Patient was screened for depression during today's encounter. They screened negative with a PHQ-2 score of 0. No follow-ups on file. Chief Complaint:     Chief Complaint   Patient presents with   • Annual Exam      History of Present Illness:     Adult Annual Physical     Dar Hope is a 43-year-old female presents for a comprehensive physical exam.    The patient reports she has been experiencing continous fatigue since 04/2023 and that recently her  has had an uncontrollable bowel movements which she needs to assist though it is now gradually resolving, she is also helping her mother along wth her sisters, and at the same time she occasionally goes to work until 10. She confirms she still has about 5 to 6 days of vacation left but she has been saving it for more important days. She confirms that she can sleep at night and ocassionaly sleeps in the recliner but denies getting enough rest and still experiencing fatigue. She reports she just had her kidney ultrasound the other day, 11/13/2023 but denies she have the results available on hand during the visit with me.  She had a history of kidney related surgery for kidney stones and that she is still experiecing continuos back pain since then. The patient states that her mother has pancreatic and liver cancer. Her sister have two more things removed under her parathyroid. Her maternal uncles had pancreatic cancer and the other one had stomach and bladder cancer. Her grandmother and grandfather had cancer as well. The patient reports experiencing skin sensitivy due to stress and she has been taking Cymbalta with benefits as she can now somehow control her emotions. The patient is not taking anything to make her potassium level consistently low and that she  is convinced that it may be due to her poor appetite. She reports she has severe nasal congestion due to environmental allergies though she has been taking allergy medication and will ocassionaly experience eye irritation as well. She is inclined to getting her flu vaccine today, 11/15/2023. She declined to get a refill today, 11/15/2023. She is up to date with her mammogram.    The patient loves to travel with her family. Diet and Physical Activity  - Diet/Nutrition: She reports she has poor appetite. General Health  - Sleep: Denies she is well-rested. Diet and Physical Activity  Diet/Nutrition: frequent junk food and consuming 3-5 servings of fruits/vegetables daily. Exercise: no formal exercise. General Health  Sleep: sleeps poorly and gets 4-6 hours of sleep on average. Hearing: normal - bilateral.  Vision: goes for regular eye exams. Dental: regular dental visits.        Depression Screening  PHQ-2/9 Depression Screening    Little interest or pleasure in doing things: 0 - not at all  Feeling down, depressed, or hopeless: 0 - not at all  PHQ-2 Score: 0  PHQ-2 Interpretation: Negative depression screen          Past Medical History:     Past Medical History:   Diagnosis Date   • Anxiety    • Asthma     seasonal allergy induced   • Bruxism (teeth grinding)    • Claustrophobia    • Endometriosis    • Esophageal reflux    • High cholesterol    • Hypertension    • Kidney stone    • Migraines    • Plantar fasciitis    • Seasonal allergies    • Sleep apnea     can't use CPAP   • Swelling of both lower extremities    • Wears contact lenses    • Wears glasses       Past Surgical History:     Past Surgical History:   Procedure Laterality Date   • BONE GRAFT      L jaw for future false tooth implant   •  SECTION     • FL RETROGRADE PYELOGRAM  2023   • FL RETROGRADE PYELOGRAM  2023   • FOOT SURGERY Left    • LAPAROSCOPY      of uterus   • SD CYSTO/URETERO W/LITHOTRIPSY &INDWELL STENT INSRT Left 2023    Procedure: CYSTOSCOPY  RETROGRADE PYELOGRAM AND INSERTION STENT URETERAL;  Surgeon: Karlee Kraft MD;  Location: [facility];  Service: Urology   • SD CYSTO/URETERO W/LITHOTRIPSY &INDWELL STENT INSRT Left 2023    Procedure: Malcolm Jonas, &STENT;  Surgeon: Hortensia Alarcon MD;  Location: [facility];  Service: Urology   • SD CYSTO/URETERO W/LITHOTRIPSY &INDWELL STENT INSRT Left 8/15/2023    Procedure: CYSTOSCOPY URETEROSCOPY WITH LITHOTRIPSY HOLMIUM LASER, RETROGRADE PYELOGRAM AND INSERTION STENT URETERAL;  Surgeon: Crystal Schmidt MD;  Location: [address] [place] [address] MAIN OR;  Service: Urology   • TOOTH EXTRACTION      3 besides wisdom teeth   • WISDOM TOOTH EXTRACTION      2 of teeth      Social History:     Social History     Socioeconomic History   • Marital status: /Civil Union     Spouse name: Andreas   • Number of children: 2   • Years of education: None   • Highest education level: None   Occupational History   • Occupation:    Tobacco Use   • Smoking status: Never   • Smokeless tobacco: Never   Vaping Use   • Vaping Use: Never used   Substance and Sexual Activity   • Alcohol use: Yes     Comment: rarely   • Drug use: Yes     Types: Marijuana     Comment: thc gummies   • Sexual activity: Yes     Partners: Male   Other Topics Concern • None   Social History Narrative    Caffeine use        House built in [address] source of heat is coal stove, also has wood burning stove for extreme cold temps and as backup has electric baseboard heat. [facility]    Hardwood floor with area rug in bedroom    Dehumidifier in basement. Basement is dry, finished, some musty smell after rain and sometimes when [facility] turns off before starting up coal stove. No humidifier or air purifier.     Lives with  and 2 daughters        Pets - 3 dogs - Jeet, Sera, Brodie            Caffeine - Coffee 1 large every morning    Tea - iced - 5 to 6 days a week - 2 cups a day    Soda - rarely    Chocolate - small amount occasionally     Social Determinants of Health     Financial Resource Strain: Not on file   Food Insecurity: Not on file   Transportation Needs: Not on file   Physical Activity: Inactive (8/25/2020)    Exercise Vital Sign    • Days of Exercise per Week: 0 days    • Minutes of Exercise per Session: 0 min   Stress: Not on file   Social Connections: Not on file   Intimate Partner Violence: Not on file   Housing Stability: Not on file      Family History:     Family History   Adopted: Yes   Problem Relation Age of Onset   • Heart murmur Mother    • Depression Mother    • Other Mother         C-spine injury   • Pancreatic cancer Mother 70 april 56 diagnosed   • Hypertension Father    • Abdominal aortic aneurysm Father    • Raynaud syndrome Sister    • Allergies Sister    • Hypothyroidism Sister    • Raynaud syndrome Sister    • Bipolar disorder Sister    • Allergies Sister    • Allergies Daughter         Environmental   • Allergies Daughter         Environmental   • Colon cancer Maternal Grandmother    • No Known Problems Maternal Grandfather    • No Known Problems Paternal Grandmother    • No Known Problems Paternal Grandfather    • Asperger's syndrome Brother    • Allergies Brother    • Cancer Maternal Uncle    • Asperger's syndrome Family disorder   • Diabetes Family         DM   • Bipolar disorder Family    • Depression Family    • Hypertension Family    • Raynaud syndrome Family         phenomenon      Current Medications:     Current Outpatient Medications   Medication Sig Dispense Refill   • acetaminophen (TYLENOL) 325 mg tablet Take 2 tablets (650 mg total) by mouth every 6 (six) hours as needed for mild pain, fever or headaches 30 tablet 0   • albuterol (PROVENTIL HFA,VENTOLIN HFA) 90 mcg/act inhaler Inhale 2 puffs every 6 (six) hours as needed for wheezing     • atorvastatin (LIPITOR) 20 mg tablet take 1 tablet by mouth once daily 90 tablet 3   • Cholecalciferol (VITAMIN D3 PO) Take by mouth     • DULoxetine (CYMBALTA) 30 mg delayed release capsule Take 1 capsule (30 mg total) by mouth daily 30 capsule 5   • fluticasone (FLONASE) 50 mcg/act nasal spray 2 sprays into each nostril daily 16 g 11   • ibuprofen (MOTRIN) 200 mg tablet Take by mouth every 6 (six) hours as needed for mild pain     • NON FORMULARY Take 25 mg by mouth daily at bedtime THC gummies for sleep     • omeprazole (PriLOSEC) 20 mg delayed release capsule Take 20 mg by mouth daily as needed      • polyethylene glycol (MIRALAX) 17 g packet Take 17 g by mouth if needed     • potassium chloride (K-DUR,KLOR-CON) 20 mEq tablet Take 1 tablet (20 mEq total) by mouth daily 90 tablet 3   • Rimegepant Sulfate (Nurtec) 75 MG TBDP Take 75 mg by mouth every other day (Patient taking differently: Take 75 mg by mouth if needed) 16 tablet 11   • verapamil (CALAN-SR) 240 mg CR tablet take 1 tablet by mouth once daily at bedtime 90 tablet 1   • zolpidem (AMBIEN) 5 mg tablet take 1 tablet by mouth at bedtime if needed for sleep 30 tablet 1     No current facility-administered medications for this visit. Allergies:      Allergies   Allergen Reactions   • Gabapentin Swelling     In lymph node        Review of Systems:     Review of Systems  Constitutional: Negative for activity change, appetite change, chills and fever. HENT: Positive for congestion and rhinorrhea. Eyes: Negative for visual disturbance. Respiratory: Negative for chest tightness and shortness of breath. Cardiovascular: Negative for chest pain and palpitations. Gastrointestinal: Negative for abdominal pain, blood in stool, diarrhea, nausea and vomiting. Endocrine: Negative for polydipsia, polyphagia and polyuria. Genitourinary: Negative for dysuria, frequency and urgency. Musculoskeletal: Negative for gait problem. Skin: Negative for color change. Neurological: Negative for dizziness and headaches. Hematological: Does not bruise/bleed easily. Psychiatric/Behavioral: Negative for confusion and sleep disturbance. The patient is not nervous/anxious. Physical Exam:   /76 (BP Location: Left arm, Patient Position: Sitting, Cuff Size: Standard)   Pulse 75   Temp 98 °F (36.7 °C)   Ht 5' 6" (1.676 m)   Wt 90.5 kg (199 lb 8 oz)   SpO2 95%   BMI 32.20 kg/m²     Physical Exam  Vitals and nursing note reviewed. Constitutional:       General: She is not in acute distress. Appearance: She is well-developed, well-groomed and overweight. Comments: Very tired appearing   HENT:      Head: Normocephalic and atraumatic. Eyes:      Conjunctiva/sclera: Conjunctivae normal.   Cardiovascular:      Rate and Rhythm: Normal rate and regular rhythm. Heart sounds: No murmur heard. Pulmonary:      Effort: Pulmonary effort is normal. No respiratory distress. Breath sounds: Normal breath sounds. Abdominal:      Palpations: Abdomen is soft. Tenderness: There is no abdominal tenderness. Musculoskeletal:         General: No swelling. Cervical back: Neck supple. Skin:     General: Skin is warm and dry. Capillary Refill: Capillary refill takes less than 2 seconds. Neurological:      Mental Status: She is alert.    Psychiatric:         Mood and Affect: Mood and affect normal. Speech: Speech normal.         Behavior: Behavior is cooperative. Comments: Very stressed and fatigued        Below is the patient's most recent value for Albumin, ALT, AST, BUN, Calcium, Chloride, Cholesterol, CO2, Creatinine, GFR, Glucose, HDL, Hematocrit, Hemoglobin, Hemoglobin A1C, LDL, Magnesium, Phosphorus, Platelets, Potassium, PSA, Sodium, Triglycerides, and WBC. Lab Results   Component Value Date    ALT 11 11/13/2023    AST 16 11/13/2023    BUN 8 11/13/2023    CALCIUM 9.1 11/13/2023     11/13/2023    CHOL 199 12/09/2017    CO2 29 11/13/2023    CREATININE 0.80 11/13/2023    HDL 36 (L) 07/22/2023    HCT 42.5 07/22/2023    HGB 14.7 07/22/2023    HGBA1C 5 11/16/2023     07/22/2023    K 3.1 (L) 11/13/2023     12/09/2017    TRIG 245 (H) 07/22/2023    WBC 6.03 07/22/2023     Note: for a comprehensive list of the patient's lab results, access the Results Review activity. I have personally reviewed results with the patient. Luis Schultz MD  [facility] PRIMARY CARE [address]    Transcribed for Luis Schultz MD, by Wilfrido Andres on 11/16/23 at 7:16 AM. Powered by [employer].

## 2023-11-16 LAB — SL AMB POCT HEMOGLOBIN AIC: 5 (ref ?–6.5)

## 2023-11-16 NOTE — ASSESSMENT & PLAN NOTE
Continue with the atorvastatin. Watch diet. Try to remain as active as possible. Increase fiber in diet.

## 2023-11-16 NOTE — ASSESSMENT & PLAN NOTE
Anxiety symptoms and recent stressors discussed. Get adequate sleep. Continue with the Cymbalta. Methods for stress relief discussed.

## 2023-11-22 ENCOUNTER — OFFICE VISIT (OUTPATIENT)
Dept: UROLOGY | Facility: CLINIC | Age: 53
End: 2023-11-22
Payer: COMMERCIAL

## 2023-11-22 VITALS
OXYGEN SATURATION: 96 % | DIASTOLIC BLOOD PRESSURE: 86 MMHG | BODY MASS INDEX: 31.18 KG/M2 | WEIGHT: 194 LBS | HEART RATE: 72 BPM | HEIGHT: 66 IN | SYSTOLIC BLOOD PRESSURE: 124 MMHG

## 2023-11-22 DIAGNOSIS — N20.1 URETERAL CALCULUS, LEFT: Primary | ICD-10-CM

## 2023-11-22 DIAGNOSIS — R10.9 FLANK PAIN: ICD-10-CM

## 2023-11-22 PROCEDURE — 99213 OFFICE O/P EST LOW 20 MIN: CPT

## 2023-11-22 PROCEDURE — 82360 CALCULUS ASSAY QUANT: CPT

## 2023-11-22 NOTE — PROGRESS NOTES
11/22/2023    Chief Complaint   Patient presents with    Follow-up     F/O to kidney stones. US completed. Pt states doing well overall but still has some mild pain on left side. Assessment and Plan    47 y.o. female     1. Nephrolithiasis  Status post left ureteroscopy with laser lithotripsy on 8/15/2023. This was performed after there was concern for possible retained ureteral stent versus new developing staghorn calculi following her prior lithotripsy on 5/1/2023. Intraoperative findings showed no evidence of retained ureteral stent. She did have small clots or stone in the lower pole which were fragmented. CT findings secondary to calcified cyst.  Send stone for stone analysis  Follow-up 1 year with ultrasound and KUB. 2.  Left flank pain  Persistent dull intermittent pain following ureteroscopy. Ultrasound imaging shows no evidence of obstructing stones. There is a 2 mm shadowing left renal calculi noted. I suspect this is possibly the calcified cyst.  Pain is exacerbated with movement and activity however also spicy or acidic foods also exacerbate this. I suppose she could possibly be experiencing reflux however imaging has been negative for hydronephrosis and she has no issues with recurrent UTIs. Recommend conservative treatment with making sure to empty the bladder and to avoid postpone urination, drinking plenty of water, and using heat and ice as needed. If symptoms persist we could consider a VCUG. She will call with persistent or worsening symptoms. Subjective:        Doing well postoperatively, she was able to pass a small stone fragment we will send for stone analysis. She does complain of persistent intermittent left flank pain. This is exacerbated with movement and activity however also with certain spicy or acidic foods. We discussed that this may be simply musculoskeletal given that activity does exacerbate her pain.   However it is interesting that it is also exacerbated by spicy or acidic foods. History of Present Illness  Rosaura Carvalho is a 47 y.o. female here for follow-up evaluation status post ureteroscopy with laser lithotripsy on 8/15/2023. She was originally seen in consultation during hospitalization in April when she had presented to [address] [place] [address] on 4/07/2023 due to left-sided flank pain x4 days with associated nausea, vomiting, increased urinary frequency. Medical expulsive therapy was attempted in the emergency room however patient's pain was uncontrolled requiring transfer to [address] [place] [address] for further evaluation. Patient would undergo cystoscopy, left ureteral stent placement by Dr. Roro Bagley on 4/7/2023. She would later undergo definitive stone intervention with cystoscopy, left ureteroscopy with laser lithotripsy, and left ureteral stent placement by Dr. Mariluz Reddy on 5/1/2023. Ureteral stent with string was removed on 5/5/2023. Postop KUB from 6/27/2023 shows no radiopaque urinary tract calculi bilaterally. Upon further review of imaging patient had a CT renal stone study completed on 5/21/2023 per radiology impression reporting developing staghorn calculus in the left kidney. Questionable/borderline left-sided hydronephrosis raising the possibility of a recently passed stone. I personally reviewed these images as well as prior CT imaging from 4/07/2023 and I have concerned that this is not a developing staghorn calculi, rather I suspect she may have a partially retained ureteral stent. Patient had removed her ureteral stent at home by herself on 5/4 and is unable to recall if this was removed in its entirety. She has never removed a ureteral stent before in the past.  She does continue to complain of persistent left flank pain but denies any fevers or chills.   I am recommending that we repeat a CT renal stone study for reevaluation of the questionable staghorn calculi versus retained ureteral stent. Repeat CT completed on 7/10/2023 redemonstrated a curvilinear calcification seen best on coronal reformatted images in the left kidney may represent a peripherally calcified cyst measuring 10.5 mm or developing staghorn calculus. Unchanged from previous study. She would undergo cystoscopy, left ureteroscopy on 8/15/2023. Intraoperative findings showed in the lower to mid pole region of the left kidney there was a group of very small approximately 1 mm or less stones that were layering circumferentially in the peripheral of the calyx around the renal papilla. There is no evidence of other stones within the left upper urinary tract and no evidence of retained stent or portion thereof. The small stones were fragmented and a left ureteral stent was left in place and removed by patient on 8/22. Review of Systems   Constitutional:  Negative for chills and fever. HENT:  Negative for ear pain and sore throat. Eyes:  Negative for pain and visual disturbance. Respiratory:  Negative for cough and shortness of breath. Cardiovascular:  Negative for chest pain and palpitations. Gastrointestinal:  Negative for abdominal pain and vomiting. Genitourinary:  Positive for flank pain (Left). Negative for dysuria and hematuria. Musculoskeletal:  Negative for arthralgias and back pain. Skin:  Negative for color change and rash. Neurological:  Negative for seizures and syncope. All other systems reviewed and are negative. Vitals  Vitals:    11/22/23 0820   BP: 124/86   Pulse: 72   SpO2: 96%   Weight: 88 kg (194 lb)   Height: 5' 6" (1.676 m)       Physical Exam  Vitals reviewed. Constitutional:       General: She is not in acute distress. Appearance: Normal appearance. She is normal weight. She is not ill-appearing or toxic-appearing. HENT:      Head: Normocephalic and atraumatic. Nose: Nose normal.   Eyes:      General: No scleral icterus.      Conjunctiva/sclera: Conjunctivae normal.   Cardiovascular:      Rate and Rhythm: Normal rate. Pulses: Normal pulses. Pulmonary:      Effort: Pulmonary effort is normal. No respiratory distress. Abdominal:      General: Abdomen is flat. Palpations: Abdomen is soft. Tenderness: There is no abdominal tenderness. There is no right CVA tenderness or left CVA tenderness. Hernia: No hernia is present. Musculoskeletal:         General: Normal range of motion. Cervical back: Normal range of motion. Skin:     General: Skin is warm and dry. Neurological:      General: No focal deficit present. Mental Status: She is alert and oriented to person, place, and time. Mental status is at baseline. Psychiatric:         Mood and Affect: Mood normal.         Behavior: Behavior normal.         Thought Content:  Thought content normal.         Judgment: Judgment normal.         Past History  Past Medical History:   Diagnosis Date    Anxiety     Asthma     seasonal allergy induced    Bruxism (teeth grinding)     Claustrophobia     Endometriosis     Esophageal reflux     High cholesterol     Hypertension     Kidney stone     Migraines     Plantar fasciitis     Seasonal allergies     Sleep apnea     can't use CPAP    Swelling of both lower extremities     Wears contact lenses     Wears glasses      Social History     Socioeconomic History    Marital status: /Civil Union     Spouse name: Andreas    Number of children: 2    Years of education: None    Highest education level: None   Occupational History    Occupation:    Tobacco Use    Smoking status: Never    Smokeless tobacco: Never   Vaping Use    Vaping Use: Never used   Substance and Sexual Activity    Alcohol use: Yes     Comment: rarely    Drug use: Yes     Types: Marijuana     Comment: thc gummies    Sexual activity: Yes     Partners: Male   Other Topics Concern    None   Social History Narrative    Caffeine use        House built in [facility] source of heat is coal stove, also has wood burning stove for extreme cold temps and as backup has electric baseboard heat. [facility] [place]    Hardwood floor with area rug in bedroom    Dehumidifier in basement. Basement is dry, finished, some musty smell after rain and sometimes when [facility] [place] turns off before starting up coal stove. No humidifier or air purifier.     Lives with  and 2 daughters        Pets - 3 dogs - Jeet, Sera, Brodie            Caffeine - Coffee 1 large every morning    Tea - iced - 5 to 6 days a week - 2 cups a day    Soda - rarely    Chocolate - small amount occasionally     Social Determinants of Health     Financial Resource Strain: Not on file   Food Insecurity: Not on file   Transportation Needs: Not on file   Physical Activity: Inactive (8/25/2020)    Exercise Vital Sign     Days of Exercise per Week: 0 days     Minutes of Exercise per Session: 0 min   Stress: Not on file   Social Connections: Not on file   Intimate Partner Violence: Not on file   Housing Stability: Not on file     Social History     Tobacco Use   Smoking Status Never   Smokeless Tobacco Never     Family History   Adopted: Yes   Problem Relation Age of Onset    Heart murmur Mother     Depression Mother     Other Mother         C-spine injury    Pancreatic cancer Mother 70        april 56 diagnosed    Hypertension Father     Abdominal aortic aneurysm Father     Raynaud syndrome Sister     Allergies Sister     Hypothyroidism Sister     Raynaud syndrome Sister     Bipolar disorder Sister     Allergies Sister     Allergies Daughter         Environmental    Allergies Daughter         Environmental    Colon cancer Maternal Grandmother     No Known Problems Maternal Grandfather     No Known Problems Paternal Grandmother     No Known Problems Paternal Grandfather     Asperger's syndrome Brother     Allergies Brother     Cancer Maternal Uncle     Asperger's syndrome Family         disorder    Diabetes Family         DM Bipolar disorder Family     Depression Family     Hypertension Family     Raynaud syndrome Family         phenomenon       The following portions of the patient's history were reviewed and updated as appropriate allergies, current medications, past medical history, past social history, past surgical history and problem list    [id number]  RENAL ULTRASOUND WITH PVR     INDICATION:   N20.1: Calculus of ureter. COMPARISON: Comparison is made to the kidneys and urinary bladder and CT renal stone study dated 7/10/2023     TECHNIQUE:   Ultrasound of the retroperitoneum was performed with a curvilinear transducer utilizing volumetric sweeps and still imaging techniques. FINDINGS:     KIDNEYS:  Symmetric and normal size. Right kidney:  10.2 x 7.2 x 4.7 cm. Volume 183.0 mL  Left kidney:  10.3 x 6.5 x 4.3 cm. Volume 152.9 mL     Right kidney  Normal echogenicity and contour. No mass is identified. No hydronephrosis. No shadowing calculi. No perinephric fluid collections. Left kidney  Normal echogenicity and contour. No mass is identified. No hydronephrosis. 2 mm lower pole shadowing calculus. No perinephric fluid collections. URETERS:  Nonvisualized. BLADDER:  Normally distended. No focal thickening or mass lesions. Bilateral ureteral jets detected. Prevoid: 224.5  No significant post void volume. Measured post void volume in mL: 14.3        IMPRESSION:     1. 2 mm shadowing left renal calculus, otherwise unremarkable kidneys without hydronephrosis. 2.  Unremarkable urinary bladder. No significant post void residual.  3.  The etiology of the patient's clinical symptomatology is not definitively identified on ultrasound. Results  No results found for this or any previous visit (from the past 1 hour(s)). ]  No results found for: "PSA"  Lab Results   Component Value Date    CALCIUM 9.1 2023     2017    K 3.1 (L) 2023    CO2 29 2023     11/13/2023    BUN 8 11/13/2023    CREATININE 0.80 11/13/2023     Lab Results   Component Value Date    WBC 6.03 07/22/2023    HGB 14.7 07/22/2023    HCT 42.5 07/22/2023    MCV 92 07/22/2023     07/22/2023       Please Note:  Voice dictation software has been used to create this document. There may be inadvertent transcriptions errors.      GWEN Sahu 11/22/23

## 2023-12-05 LAB
COLOR STONE: NORMAL
COMMENT-STONE3: NORMAL
COMPOSITION: NORMAL
LABORATORY COMMENT REPORT: NORMAL
PHOTO: NORMAL
SIZE STONE: NORMAL MM
SPEC SOURCE SUBJ: NORMAL
STONE ANALYSIS-IMP: NORMAL
STONE ANALYSIS-IMP: NORMAL
WT STONE: 2 MG

## 2024-01-19 ENCOUNTER — TELEPHONE (OUTPATIENT)
Dept: INTERNAL MEDICINE CLINIC | Facility: CLINIC | Age: 54
End: 2024-01-19

## 2024-01-19 DIAGNOSIS — F41.9 ANXIETY: Primary | ICD-10-CM

## 2024-01-19 RX ORDER — ALPRAZOLAM 0.5 MG/1
0.5 TABLET ORAL
Qty: 30 TABLET | Refills: 0 | Status: SHIPPED | OUTPATIENT
Start: 2024-01-19

## 2024-01-19 NOTE — TELEPHONE ENCOUNTER
Tommy left message stating mother passed away and is having a difficult time.  She was asking for something to help her cope. Cyn called something to [facility].  Pt aware.

## 2024-01-24 DIAGNOSIS — F41.8 DEPRESSION WITH ANXIETY: ICD-10-CM

## 2024-01-25 RX ORDER — DULOXETIN HYDROCHLORIDE 30 MG/1
30 CAPSULE, DELAYED RELEASE ORAL DAILY
Qty: 30 CAPSULE | Refills: 5 | Status: SHIPPED | OUTPATIENT
Start: 2024-01-25

## 2024-02-21 PROBLEM — H10.13 ALLERGIC CONJUNCTIVITIS OF BOTH EYES: Status: RESOLVED | Noted: 2019-09-24 | Resolved: 2024-02-21

## 2024-04-12 DIAGNOSIS — G43.709 CHRONIC MIGRAINE WITHOUT AURA WITHOUT STATUS MIGRAINOSUS, NOT INTRACTABLE: ICD-10-CM

## 2024-04-12 DIAGNOSIS — E78.2 MIXED HYPERLIPIDEMIA: ICD-10-CM

## 2024-04-12 RX ORDER — ATORVASTATIN CALCIUM 20 MG/1
TABLET, FILM COATED ORAL
Qty: 90 TABLET | Refills: 3 | Status: SHIPPED | OUTPATIENT
Start: 2024-04-12

## 2024-04-12 RX ORDER — VERAPAMIL HYDROCHLORIDE 240 MG/1
TABLET, FILM COATED, EXTENDED RELEASE ORAL
Qty: 90 TABLET | Refills: 1 | Status: SHIPPED | OUTPATIENT
Start: 2024-04-12

## 2024-04-29 ENCOUNTER — OFFICE VISIT (OUTPATIENT)
Dept: OBGYN CLINIC | Facility: CLINIC | Age: 54
End: 2024-04-29
Payer: COMMERCIAL

## 2024-04-29 ENCOUNTER — APPOINTMENT (OUTPATIENT)
Dept: RADIOLOGY | Facility: CLINIC | Age: 54
End: 2024-04-29
Payer: COMMERCIAL

## 2024-04-29 VITALS
BODY MASS INDEX: 31.82 KG/M2 | SYSTOLIC BLOOD PRESSURE: 134 MMHG | DIASTOLIC BLOOD PRESSURE: 96 MMHG | HEART RATE: 81 BPM | HEIGHT: 66 IN | WEIGHT: 198 LBS

## 2024-04-29 DIAGNOSIS — M25.522 PAIN IN LEFT ELBOW: ICD-10-CM

## 2024-04-29 DIAGNOSIS — M25.521 PAIN IN RIGHT ELBOW: ICD-10-CM

## 2024-04-29 DIAGNOSIS — M25.521 PAIN IN RIGHT ELBOW: Primary | ICD-10-CM

## 2024-04-29 PROCEDURE — 3075F SYST BP GE 130 - 139MM HG: CPT | Performed by: STUDENT IN AN ORGANIZED HEALTH CARE EDUCATION/TRAINING PROGRAM

## 2024-04-29 PROCEDURE — 73070 X-RAY EXAM OF ELBOW: CPT

## 2024-04-29 PROCEDURE — 3080F DIAST BP >= 90 MM HG: CPT | Performed by: STUDENT IN AN ORGANIZED HEALTH CARE EDUCATION/TRAINING PROGRAM

## 2024-04-29 PROCEDURE — 99204 OFFICE O/P NEW MOD 45 MIN: CPT | Performed by: STUDENT IN AN ORGANIZED HEALTH CARE EDUCATION/TRAINING PROGRAM

## 2024-04-29 NOTE — PROGRESS NOTES
Ortho Sports Medicine New Patient Elbow Visit     Assesment:   53 y.o.female with right elbow pain without a specific injury and exam consistent with lateral epicondylitis.    Plan:  I reviewed the history, exam, and imaging with the patient in clinic today.  I did review the patient's x-rays show no significant osseous abnormalities.  On exam, she has tenderness over the lateral condyle which is exacerbated by wrist extension and finger extension.  I discussed that the patient has lateral epicondylitis.  I discussed potential treatment options focusing on conservative manage.  I recommended physical therapy.  I also offered the patient a wrist brace that she could wear at night to help with pain.  Patient was amenable to this plan.  Recommend that she follow-up in 6 weeks for repeat evaluation.  Patient demonstrated understanding discussion was agreed with plan.  All questions answered.  She will call with any question concerns anytime.    Conservative treatment:  Ice to elbow 1-2 times daily, for 20 minutes at a time.  PT for ROM and strengthening to elbow, wrist and forearm  Wrist brace provided today to wear at night.    Imaging:  All imaging from today was reviewed by myself and explained to the patient.     Injection:  No Injection planned at this time.    Surgery:  No surgery is recommended at this point, continue with conservative treatment plan as noted.    Follow up:  Return in about 6 weeks (around 6/10/2024).        Chief Complaint   Patient presents with    Right Elbow - Pain       History of Present Illness:  The patient is a 53 y.o. RHD female seen in clinic for right elbow pain. The pain started several years ago.  There was no specific injury or inciting event.  Patient previously was evaluated for cubital tunnel including EMG which was negative.  She was treated nonoperatively with physical therapy and her symptoms improved.  Patient states that she currently has pain over the lateral aspect of the  elbow.  She states that she has pain at night.  She states that the pain radiates to to her fingers.  She has been taking ibuprofen as needed.  She denies any numbness or tingling to fingers.  She states that in the past therapy including stretching has helped with her symptoms.    Occupation involves typing.    Hand/wrist Surgical History:  None    Past Medical, Social and Family History:  Past Medical History:   Diagnosis Date    Anxiety     Asthma     seasonal allergy induced    Bruxism (teeth grinding)     Claustrophobia     Endometriosis     Esophageal reflux     High cholesterol     Hypertension     Kidney stone     Migraines     Plantar fasciitis     Seasonal allergies     Sleep apnea     can't use CPAP    Swelling of both lower extremities     Wears contact lenses     Wears glasses      Past Surgical History:   Procedure Laterality Date    BONE GRAFT      L jaw for future false tooth implant     SECTION      FL RETROGRADE PYELOGRAM  2023    FL RETROGRADE PYELOGRAM  2023    FOOT SURGERY Left     LAPAROSCOPY      of uterus    NY CYSTO/URETERO W/LITHOTRIPSY &INDWELL STENT INSRT Left 2023    Procedure: CYSTOSCOPY  RETROGRADE PYELOGRAM AND INSERTION STENT URETERAL;  Surgeon: Nick Gallagher MD;  Location:  MAIN OR;  Service: Urology    NY CYSTO/URETERO W/LITHOTRIPSY &INDWELL STENT INSRT Left 2023    Procedure: CYSTO USCOPE W/  LASER, &STENT;  Surgeon: Lee Holliday MD;  Location: [facility];  Service: Urology    NY CYSTO/URETERO W/LITHOTRIPSY &INDWELL STENT INSRT Left 8/15/2023    Procedure: CYSTOSCOPY URETEROSCOPY WITH LITHOTRIPSY HOLMIUM LASER, RETROGRADE PYELOGRAM AND INSERTION STENT URETERAL;  Surgeon: Ruben Donahue MD;  Location:  MAIN OR;  Service: Urology    TOOTH EXTRACTION      3 besides wisdom teeth    WISDOM TOOTH EXTRACTION      2 of teeth     Allergies   Allergen Reactions    Gabapentin Swelling     In lymph node     Current Outpatient Medications on  File Prior to Visit   Medication Sig Dispense Refill    acetaminophen (TYLENOL) 325 mg tablet Take 2 tablets (650 mg total) by mouth every 6 (six) hours as needed for mild pain, fever or headaches 30 tablet 0    albuterol (PROVENTIL HFA,VENTOLIN HFA) 90 mcg/act inhaler Inhale 2 puffs every 6 (six) hours as needed for wheezing      ALPRAZolam (XANAX) 0.5 mg tablet Take 1 tablet (0.5 mg total) by mouth daily at bedtime as needed for anxiety 30 tablet 0    atorvastatin (LIPITOR) 20 mg tablet take 1 tablet by mouth once daily 90 tablet 3    Cholecalciferol (VITAMIN D3 PO) Take by mouth      DULoxetine (CYMBALTA) 30 mg delayed release capsule take 1 capsule by mouth once daily 30 capsule 5    ibuprofen (MOTRIN) 200 mg tablet Take by mouth every 6 (six) hours as needed for mild pain      NON FORMULARY Take 25 mg by mouth daily at bedtime THC gummies for sleep      omeprazole (PriLOSEC) 20 mg delayed release capsule Take 20 mg by mouth daily as needed       potassium chloride (K-DUR,KLOR-CON) 20 mEq tablet Take 1 tablet (20 mEq total) by mouth daily 90 tablet 3    Rimegepant Sulfate (Nurtec) 75 MG TBDP Take 75 mg by mouth every other day (Patient taking differently: Take 75 mg by mouth if needed) 16 tablet 11    verapamil (CALAN-SR) 240 mg CR tablet take 1 tablet by mouth once daily at bedtime 90 tablet 1    zolpidem (AMBIEN) 5 mg tablet take 1 tablet by mouth at bedtime if needed for sleep 30 tablet 1    fluticasone (FLONASE) 50 mcg/act nasal spray 2 sprays into each nostril daily 16 g 11    polyethylene glycol (MIRALAX) 17 g packet Take 17 g by mouth if needed (Patient not taking: Reported on 11/22/2023)       No current facility-administered medications on file prior to visit.     Social History     Socioeconomic History    Marital status: /Civil Union     Spouse name: Andreas    Number of children: 2    Years of education: Not on file    Highest education level: Not on file   Occupational History    Occupation: Case  worker   Tobacco Use    Smoking status: Never    Smokeless tobacco: Never   Vaping Use    Vaping status: Never Used   Substance and Sexual Activity    Alcohol use: Yes     Comment: rarely    Drug use: Yes     Types: Marijuana     Comment: thc gummies    Sexual activity: Yes     Partners: Male   Other Topics Concern    Not on file   Social History Narrative    Caffeine use        House built in 1980    Main source of heat is coal stove, also has wood burning stove for extreme cold temps and as backup has electric baseboard heat.    Central AC    Hardwood floor with area rug in bedroom    Dehumidifier in basement.    Basement is dry, finished, some musty smell after rain and sometimes when AC turns off before starting up coal stove.    No humidifier or air purifier.    Lives with  and 2 daughters        Pets - 3 dogs - Sera Marcano, Brodie            Caffeine - Coffee 1 large every morning    Tea - iced - 5 to 6 days a week - 2 cups a day    Soda - rarely    Chocolate - small amount occasionally     Social Determinants of Health     Financial Resource Strain: Not on file   Food Insecurity: Not on file   Transportation Needs: Not on file   Physical Activity: Inactive (8/25/2020)    Exercise Vital Sign     Days of Exercise per Week: 0 days     Minutes of Exercise per Session: 0 min   Stress: Not on file   Social Connections: Not on file   Intimate Partner Violence: Not on file   Housing Stability: Not on file         I have reviewed the past medical, surgical, social and family history, medications and allergies as documented in the EMR.    Review of systems: ROS is negative other than that noted in the HPI.  Constitutional: Negative for fatigue and fever.   HENT: Negative for sore throat.    Respiratory: Negative for shortness of breath.    Cardiovascular: Negative for chest pain.   Gastrointestinal: Negative for abdominal pain.   Endocrine: Negative for cold intolerance and heat intolerance.   Genitourinary:  "Negative for flank pain.   Musculoskeletal: Negative for back pain.   Skin: Negative for rash.   Allergic/Immunologic: Negative for immunocompromised state.   Neurological: Negative for dizziness.   Psychiatric/Behavioral: Negative for agitation.     Physical Exam:    Blood pressure 134/96, pulse 81, height 5' 6\" (1.676 m), weight 89.8 kg (198 lb).    General/Constitutional: NAD, well developed, well nourished  HENT: Normocephalic, atraumatic  CV: Intact distal pulses, regular rate  Resp: No respiratory distress or labored breathing  Neuro: Alert and Oriented x 3  Psych: Normal mood, normal affect, normal judgement, normal behavior  Skin: Warm, dry, no rashes, no erythema      Focused right Elbow Exam:  Skin is intact.  No ecchymosis, erythema or effusion noted on exam.    Full range of motion of the elbow including full extension, full flexion, full pronation, and full supination without pain or mechanical block    Tenderness over the lateral epicondyle and extensor muscles.  No tenderness over the medial epicondyle, olecranon, radial head, or proximal wrist flexors.      No tenderness and negative Tinel's over the cubital tunnel.  No subluxation of the ulnar nerve with flexion or extension of the elbow.    5/5 strength with elbow flexion and extension, wrist flexion and extension, forearm pronation and supination.  Pain with resisted wrist extension and finger extension.  No pain with resisted finger flexion.  No pain with resisted pronation or supination of the forearm.     Negative hook test of the distal biceps tendon.    Elbow is stable to 0 and 30 degrees of varus and valgus stress at both 0 and 30 degrees of flexion.  Negative moving valgus stress test.    UE NV Exam: +2 Radial pulses bilaterally  Sensation intact to light touch C5-T1 bilaterally, Radial/median/ulnar nerve motor intact    Bilateral shoulder, wrist/hand, and forearm ROM full, painless with passive ROM, no ttp or crepitance throughout " extremities above wrist joint    Cervical ROM is full without pain, numbness or tingling    Negative spurling maneuver bilaterally       Elbow Imaging:    X-rays of the right elbow were obtained 4/29/2024 and reviewed with the patient.  Based on my independent review, imaging shows no acute osseous abnormalities include no fracture, dislocation, or significant degenerative disease..          Scribe Attestation      I,:   am acting as a scribe while in the presence of the attending physician.:       I,:   personally performed the services described in this documentation    as scribed in my presence.:

## 2024-05-16 ENCOUNTER — OFFICE VISIT (OUTPATIENT)
Dept: INTERNAL MEDICINE CLINIC | Facility: CLINIC | Age: 54
End: 2024-05-16
Payer: COMMERCIAL

## 2024-05-16 VITALS
TEMPERATURE: 98.4 F | HEIGHT: 66 IN | OXYGEN SATURATION: 96 % | WEIGHT: 203 LBS | BODY MASS INDEX: 32.62 KG/M2 | HEART RATE: 84 BPM | DIASTOLIC BLOOD PRESSURE: 82 MMHG | SYSTOLIC BLOOD PRESSURE: 124 MMHG

## 2024-05-16 DIAGNOSIS — J30.1 CHRONIC SEASONAL ALLERGIC RHINITIS DUE TO POLLEN: ICD-10-CM

## 2024-05-16 DIAGNOSIS — M25.50 ARTHRALGIA, UNSPECIFIED JOINT: ICD-10-CM

## 2024-05-16 DIAGNOSIS — E78.2 MIXED HYPERLIPIDEMIA: ICD-10-CM

## 2024-05-16 DIAGNOSIS — J30.89 ALLERGIC RHINITIS DUE TO HOUSE DUST MITE: ICD-10-CM

## 2024-05-16 DIAGNOSIS — R53.83 FATIGUE, UNSPECIFIED TYPE: ICD-10-CM

## 2024-05-16 DIAGNOSIS — F33.0 MILD EPISODE OF RECURRENT MAJOR DEPRESSIVE DISORDER (HCC): Primary | ICD-10-CM

## 2024-05-16 DIAGNOSIS — E55.9 VITAMIN D DEFICIENCY: ICD-10-CM

## 2024-05-16 DIAGNOSIS — I10 PRIMARY HYPERTENSION: ICD-10-CM

## 2024-05-16 DIAGNOSIS — Z12.31 VISIT FOR SCREENING MAMMOGRAM: ICD-10-CM

## 2024-05-16 PROCEDURE — 99214 OFFICE O/P EST MOD 30 MIN: CPT | Performed by: FAMILY MEDICINE

## 2024-05-16 RX ORDER — FLUTICASONE PROPIONATE 50 MCG
2 SPRAY, SUSPENSION (ML) NASAL DAILY
Qty: 16 G | Refills: 11 | Status: SHIPPED | OUTPATIENT
Start: 2024-05-16 | End: 2025-05-16

## 2024-05-16 RX ORDER — BUPROPION HYDROCHLORIDE 150 MG/1
150 TABLET ORAL EVERY MORNING
Qty: 90 TABLET | Refills: 3 | Status: SHIPPED | OUTPATIENT
Start: 2024-05-16 | End: 2025-05-11

## 2024-05-17 NOTE — PROGRESS NOTES
Ambulatory Visit  Name: Tommy Busch      : 1970      MRN: [id number]  Encounter Provider: Rylie Dyer MD  Encounter Date: 2024   Encounter department: [facility]    Assessment & Plan   1. Mild episode of recurrent major depressive disorder (HCC)  -     CBC and differential; Future  -     buPROPion (WELLBUTRIN XL) 150 mg 24 hr tablet; Take 1 tablet (150 mg total) by mouth every morning  2. Allergic rhinitis due to house dust mite  -     fluticasone (FLONASE) 50 mcg/act nasal spray; 2 sprays into each nostril daily  -     CBC and differential; Future  3. Chronic seasonal allergic rhinitis due to pollen  -     fluticasone (FLONASE) 50 mcg/act nasal spray; 2 sprays into each nostril daily  -     CBC and differential; Future  4. Mixed hyperlipidemia  -     CBC and differential; Future  -     Comprehensive metabolic panel; Future  -     Lipid panel; Future  -     TSH, 3rd generation; Future  5. Primary hypertension  -     CBC and differential; Future  6. Fatigue, unspecified type  -     CBC and differential; Future  7. Vitamin D deficiency  -     CBC and differential; Future  8. Arthralgia, unspecified joint  -     CBC and differential; Future  -     C-reactive protein; Future  -     YARIEL Screen w/ Reflex to Titer/Pattern; Future  -     RF Screen w/ Reflex to Titer; Future  9. Visit for screening mammogram  -     Mammo screening bilateral w 3d & cad; Future    Orders and recommendations as noted above.  Mood issues discussed.  Grieving process discussed.  Discussed the signs and symptoms of depression.  She has many of these.  Discussed either increasing the dose of the Cymbalta or transitioning to a different medication.  Will try to transition to the Wellbutrin.  Discussed potential causes with her.  Hopefully this will as well as controlling her depression symptoms better.  Discussed methods to relieve stress.  Work issues discussed.  Discussed updating her FMLA.   Continue with the atorvastatin as previously.  Lipid panel relatively stable.  Discussed some of the peripheral edema.  Discussed potentially using the Lasix on an as-needed basis.  Verapamil may be contributing to some of the peripheral edema.  Blood pressure remains relatively well-controlled.  Continue to follow-up with neurology regarding the headaches.  Will have her get additional laboratory testing with next blood work especially with her overall arthralgias and worsening fatigue.       History of Present Illness     She presents for routine follow-up.  Is still grieving the loss of her mother.  She still has difficulty getting out of bed and functioning on certain days.  Has missed a lot of work because of this.  Has difficulty concentrating at times.  Is unsure whether the Cymbalta is helping at all.  Has noticed some overall body aches associated with her fatigue.  Has noticed some peripheral edema intermittently over the past month or so.  She is tolerating the atorvastatin without difficulty.  Denies any muscle aches or weakness with this except as noted above.  Appetite remains variable.  Continues to follow-up with neurology for the headaches.  These have been relatively well-controlled.  Tolerating the verapamil except for the.  Denies any headaches at present or any localized weakness.  Vision has been stable.        Review of Systems   Constitutional:  Positive for activity change and fatigue. Negative for appetite change, chills and fever.   HENT:  Negative for congestion and rhinorrhea.    Eyes:  Negative for visual disturbance.   Respiratory:  Negative for chest tightness and shortness of breath.    Cardiovascular:  Positive for leg swelling. Negative for chest pain and palpitations.   Gastrointestinal:  Negative for abdominal pain, blood in stool, diarrhea, nausea and vomiting.   Endocrine: Negative for polydipsia, polyphagia and polyuria.   Genitourinary:  Negative for dysuria, frequency and  urgency.   Musculoskeletal:  Positive for arthralgias, back pain and myalgias. Negative for gait problem.   Skin:  Negative for color change.   Allergic/Immunologic: Positive for environmental allergies.   Neurological:  Positive for headaches. Negative for dizziness.   Hematological:  Does not bruise/bleed easily.   Psychiatric/Behavioral:  Positive for dysphoric mood and sleep disturbance. Negative for confusion. The patient is not nervous/anxious.      Medical History Reviewed by provider this encounter:       Past Medical History   Past Medical History:   Diagnosis Date    Allergic     Anxiety     Asthma     seasonal allergy induced    Bruxism (teeth grinding)     Claustrophobia     Endometriosis     Esophageal reflux     GERD (gastroesophageal reflux disease)     Headache(784.0)     High cholesterol     Hypertension     Kidney stone     Migraines     Plantar fasciitis     Seasonal allergies     Shingles 2017    Sleep apnea     can't use CPAP    Swelling of both lower extremities     Wears contact lenses     Wears glasses      Past Surgical History:   Procedure Laterality Date    BONE GRAFT      L jaw for future false tooth implant     SECTION      FL RETROGRADE PYELOGRAM  2023    FL RETROGRADE PYELOGRAM  2023    FOOT SURGERY Left     LAPAROSCOPY      of uterus    HI CYSTO/URETERO W/LITHOTRIPSY &INDWELL STENT INSRT Left 2023    Procedure: CYSTOSCOPY  RETROGRADE PYELOGRAM AND INSERTION STENT URETERAL;  Surgeon: Nick Gallagher MD;  Location: [facility];  Service: Urology    HI CYSTO/URETERO W/LITHOTRIPSY &INDWELL STENT INSRT Left 2023    Procedure: CYSTO USCOPE W/  LASER, &STENT;  Surgeon: Lee Holliday MD;  Location: [facility];  Service: Urology    HI CYSTO/URETERO W/LITHOTRIPSY &INDWELL STENT INSRT Left 8/15/2023    Procedure: CYSTOSCOPY URETEROSCOPY WITH LITHOTRIPSY HOLMIUM LASER, RETROGRADE PYELOGRAM AND INSERTION STENT URETERAL;  Surgeon: Ruben Donahue MD;   Location:  MAIN OR;  Service: Urology    TOOTH EXTRACTION      3 besides wisdom teeth    WISDOM TOOTH EXTRACTION      2 of teeth     Family History   Adopted: Yes   Problem Relation Age of Onset    Heart murmur Mother     Depression Mother     Other Mother         C-spine injury    Pancreatic cancer Mother 73        2023 diagnosed    Colon cancer Mother         Pancreatic cancer/    Hypertension Father             Abdominal aortic aneurysm Father     Raynaud syndrome Sister     Allergies Sister     Hypothyroidism Sister     Raynaud syndrome Sister     Bipolar disorder Sister     Allergies Sister     Allergies Daughter         Environmental    Allergies Daughter         Environmental    Colon cancer Maternal Grandmother         Colorectal cancer/    No Known Problems Maternal Grandfather     No Known Problems Paternal Grandmother     No Known Problems Paternal Grandfather     Asperger's syndrome Brother     Allergies Brother     Cancer Maternal Uncle     Asperger's syndrome Family         disorder    Diabetes Family         DM    Bipolar disorder Family     Depression Family     Hypertension Family     Raynaud syndrome Family         phenomenon     Current Outpatient Medications on File Prior to Visit   Medication Sig Dispense Refill    acetaminophen (TYLENOL) 325 mg tablet Take 2 tablets (650 mg total) by mouth every 6 (six) hours as needed for mild pain, fever or headaches 30 tablet 0    albuterol (PROVENTIL HFA,VENTOLIN HFA) 90 mcg/act inhaler Inhale 2 puffs every 6 (six) hours as needed for wheezing      ALPRAZolam (XANAX) 0.5 mg tablet Take 1 tablet (0.5 mg total) by mouth daily at bedtime as needed for anxiety 30 tablet 0    atorvastatin (LIPITOR) 20 mg tablet take 1 tablet by mouth once daily 90 tablet 3    Cholecalciferol (VITAMIN D3 PO) Take by mouth      ibuprofen (MOTRIN) 200 mg tablet Take by mouth every 6 (six) hours as needed for mild pain      NON FORMULARY Take 25 mg by  mouth daily at bedtime THC gummies for sleep      potassium chloride (K-DUR,KLOR-CON) 20 mEq tablet Take 1 tablet (20 mEq total) by mouth daily 90 tablet 3    Rimegepant Sulfate (Nurtec) 75 MG TBDP Take 75 mg by mouth every other day (Patient taking differently: Take 75 mg by mouth if needed) 16 tablet 11    verapamil (CALAN-SR) 240 mg CR tablet take 1 tablet by mouth once daily at bedtime 90 tablet 1    zolpidem (AMBIEN) 5 mg tablet take 1 tablet by mouth at bedtime if needed for sleep 30 tablet 1    [DISCONTINUED] DULoxetine (CYMBALTA) 30 mg delayed release capsule take 1 capsule by mouth once daily 30 capsule 5    [DISCONTINUED] fluticasone (FLONASE) 50 mcg/act nasal spray 2 sprays into each nostril daily 16 g 11    omeprazole (PriLOSEC) 20 mg delayed release capsule Take 20 mg by mouth daily as needed       [DISCONTINUED] polyethylene glycol (MIRALAX) 17 g packet Take 17 g by mouth if needed (Patient not taking: Reported on 11/22/2023)       No current facility-administered medications on file prior to visit.     Allergies   Allergen Reactions    Gabapentin Swelling     In lymph node      Current Outpatient Medications on File Prior to Visit   Medication Sig Dispense Refill    acetaminophen (TYLENOL) 325 mg tablet Take 2 tablets (650 mg total) by mouth every 6 (six) hours as needed for mild pain, fever or headaches 30 tablet 0    albuterol (PROVENTIL HFA,VENTOLIN HFA) 90 mcg/act inhaler Inhale 2 puffs every 6 (six) hours as needed for wheezing      ALPRAZolam (XANAX) 0.5 mg tablet Take 1 tablet (0.5 mg total) by mouth daily at bedtime as needed for anxiety 30 tablet 0    atorvastatin (LIPITOR) 20 mg tablet take 1 tablet by mouth once daily 90 tablet 3    Cholecalciferol (VITAMIN D3 PO) Take by mouth      ibuprofen (MOTRIN) 200 mg tablet Take by mouth every 6 (six) hours as needed for mild pain      NON FORMULARY Take 25 mg by mouth daily at bedtime THC gummies for sleep      potassium chloride (K-DUR,KLOR-CON)  "20 mEq tablet Take 1 tablet (20 mEq total) by mouth daily 90 tablet 3    Rimegepant Sulfate (Nurtec) 75 MG TBDP Take 75 mg by mouth every other day (Patient taking differently: Take 75 mg by mouth if needed) 16 tablet 11    verapamil (CALAN-SR) 240 mg CR tablet take 1 tablet by mouth once daily at bedtime 90 tablet 1    zolpidem (AMBIEN) 5 mg tablet take 1 tablet by mouth at bedtime if needed for sleep 30 tablet 1    [DISCONTINUED] DULoxetine (CYMBALTA) 30 mg delayed release capsule take 1 capsule by mouth once daily 30 capsule 5    [DISCONTINUED] fluticasone (FLONASE) 50 mcg/act nasal spray 2 sprays into each nostril daily 16 g 11    omeprazole (PriLOSEC) 20 mg delayed release capsule Take 20 mg by mouth daily as needed       [DISCONTINUED] polyethylene glycol (MIRALAX) 17 g packet Take 17 g by mouth if needed (Patient not taking: Reported on 11/22/2023)       No current facility-administered medications on file prior to visit.      Social History     Tobacco Use    Smoking status: Never    Smokeless tobacco: Never   Vaping Use    Vaping status: Never Used   Substance and Sexual Activity    Alcohol use: Not Currently     Comment: Consume 3-10  drinks/ year    Drug use: Yes     Types: Marijuana     Comment: thc gummies    Sexual activity: Yes     Partners: Male     Birth control/protection: None     Objective     /82 (BP Location: Left arm, Patient Position: Sitting, Cuff Size: Large)   Pulse 84   Temp 98.4 °F (36.9 °C)   Ht 5' 6\" (1.676 m)   Wt 92.1 kg (203 lb)   SpO2 96%   BMI 32.77 kg/m²     Physical Exam  Vitals and nursing note reviewed.   Constitutional:       General: She is not in acute distress.     Appearance: She is well-developed and well-groomed.   HENT:      Head: Normocephalic and atraumatic.      Nose: Congestion and rhinorrhea present.   Eyes:      Conjunctiva/sclera: Conjunctivae normal.   Cardiovascular:      Rate and Rhythm: Normal rate and regular rhythm.      Heart sounds: No murmur " heard.  Pulmonary:      Effort: Pulmonary effort is normal. No respiratory distress.      Breath sounds: Normal breath sounds.   Abdominal:      Palpations: Abdomen is soft.      Tenderness: There is no abdominal tenderness.   Musculoskeletal:         General: No swelling.      Cervical back: Neck supple.   Skin:     General: Skin is warm and dry.      Capillary Refill: Capillary refill takes less than 2 seconds.   Neurological:      Mental Status: She is alert.   Psychiatric:         Mood and Affect: Mood is depressed. Affect is tearful.         Speech: Speech normal.         Behavior: Behavior is cooperative.         Cognition and Memory: Cognition and memory normal.       Administrative Statements   I have spent a total time of 25 minutes on 05/16/24 In caring for this patient including Diagnostic results, Prognosis, Risks and benefits of tx options, Instructions for management, Patient and family education, Importance of tx compliance, Risk factor reductions, Impressions, Counseling / Coordination of care, Documenting in the medical record, and Reviewing / ordering tests, medicine, procedures  .

## 2024-06-10 ENCOUNTER — OFFICE VISIT (OUTPATIENT)
Dept: OBGYN CLINIC | Facility: CLINIC | Age: 54
End: 2024-06-10
Payer: COMMERCIAL

## 2024-06-10 VITALS
BODY MASS INDEX: 32.14 KG/M2 | HEIGHT: 66 IN | HEART RATE: 79 BPM | SYSTOLIC BLOOD PRESSURE: 135 MMHG | DIASTOLIC BLOOD PRESSURE: 90 MMHG | WEIGHT: 200 LBS

## 2024-06-10 DIAGNOSIS — M77.11 LATERAL EPICONDYLITIS OF RIGHT ELBOW: Primary | ICD-10-CM

## 2024-06-10 PROCEDURE — 99214 OFFICE O/P EST MOD 30 MIN: CPT | Performed by: STUDENT IN AN ORGANIZED HEALTH CARE EDUCATION/TRAINING PROGRAM

## 2024-06-10 NOTE — PROGRESS NOTES
Ortho Sports Medicine New Patient Elbow Visit     Assesment:   53 y.o.female with right elbow pain without a specific injury and exam consistent with lateral epicondylitis.    Plan:  Reviewed the history and exam with the patient in clinic today.  The patient has been doing home exercise program and using a brace to help with treatment of her lateral epicondylitis.  She does state that her pain has improved in the forearm but she does have tenderness over the lateral aspect of the elbow and pain with certain movements including finger extension.  On exam, the pain with wrist extension is minimal but she does have pain with resisted finger extension.  I discussed with the patient that complete resolution of symptoms for lateral epicondylitis can take several months with physical therapy.  I discussed that overall I am encouraged by the fact that after 6 weeks she has had significant improvement in her symptoms.  I discussed potential treatment options with her.  I discussed the need to continue with the current plan of home exercises versus formal physical therapy.  I did offer the patient an anti-inflammatory which she deferred at this time.  I also discussed the possibility of a corticosteroid injection.  I did discuss that the literature is mixed on the results of this.  I recommended continued home exercises versus physical therapy for another 6 weeks with follow-up at that point.  I discussed that if her symptoms continue to persist we could try an injection at this point.  Patient was amenable to this plan.  All her questions were answered.  I will see her back in 6 weeks for repeat evaluation.  She reach out to clinic with any question concerns anytime.      Conservative treatment:  Ice to elbow 1-2 times daily, for 20 minutes at a time.  Continue HEP for the right elbow. Would recommend 3 months of therapy prior to an injection. Patient declined an injection today.  Wrist brace to wear at night. May increase  "to wearing it daily.  May try an elbow pad over the lateral side of the elbow at night to limit motion and aid in comfort. May purchase this OTC.  Ibuprofen prn. May consider meloxicam in the future as well.    Imaging:  All imaging from prior to today was reviewed by myself and explained to the patient.     Injection:  No Injection planned at this time. May consider an injection in the future pending clinical course.    Surgery:  No surgery is recommended at this point, continue with conservative treatment plan as noted.    Follow up:  Return in about 6 weeks (around 7/22/2024).        Chief Complaint   Patient presents with    Right Elbow - Follow-up, Pain       History of Present Illness:  The patient is a 53 y.o. RHD female seen in clinic for a 6 week follow up of the right elbow for pain in the setting of lateral epicondyltiis. At the patient's last appointment she was referred to PT and provided a wrist brace. Since that time she states she has continued pain but less pain with lifting lighter objects. She states she continues to have pain at night despite the brace. She has been doing a HEP from the internet at she was unable to attend PT. She states the pain continues to be sharp and she also describes the pain as like a \"brush burn\". The pain originally started several years ago.  There was no specific injury or inciting event.  Patient previously was evaluated for cubital tunnel including EMG which was negative.  She was treated nonoperatively with physical therapy and her symptoms improved.  Pain remains over the lateral aspect of the elbow. Pain can radiate to to her fingers.  She has been taking ibuprofen as needed.  She denies any numbness or tingling to fingers.  She states that in the past therapy including stretching has helped with her symptoms as does ice. She is hesitatnt to take more than ibuprofen daily due to her mother having hx of GIB from NSAIDs.    Occupation involves typing.    Hand/wrist " Surgical History:  None    Past Medical, Social and Family History:  Past Medical History:   Diagnosis Date    Allergic     Anxiety     Asthma     seasonal allergy induced    Bruxism (teeth grinding)     Claustrophobia     Endometriosis     Esophageal reflux     GERD (gastroesophageal reflux disease)     Headache(784.0)     High cholesterol     Hypertension     Kidney stone     Migraines     Plantar fasciitis     Seasonal allergies     Shingles 2017    Sleep apnea     can't use CPAP    Swelling of both lower extremities     Wears contact lenses     Wears glasses      Past Surgical History:   Procedure Laterality Date    BONE GRAFT      L jaw for future false tooth implant     SECTION      FL RETROGRADE PYELOGRAM  2023    FL RETROGRADE PYELOGRAM  2023    FOOT SURGERY Left     LAPAROSCOPY      of uterus    [place] CYSTO/URETERO W/LITHOTRIPSY &INDWELL STENT INSRT Left 2023    Procedure: CYSTOSCOPY  RETROGRADE PYELOGRAM AND INSERTION STENT URETERAL;  Surgeon: Nick Gallagher MD;  [facility]: [facility];  Service: Urology    [place] CYSTO/URETERO W/LITHOTRIPSY &INDWELL STENT INSRT Left 2023    Procedure: CYSTO USCOPE W/  LASER, &STENT;  Surgeon: Lee Holliday MD;  Location: [facility];  Service: Urology    [place] CYSTO/URETERO W/LITHOTRIPSY &INDWELL STENT INSRT Left 8/15/2023    Procedure: CYSTOSCOPY URETEROSCOPY WITH LITHOTRIPSY HOLMIUM LASER, RETROGRADE PYELOGRAM AND INSERTION STENT URETERAL;  Surgeon: Ruben Donauhe MD;  Location:  MAIN OR;  Service: Urology    TOOTH EXTRACTION      3 besides wisdom teeth    WISDOM TOOTH EXTRACTION      2 of teeth     Allergies   Allergen Reactions    Gabapentin Swelling     In lymph node     Current Outpatient Medications on File Prior to Visit   Medication Sig Dispense Refill    acetaminophen (TYLENOL) 325 mg tablet Take 2 tablets (650 mg total) by mouth every 6 (six) hours as needed for mild pain, fever or headaches 30 tablet 0    albuterol (PROVENTIL  HFA,VENTOLIN HFA) 90 mcg/act inhaler Inhale 2 puffs every 6 (six) hours as needed for wheezing      ALPRAZolam (XANAX) 0.5 mg tablet Take 1 tablet (0.5 mg total) by mouth daily at bedtime as needed for anxiety 30 tablet 0    atorvastatin (LIPITOR) 20 mg tablet take 1 tablet by mouth once daily 90 tablet 3    buPROPion (WELLBUTRIN XL) 150 mg 24 hr tablet Take 1 tablet (150 mg total) by mouth every morning 90 tablet 3    Cholecalciferol (VITAMIN D3 PO) Take by mouth      fluticasone (FLONASE) 50 mcg/act nasal spray 2 sprays into each nostril daily 16 g 11    ibuprofen (MOTRIN) 200 mg tablet Take by mouth every 6 (six) hours as needed for mild pain      NON FORMULARY Take 25 mg by mouth daily at bedtime THC gummies for sleep      omeprazole (PriLOSEC) 20 mg delayed release capsule Take 20 mg by mouth daily as needed       potassium chloride (K-DUR,KLOR-CON) 20 mEq tablet Take 1 tablet (20 mEq total) by mouth daily 90 tablet 3    Rimegepant Sulfate (Nurtec) 75 MG TBDP Take 75 mg by mouth every other day (Patient taking differently: Take 75 mg by mouth if needed) 16 tablet 11    verapamil (CALAN-SR) 240 mg CR tablet take 1 tablet by mouth once daily at bedtime 90 tablet 1    zolpidem (AMBIEN) 5 mg tablet take 1 tablet by mouth at bedtime if needed for sleep 30 tablet 1     No current facility-administered medications on file prior to visit.     Social History     Socioeconomic History    Marital status: /Civil Union     Spouse name: Andreas    Number of children: 2    Years of education: Not on file    Highest education level: Not on file   Occupational History    Occupation:    Tobacco Use    Smoking status: Never    Smokeless tobacco: Never   Vaping Use    Vaping status: Never Used   Substance and Sexual Activity    Alcohol use: Not Currently     Comment: Consume 3-10  drinks/ year    Drug use: Yes     Types: Marijuana     Comment: thc gummies    Sexual activity: Yes     Partners: Male     Birth  "control/protection: None   Other Topics Concern    Not on file   Social History Narrative    Caffeine use        House built in 1980    Main source of heat is coal stove, also has wood burning stove for extreme cold temps and as backup has electric baseboard heat.    Central AC    Hardwood floor with area rug in bedroom    Dehumidifier in basement.    Basement is dry, finished, some musty smell after rain and sometimes when AC turns off before starting up coal stove.    No humidifier or air purifier.    Lives with  and 2 daughters        Pets - 3 dogs - Jeet, Sera, Brodie            Caffeine - Coffee 1 large every morning    Tea - iced - 5 to 6 days a week - 2 cups a day    Soda - rarely    Chocolate - small amount occasionally     Social Determinants of Health     Financial Resource Strain: Not on file   Food Insecurity: Not on file   Transportation Needs: Not on file   Physical Activity: Inactive (8/25/2020)    Exercise Vital Sign     Days of Exercise per Week: 0 days     Minutes of Exercise per Session: 0 min   Stress: Not on file   Social Connections: Not on file   Intimate Partner Violence: Not on file   Housing Stability: Not on file         I have reviewed the past medical, surgical, social and family history, medications and allergies as documented in the EMR.    Review of systems: ROS is negative other than that noted in the HPI.  Psychiatric/Behavioral: Negative for agitation.     Physical Exam:    Blood pressure 135/90, pulse 79, height 5' 6\" (1.676 m), weight 90.7 kg (200 lb).    General/Constitutional: NAD, well developed, well nourished  HENT: Normocephalic, atraumatic  CV: Intact distal pulses, regular rate  Resp: No respiratory distress or labored breathing  Neuro: Alert and Oriented x 3  Psych: Normal mood, normal affect, normal judgement, normal behavior  Skin: Warm, dry, no rashes, no erythema      Focused right Elbow Exam:  Skin is intact.  No ecchymosis, erythema or effusion noted on " exam.    Full range of motion of the elbow including full extension, full flexion, full pronation, and full supination without pain or mechanical block    Tenderness over the lateral epicondyle and extensor muscles.  No tenderness over the medial epicondyle, olecranon, radial head, or proximal wrist flexors.      No tenderness and negative Tinel's over the cubital tunnel.  No subluxation of the ulnar nerve with flexion or extension of the elbow. Pain with elbow flexion.    5/5 strength with elbow flexion and extension, wrist flexion and extension, forearm pronation and supination.  Pain with resisted wrist extension and finger extension.  No pain with resisted finger flexion.  No pain with resisted pronation or supination of the forearm.     Negative hook test of the distal biceps tendon.    Elbow is stable to 0 and 30 degrees of varus and valgus stress at both 0 and 30 degrees of flexion.  Negative moving valgus stress test.    UE NV Exam: +2 Radial pulses bilaterally  Sensation intact to light touch C5-T1 bilaterally, Radial/median/ulnar nerve motor intact    Bilateral shoulder, wrist/hand, and forearm ROM full, painless with passive ROM, no ttp or crepitance throughout extremities above wrist joint    Cervical ROM is full without pain, numbness or tingling    Negative spurling maneuver bilaterally       Elbow Imaging:    X-rays of the right elbow were obtained 4/29/2024 and reviewed with the patient.  Based on my independent review, imaging shows no acute osseous abnormalities include no fracture, dislocation, or significant degenerative disease.        Scribe Attestation      I,:  Walter Wilson PA-C am acting as a scribe while in the presence of the attending physician.:       I,:  Stalin Silverio MD personally performed the services described in this documentation    as scribed in my presence.:

## 2024-07-06 ENCOUNTER — APPOINTMENT (OUTPATIENT)
Dept: LAB | Facility: HOSPITAL | Age: 54
End: 2024-07-06
Payer: COMMERCIAL

## 2024-07-06 DIAGNOSIS — J30.89 ALLERGIC RHINITIS DUE TO HOUSE DUST MITE: ICD-10-CM

## 2024-07-06 DIAGNOSIS — E55.9 VITAMIN D DEFICIENCY: ICD-10-CM

## 2024-07-06 DIAGNOSIS — J30.1 CHRONIC SEASONAL ALLERGIC RHINITIS DUE TO POLLEN: ICD-10-CM

## 2024-07-06 DIAGNOSIS — F41.9 ANXIETY: ICD-10-CM

## 2024-07-06 DIAGNOSIS — F33.0 MILD EPISODE OF RECURRENT MAJOR DEPRESSIVE DISORDER (HCC): ICD-10-CM

## 2024-07-06 DIAGNOSIS — R53.83 FATIGUE, UNSPECIFIED TYPE: ICD-10-CM

## 2024-07-06 DIAGNOSIS — M25.50 ARTHRALGIA, UNSPECIFIED JOINT: ICD-10-CM

## 2024-07-06 DIAGNOSIS — I10 PRIMARY HYPERTENSION: ICD-10-CM

## 2024-07-06 DIAGNOSIS — E78.2 MIXED HYPERLIPIDEMIA: ICD-10-CM

## 2024-07-06 LAB
25(OH)D3 SERPL-MCNC: 32.8 NG/ML (ref 30–100)
ALBUMIN SERPL BCG-MCNC: 4.3 G/DL (ref 3.5–5)
ALP SERPL-CCNC: 86 U/L (ref 34–104)
ALT SERPL W P-5'-P-CCNC: 8 U/L (ref 7–52)
ANA SER QL IA: NEGATIVE
ANION GAP SERPL CALCULATED.3IONS-SCNC: 9 MMOL/L (ref 4–13)
AST SERPL W P-5'-P-CCNC: 11 U/L (ref 13–39)
BASOPHILS # BLD AUTO: 0.03 THOUSANDS/ÂΜL (ref 0–0.1)
BASOPHILS NFR BLD AUTO: 1 % (ref 0–1)
BILIRUB SERPL-MCNC: 0.52 MG/DL (ref 0.2–1)
BUN SERPL-MCNC: 8 MG/DL (ref 5–25)
CALCIUM SERPL-MCNC: 9.3 MG/DL (ref 8.4–10.2)
CHLORIDE SERPL-SCNC: 106 MMOL/L (ref 96–108)
CHOLEST SERPL-MCNC: 156 MG/DL
CO2 SERPL-SCNC: 26 MMOL/L (ref 21–32)
CREAT SERPL-MCNC: 0.94 MG/DL (ref 0.6–1.3)
CRP SERPL QL: 2.3 MG/L
EOSINOPHIL # BLD AUTO: 0.18 THOUSAND/ÂΜL (ref 0–0.61)
EOSINOPHIL NFR BLD AUTO: 3 % (ref 0–6)
ERYTHROCYTE [DISTWIDTH] IN BLOOD BY AUTOMATED COUNT: 12 % (ref 11.6–15.1)
GFR SERPL CREATININE-BSD FRML MDRD: 69 ML/MIN/1.73SQ M
GLUCOSE P FAST SERPL-MCNC: 88 MG/DL (ref 65–99)
HCT VFR BLD AUTO: 43.8 % (ref 34.8–46.1)
HDLC SERPL-MCNC: 52 MG/DL
HGB BLD-MCNC: 14.9 G/DL (ref 11.5–15.4)
IMM GRANULOCYTES # BLD AUTO: 0.01 THOUSAND/UL (ref 0–0.2)
IMM GRANULOCYTES NFR BLD AUTO: 0 % (ref 0–2)
LDLC SERPL CALC-MCNC: 81 MG/DL (ref 0–100)
LYMPHOCYTES # BLD AUTO: 2.44 THOUSANDS/ÂΜL (ref 0.6–4.47)
LYMPHOCYTES NFR BLD AUTO: 40 % (ref 14–44)
MCH RBC QN AUTO: 31.2 PG (ref 26.8–34.3)
MCHC RBC AUTO-ENTMCNC: 34 G/DL (ref 31.4–37.4)
MCV RBC AUTO: 92 FL (ref 82–98)
MONOCYTES # BLD AUTO: 0.52 THOUSAND/ÂΜL (ref 0.17–1.22)
MONOCYTES NFR BLD AUTO: 9 % (ref 4–12)
NEUTROPHILS # BLD AUTO: 2.93 THOUSANDS/ÂΜL (ref 1.85–7.62)
NEUTS SEG NFR BLD AUTO: 47 % (ref 43–75)
NONHDLC SERPL-MCNC: 104 MG/DL
NRBC BLD AUTO-RTO: 0 /100 WBCS
PLATELET # BLD AUTO: 325 THOUSANDS/UL (ref 149–390)
PMV BLD AUTO: 9.2 FL (ref 8.9–12.7)
POTASSIUM SERPL-SCNC: 3.6 MMOL/L (ref 3.5–5.3)
PROT SERPL-MCNC: 7.5 G/DL (ref 6.4–8.4)
RBC # BLD AUTO: 4.77 MILLION/UL (ref 3.81–5.12)
SODIUM SERPL-SCNC: 141 MMOL/L (ref 135–147)
TRIGL SERPL-MCNC: 115 MG/DL
TSH SERPL DL<=0.05 MIU/L-ACNC: 7.1 UIU/ML (ref 0.45–4.5)
WBC # BLD AUTO: 6.11 THOUSAND/UL (ref 4.31–10.16)

## 2024-07-06 PROCEDURE — 85025 COMPLETE CBC W/AUTO DIFF WBC: CPT

## 2024-07-06 PROCEDURE — 86140 C-REACTIVE PROTEIN: CPT

## 2024-07-06 PROCEDURE — 36415 COLL VENOUS BLD VENIPUNCTURE: CPT

## 2024-07-06 PROCEDURE — 84443 ASSAY THYROID STIM HORMONE: CPT

## 2024-07-06 PROCEDURE — 82306 VITAMIN D 25 HYDROXY: CPT

## 2024-07-06 PROCEDURE — 86430 RHEUMATOID FACTOR TEST QUAL: CPT

## 2024-07-06 PROCEDURE — 86038 ANTINUCLEAR ANTIBODIES: CPT

## 2024-07-06 PROCEDURE — 80061 LIPID PANEL: CPT

## 2024-07-06 PROCEDURE — 80053 COMPREHEN METABOLIC PANEL: CPT

## 2024-07-08 LAB — RHEUMATOID FACT SER QL LA: NEGATIVE

## 2024-07-17 ENCOUNTER — TELEPHONE (OUTPATIENT)
Age: 54
End: 2024-07-17

## 2024-07-17 NOTE — TELEPHONE ENCOUNTER
Patient called to have her followup rescheduled that she had to cancel last week due to illness.  Dr. Dyer does not have any openings until 8/13/24.  Patient states she needs to have her medications adjusted and would appreciate a sooner appointment to have this done.  Please call patient if able to get her in sooner.

## 2024-07-22 ENCOUNTER — OFFICE VISIT (OUTPATIENT)
Dept: OBGYN CLINIC | Facility: CLINIC | Age: 54
End: 2024-07-22
Payer: OTHER MISCELLANEOUS

## 2024-07-22 VITALS
HEART RATE: 78 BPM | WEIGHT: 198 LBS | DIASTOLIC BLOOD PRESSURE: 88 MMHG | HEIGHT: 66 IN | SYSTOLIC BLOOD PRESSURE: 134 MMHG | BODY MASS INDEX: 31.82 KG/M2

## 2024-07-22 DIAGNOSIS — M77.11 LATERAL EPICONDYLITIS OF RIGHT ELBOW: Primary | ICD-10-CM

## 2024-07-22 PROCEDURE — 99213 OFFICE O/P EST LOW 20 MIN: CPT | Performed by: STUDENT IN AN ORGANIZED HEALTH CARE EDUCATION/TRAINING PROGRAM

## 2024-07-22 NOTE — PROGRESS NOTES
Ortho Sports Medicine New Patient Elbow Visit     Assesment:   53 y.o.female with right elbow pain without a specific injury and exam consistent with lateral epicondylitis.    Plan:  I reviewed the history and exam with the patient in clinic today.  Patient continues to have tenderness over the lateral epicondyle exacerbated by finger extension.  Patient has been doing home exercises and wearing an elbow brace and states that her symptoms have improved slightly and have certainly not gotten worse.  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  I discussed potential treatment options with the patient including continued physical therapy, wearing a wrist brace at work to limit wrist extension, and possible corticosteroid injection.  I also discussed trying Voltaren gel the patient could apply to the lateral elbow as she wants to avoid taking anti-inflammatory oral medications.  The patient was amenable to continuing with the home exercise program, wearing a wrist brace at work, and trying the Voltaren gel.  I recommended the patient follow-up in 6 weeks for repeat evaluation.  I discussed that at that point if her symptoms have not improved we could discuss a corticosteroid injection into the extensor tendons. The patient demonstrated understanding of the discussion and was in agreement with the plan.  All of the questions were answered.  Patient can reach out to clinic with any questions or concerns at any time.    Conservative treatment:  Ice to elbow 1-2 times daily, for 20 minutes at a time.  Continue HEP for the right elbow. Would recommend 3 months of therapy prior to an injection. Patient declined an injection today.  Wrist brace to wear at night. May increase to wearing it daily at work  May try an elbow pad over the lateral side of the elbow at night to limit motion and aid in comfort. May purchase this OTC.  Provided with prescription for voltaren gel to apply to  "the lateral elbow    Imaging:  All imaging from prior to today was reviewed by myself and explained to the patient.     Injection:  No Injection planned at this time. May consider an injection in the future pending clinical course.    Surgery:  No surgery is recommended at this point, continue with conservative treatment plan as noted.    Follow up:  Return in about 6 weeks (around 9/2/2024).        Chief Complaint   Patient presents with    Right Elbow - Pain       History of Present Illness:  The patient is a 53 y.o. RHD female seen in clinic for a 6 week follow up of the right elbow for pain in the setting of lateral epicondyltiis. At the patient's last appointment she was referred to PT and provided a wrist brace. Since that time she states she has continued pain but less pain with lifting lighter objects. She states she continues to have pain at night despite the brace. She has been doing a HEP from the internet at she was unable to attend PT. She states the pain continues to be sharp and she also describes the pain as like a \"brush burn\". The pain originally started several years ago.  There was no specific injury or inciting event.  Patient previously was evaluated for cubital tunnel including EMG which was negative.  She was treated nonoperatively with physical therapy and her symptoms improved.  Pain remains over the lateral aspect of the elbow. Pain can radiate to to her fingers.  She has been taking ibuprofen as needed.  She denies any numbness or tingling to fingers.  She states that in the past therapy including stretching has helped with her symptoms as does ice. She is hesitatnt to take more than ibuprofen daily due to her mother having hx of GIB from NSAIDs.    Occupation involves typing.    Hand/wrist Surgical History:  None    Past Medical, Social and Family History:  Past Medical History:   Diagnosis Date    Allergic     Anxiety     Asthma     seasonal allergy induced    Bruxism (teeth grinding)  "    Claustrophobia     Endometriosis     Esophageal reflux     GERD (gastroesophageal reflux disease)     Headache(784.0)     High cholesterol     Hypertension     Kidney stone     Migraines     Plantar fasciitis     Seasonal allergies     Shingles 2017    Sleep apnea     can't use CPAP    Swelling of both lower extremities     Wears contact lenses     Wears glasses      Past Surgical History:   Procedure Laterality Date    BONE GRAFT      L jaw for future false tooth implant     SECTION      FL RETROGRADE PYELOGRAM  2023    FL RETROGRADE PYELOGRAM  2023    FOOT SURGERY Left     LAPAROSCOPY      of uterus    [place] CYSTO/URETERO W/LITHOTRIPSY &INDWELL STENT INSRT Left 2023    Procedure: CYSTOSCOPY  RETROGRADE PYELOGRAM AND INSERTION STENT URETERAL;  Surgeon: Nick Gallagher MD;  Location: [facility];  Service: Urology    [place] CYSTO/URETERO W/LITHOTRIPSY &INDWELL STENT INSRT Left 2023    Procedure: CYSTO USCOPE W/  LASER, &STENT;  Surgeon: Lee Holliday MD;  Location: [facility];  Service: Urology    OR CYSTO/URETERO W/LITHOTRIPSY &INDWELL STENT INSRT Left 8/15/2023    Procedure: CYSTOSCOPY URETEROSCOPY WITH LITHOTRIPSY HOLMIUM LASER, RETROGRADE PYELOGRAM AND INSERTION STENT URETERAL;  Surgeon: Ruben Donahue MD;  Location: [facility];  Service: Urology    TOOTH EXTRACTION      3 besides wisdom teeth    WISDOM TOOTH EXTRACTION      2 of teeth     Allergies   Allergen Reactions    Gabapentin Swelling     In lymph node     Current Outpatient Medications on File Prior to Visit   Medication Sig Dispense Refill    albuterol (PROVENTIL HFA,VENTOLIN HFA) 90 mcg/act inhaler Inhale 2 puffs every 6 (six) hours as needed for wheezing      atorvastatin (LIPITOR) 20 mg tablet take 1 tablet by mouth once daily 90 tablet 3    buPROPion (WELLBUTRIN XL) 150 mg 24 hr tablet Take 1 tablet (150 mg total) by mouth every morning 90 tablet 3    Cholecalciferol (VITAMIN D3 PO) Take by mouth       fluticasone (FLONASE) 50 mcg/act nasal spray 2 sprays into each nostril daily 16 g 11    ibuprofen (MOTRIN) 200 mg tablet Take by mouth every 6 (six) hours as needed for mild pain      NON FORMULARY Take 25 mg by mouth daily at bedtime THC gummies for sleep      omeprazole (PriLOSEC) 20 mg delayed release capsule Take 20 mg by mouth daily as needed       potassium chloride (K-DUR,KLOR-CON) 20 mEq tablet Take 1 tablet (20 mEq total) by mouth daily 90 tablet 3    verapamil (CALAN-SR) 240 mg CR tablet take 1 tablet by mouth once daily at bedtime 90 tablet 1    acetaminophen (TYLENOL) 325 mg tablet Take 2 tablets (650 mg total) by mouth every 6 (six) hours as needed for mild pain, fever or headaches (Patient not taking: Reported on 7/22/2024) 30 tablet 0    ALPRAZolam (XANAX) 0.5 mg tablet Take 1 tablet (0.5 mg total) by mouth daily at bedtime as needed for anxiety (Patient not taking: Reported on 7/22/2024) 30 tablet 0    Rimegepant Sulfate (Nurtec) 75 MG TBDP Take 75 mg by mouth every other day (Patient not taking: Reported on 7/22/2024) 16 tablet 11    zolpidem (AMBIEN) 5 mg tablet take 1 tablet by mouth at bedtime if needed for sleep (Patient not taking: Reported on 7/22/2024) 30 tablet 1     No current facility-administered medications on file prior to visit.     Social History     Socioeconomic History    Marital status: /Civil Union     Spouse name: Andreas    Number of children: 2    Years of education: Not on file    Highest education level: Not on file   Occupational History    Occupation:    Tobacco Use    Smoking status: Never    Smokeless tobacco: Never   Vaping Use    Vaping status: Never Used   Substance and Sexual Activity    Alcohol use: Not Currently     Comment: Consume 3-10  drinks/ year    Drug use: Yes     Types: Marijuana     Comment: thc gummies    Sexual activity: Yes     Partners: Male     Birth control/protection: None   Other Topics Concern    Not on file   Social History  "Narrative    Caffeine use        House built in 1980    Main source of heat is coal stove, also has wood burning stove for extreme cold temps and as backup has electric baseboard heat.    Central AC    Hardwood floor with area rug in bedroom    Dehumidifier in basement.    Basement is dry, finished, some musty smell after rain and sometimes when AC turns off before starting up coal stove.    No humidifier or air purifier.    Lives with  and 2 daughters        Pets - 3 dogs - Jeet, Sera, [place]            Caffeine - Coffee 1 large every morning    Tea - iced - 5 to 6 days a week - 2 cups a day    Soda - rarely    Chocolate - small amount occasionally     Social Determinants of Health     Financial Resource Strain: Not on file   Food Insecurity: Not on file   Transportation Needs: Not on file   Physical Activity: Inactive (8/25/2020)    Exercise Vital Sign     Days of Exercise per Week: 0 days     Minutes of Exercise per Session: 0 min   Stress: Not on file   Social Connections: Not on file   Intimate Partner Violence: Not on file   Housing Stability: Not on file         I have reviewed the past medical, surgical, social and family history, medications and allergies as documented in the EMR.    Review of systems: ROS is negative other than that noted in the HPI.  Psychiatric/Behavioral: Negative for agitation.     Physical Exam:    Blood pressure 134/88, pulse 78, height 5' 6\" (1.676 m), weight 89.8 kg (198 lb).    General/Constitutional: NAD, well developed, well nourished  HENT: Normocephalic, atraumatic  CV: Intact distal pulses, regular rate  Resp: No respiratory distress or labored breathing  Neuro: Alert and Oriented x 3  Psych: Normal mood, normal affect, normal judgement, normal behavior  Skin: Warm, dry, no rashes, no erythema      Focused right Elbow Exam:  Skin is intact.  No ecchymosis, erythema or effusion noted on exam.    Full range of motion of the elbow including full extension, full flexion, " full pronation, and full supination without pain or mechanical block    Tenderness over the lateral epicondyle and extensor muscles.  No tenderness over the medial epicondyle, olecranon, radial head, or proximal wrist flexors.      No tenderness and negative Tinel's over the cubital tunnel.  No subluxation of the ulnar nerve with flexion or extension of the elbow. Pain with elbow flexion.    5/5 strength with elbow flexion and extension, wrist flexion and extension, forearm pronation and supination.  Pain with resisted wrist extension and finger extension.  No pain with resisted finger flexion.  No pain with resisted pronation or supination of the forearm.     Negative hook test of the distal biceps tendon.    Elbow is stable to 0 and 30 degrees of varus and valgus stress at both 0 and 30 degrees of flexion.  Negative moving valgus stress test.    UE NV Exam: +2 Radial pulses bilaterally  Sensation intact to light touch C5-T1 bilaterally, Radial/median/ulnar nerve motor intact    Bilateral shoulder, wrist/hand, and forearm ROM full, painless with passive ROM, no ttp or crepitance throughout extremities above wrist joint    Cervical ROM is full without pain, numbness or tingling    Negative spurling maneuver bilaterally       Elbow Imaging:    X-rays of the right elbow were obtained 4/29/2024 and reviewed with the patient.  Based on my independent review, imaging shows no acute osseous abnormalities include no fracture, dislocation, or significant degenerative disease.        Scribe Attestation      I,:   am acting as a scribe while in the presence of the attending physician.:       I,:   personally performed the services described in this documentation    as scribed in my presence.:

## 2024-07-24 ENCOUNTER — OFFICE VISIT (OUTPATIENT)
Dept: INTERNAL MEDICINE CLINIC | Facility: CLINIC | Age: 54
End: 2024-07-24
Payer: COMMERCIAL

## 2024-07-24 VITALS
WEIGHT: 198.8 LBS | HEIGHT: 66 IN | SYSTOLIC BLOOD PRESSURE: 138 MMHG | HEART RATE: 82 BPM | TEMPERATURE: 98.1 F | BODY MASS INDEX: 31.95 KG/M2 | DIASTOLIC BLOOD PRESSURE: 86 MMHG | OXYGEN SATURATION: 96 %

## 2024-07-24 DIAGNOSIS — F41.9 ANXIETY: ICD-10-CM

## 2024-07-24 DIAGNOSIS — I10 PRIMARY HYPERTENSION: ICD-10-CM

## 2024-07-24 DIAGNOSIS — G43.709 CHRONIC MIGRAINE WITHOUT AURA WITHOUT STATUS MIGRAINOSUS, NOT INTRACTABLE: ICD-10-CM

## 2024-07-24 DIAGNOSIS — F33.0 MILD EPISODE OF RECURRENT MAJOR DEPRESSIVE DISORDER (HCC): Primary | ICD-10-CM

## 2024-07-24 DIAGNOSIS — Z01.419 ENCOUNTER FOR GYNECOLOGICAL EXAMINATION: ICD-10-CM

## 2024-07-24 DIAGNOSIS — R94.6 NONSPECIFIC ABNORMAL RESULTS OF FUNCTION STUDY OF THYROID: ICD-10-CM

## 2024-07-24 DIAGNOSIS — E55.9 VITAMIN D DEFICIENCY: ICD-10-CM

## 2024-07-24 DIAGNOSIS — E78.2 MIXED HYPERLIPIDEMIA: ICD-10-CM

## 2024-07-24 DIAGNOSIS — E66.9 OBESITY (BMI 30-39.9): ICD-10-CM

## 2024-07-24 PROCEDURE — 99214 OFFICE O/P EST MOD 30 MIN: CPT | Performed by: FAMILY MEDICINE

## 2024-07-24 RX ORDER — ESCITALOPRAM OXALATE 5 MG/1
5 TABLET ORAL DAILY
Qty: 30 TABLET | Refills: 5 | Status: SHIPPED | OUTPATIENT
Start: 2024-07-24

## 2024-07-24 NOTE — PROGRESS NOTES
Ambulatory Visit  Name: Tommy Busch      : 1970      MRN: [id number]  Encounter Provider: Rylie Dyer MD  Encounter Date: 2024   Encounter department: [facility]    Assessment & Plan   1. Mild episode of recurrent major depressive disorder (HCC)  -     escitalopram (LEXAPRO) 5 mg tablet; Take 1 tablet (5 mg total) by mouth daily  -     TSH, 3rd generation with Free T4 reflex; Future  2. Anxiety  -     escitalopram (LEXAPRO) 5 mg tablet; Take 1 tablet (5 mg total) by mouth daily  -     TSH, 3rd generation with Free T4 reflex; Future  3. Primary hypertension  4. Chronic migraine without aura without status migrainosus, not intractable  5. Vitamin D deficiency  6. Mixed hyperlipidemia  -     TSH, 3rd generation with Free T4 reflex; Future  7. Obesity (BMI 30-39.9)  8. Encounter for gynecological examination  -     Ambulatory Referral to Obstetrics / Gynecology; Future  9. Nonspecific abnormal results of function study of thyroid  -     TSH, 3rd generation with Free T4 reflex; Future    Orders and recommendations as noted above.  Depression symptoms have improved with the Wellbutrin but the anxiety symptoms persist and may be slightly worse with the Wellbutrin.  She does wish to continue this since she does feel somewhat better on it.  Will add Lexapro for additional improvement in the depression symptoms and to help hopefully with the anxiety.  Continue to follow-up with neurology regarding the migraines.  Blood pressure slightly more elevated but she feels this is related to the recent stressors.  Recent laboratory testing reviewed with her.  TSH was elevated.  Will recheck this in approximately 4 weeks.  Continue with the atorvastatin at previously.  Will have her follow-up in about 4 to 6 weeks or sooner if needed.     History of Present Illness     She presents for follow-up.  Continues to be under a lot of stressors.  Still is grieving the loss of her mother who   about 6 months ago.  Still with a lot of stress at work.  Some stressors at home with her .  Has noticed some increased energy and some decreased depression symptoms with the Wellbutrin.  Does feel somewhat better but has noticed that her anxiety symptoms have worsened.  Seems more anxious at times and emotions have been more variable.  Has been crying more.  Has been having some difficulty at work with concentration.  Has noticed some increase in her headaches.  Continues to follow-up with neurology.  Tolerating her verapamil without difficulty.  Continues with the atorvastatin.  Appetite has been variable.  Sleep has improved slightly.  Takes the Xanax on an as-needed basis but this tends to make her have difficulty getting up in the morning.  Does not take the Ambien regularly.        Review of Systems   Constitutional:  Positive for activity change and fatigue. Negative for appetite change, chills and fever.   HENT:  Positive for congestion. Negative for rhinorrhea.    Eyes:  Negative for visual disturbance.   Respiratory:  Negative for chest tightness and shortness of breath.    Cardiovascular:  Negative for chest pain and palpitations.   Gastrointestinal:  Negative for abdominal pain, blood in stool, diarrhea, nausea and vomiting.   Endocrine: Negative for polydipsia, polyphagia and polyuria.   Genitourinary:  Negative for dysuria, frequency and urgency.   Musculoskeletal:  Negative for gait problem.   Skin:  Negative for color change.   Allergic/Immunologic: Positive for environmental allergies.   Neurological:  Positive for headaches. Negative for dizziness.   Hematological:  Does not bruise/bleed easily.   Psychiatric/Behavioral:  Positive for decreased concentration, dysphoric mood and sleep disturbance. Negative for confusion. The patient is nervous/anxious.      Medical History Reviewed by provider this encounter:       Past Medical History   Past Medical History:   Diagnosis Date    Allergic      Anxiety     Asthma     seasonal allergy induced    Bruxism (teeth grinding)     Claustrophobia     Endometriosis     Esophageal reflux     GERD (gastroesophageal reflux disease)     Headache(784.0)     High cholesterol     Hypertension     Kidney stone     Migraines     Plantar fasciitis     Seasonal allergies     Shingles 2017    Sleep apnea     can't use CPAP    Swelling of both lower extremities     Wears contact lenses     Wears glasses      Past Surgical History:   Procedure Laterality Date    BONE GRAFT      L jaw for future false tooth implant     SECTION      FL RETROGRADE PYELOGRAM  2023    FL RETROGRADE PYELOGRAM  2023    FOOT SURGERY Left     LAPAROSCOPY      of uterus    CO CYSTO/URETERO W/LITHOTRIPSY &INDWELL STENT INSRT Left 2023    Procedure: CYSTOSCOPY  RETROGRADE PYELOGRAM AND INSERTION STENT URETERAL;  Surgeon: Nick Gallagher MD;  [facility]: [facility];  Service: Urology    CO CYSTO/URETERO W/LITHOTRIPSY &INDWELL STENT INSRT Left 2023    Procedure: CYSTO USCOPE W/  LASER, &STENT;  Surgeon: Lee Holliday MD;  Location: [facility];  Service: Urology    CO CYSTO/URETERO W/LITHOTRIPSY &INDWELL STENT INSRT Left 8/15/2023    Procedure: CYSTOSCOPY URETEROSCOPY WITH LITHOTRIPSY HOLMIUM LASER, RETROGRADE PYELOGRAM AND INSERTION STENT URETERAL;  Surgeon: Ruben Donahue MD;  Location: [facility];  Service: Urology    TOOTH EXTRACTION      3 besides wisdom teeth    WISDOM TOOTH EXTRACTION      2 of teeth     Family History   Adopted: Yes   Problem Relation Age of Onset    Heart murmur Mother     Depression Mother     Other Mother         C-spine injury    Pancreatic cancer Mother 73        2023 diagnosed    Colon cancer Mother         Pancreatic cancer/    Hypertension Father             Abdominal aortic aneurysm Father     Raynaud syndrome Sister     Allergies Sister     Hypothyroidism Sister     Raynaud syndrome Sister     Bipolar  disorder Sister     Allergies Sister     Allergies Daughter         Environmental    Allergies Daughter         Environmental    Colon cancer Maternal Grandmother         Colorectal cancer/    No Known Problems Maternal Grandfather     No Known Problems Paternal Grandmother     No Known Problems Paternal Grandfather     Asperger's syndrome Brother     Allergies Brother     Cancer Maternal Uncle     Asperger's syndrome Family         disorder    Diabetes Family         DM    Bipolar disorder Family     Depression Family     Hypertension Family     Raynaud syndrome Family         phenomenon     Current Outpatient Medications on File Prior to Visit   Medication Sig Dispense Refill    acetaminophen (TYLENOL) 325 mg tablet Take 2 tablets (650 mg total) by mouth every 6 (six) hours as needed for mild pain, fever or headaches 30 tablet 0    albuterol (PROVENTIL HFA,VENTOLIN HFA) 90 mcg/act inhaler Inhale 2 puffs every 6 (six) hours as needed for wheezing      ALPRAZolam (XANAX) 0.5 mg tablet Take 1 tablet (0.5 mg total) by mouth daily at bedtime as needed for anxiety 30 tablet 0    atorvastatin (LIPITOR) 20 mg tablet take 1 tablet by mouth once daily 90 tablet 3    buPROPion (WELLBUTRIN XL) 150 mg 24 hr tablet Take 1 tablet (150 mg total) by mouth every morning 90 tablet 3    Cholecalciferol (VITAMIN D3 PO) Take by mouth      fluticasone (FLONASE) 50 mcg/act nasal spray 2 sprays into each nostril daily 16 g 11    ibuprofen (MOTRIN) 200 mg tablet Take by mouth every 6 (six) hours as needed for mild pain      NON FORMULARY Take 25 mg by mouth daily at bedtime THC gummies for sleep      omeprazole (PriLOSEC) 20 mg delayed release capsule Take 20 mg by mouth daily as needed       potassium chloride (K-DUR,KLOR-CON) 20 mEq tablet Take 1 tablet (20 mEq total) by mouth daily 90 tablet 3    Rimegepant Sulfate (Nurtec) 75 MG TBDP Take 75 mg by mouth every other day 16 tablet 11    verapamil (CALAN-SR) 240 mg CR tablet take  1 tablet by mouth once daily at bedtime 90 tablet 1    zolpidem (AMBIEN) 5 mg tablet take 1 tablet by mouth at bedtime if needed for sleep 30 tablet 1    Diclofenac Sodium (VOLTAREN) 1 % Apply 2 g topically 4 (four) times a day (Patient not taking: Reported on 7/24/2024) 150 g 0     No current facility-administered medications on file prior to visit.     Allergies   Allergen Reactions    Gabapentin Swelling     In lymph node      Current Outpatient Medications on File Prior to Visit   Medication Sig Dispense Refill    acetaminophen (TYLENOL) 325 mg tablet Take 2 tablets (650 mg total) by mouth every 6 (six) hours as needed for mild pain, fever or headaches 30 tablet 0    albuterol (PROVENTIL HFA,VENTOLIN HFA) 90 mcg/act inhaler Inhale 2 puffs every 6 (six) hours as needed for wheezing      ALPRAZolam (XANAX) 0.5 mg tablet Take 1 tablet (0.5 mg total) by mouth daily at bedtime as needed for anxiety 30 tablet 0    atorvastatin (LIPITOR) 20 mg tablet take 1 tablet by mouth once daily 90 tablet 3    buPROPion (WELLBUTRIN XL) 150 mg 24 hr tablet Take 1 tablet (150 mg total) by mouth every morning 90 tablet 3    Cholecalciferol (VITAMIN D3 PO) Take by mouth      fluticasone (FLONASE) 50 mcg/act nasal spray 2 sprays into each nostril daily 16 g 11    ibuprofen (MOTRIN) 200 mg tablet Take by mouth every 6 (six) hours as needed for mild pain      NON FORMULARY Take 25 mg by mouth daily at bedtime THC gummies for sleep      omeprazole (PriLOSEC) 20 mg delayed release capsule Take 20 mg by mouth daily as needed       potassium chloride (K-DUR,KLOR-CON) 20 mEq tablet Take 1 tablet (20 mEq total) by mouth daily 90 tablet 3    Rimegepant Sulfate (Nurtec) 75 MG TBDP Take 75 mg by mouth every other day 16 tablet 11    verapamil (CALAN-SR) 240 mg CR tablet take 1 tablet by mouth once daily at bedtime 90 tablet 1    zolpidem (AMBIEN) 5 mg tablet take 1 tablet by mouth at bedtime if needed for sleep 30 tablet 1    Diclofenac Sodium  "(VOLTAREN) 1 % Apply 2 g topically 4 (four) times a day (Patient not taking: Reported on 7/24/2024) 150 g 0     No current facility-administered medications on file prior to visit.      Social History     Tobacco Use    Smoking status: Never    Smokeless tobacco: Never   Vaping Use    Vaping status: Never Used   Substance and Sexual Activity    Alcohol use: Not Currently     Comment: Consume 3-10  drinks/ year    Drug use: Yes     Types: Marijuana     Comment: thc gummies    Sexual activity: Yes     Partners: Male     Birth control/protection: None     Objective     /86 (BP Location: Left arm, Patient Position: Sitting, Cuff Size: Large)   Pulse 82   Temp 98.1 °F (36.7 °C)   Ht 5' 6\" (1.676 m)   Wt 90.2 kg (198 lb 12.8 oz)   SpO2 96%   BMI 32.09 kg/m²     Physical Exam  Vitals and nursing note reviewed.   Constitutional:       General: She is not in acute distress.     Appearance: She is well-developed and well-groomed.      Comments: Tearful   HENT:      Head: Normocephalic and atraumatic.   Eyes:      General:         Right eye: No discharge.         Left eye: No discharge.      Conjunctiva/sclera: Conjunctivae normal.      Pupils: Pupils are equal, round, and reactive to light.   Cardiovascular:      Rate and Rhythm: Normal rate and regular rhythm.      Heart sounds: Normal heart sounds. No murmur heard.     No friction rub. No gallop.   Pulmonary:      Effort: No respiratory distress.      Breath sounds: No wheezing or rales.   Abdominal:      General: Bowel sounds are normal. There is no distension.      Tenderness: There is no abdominal tenderness.   Lymphadenopathy:      Cervical: No cervical adenopathy.   Skin:     General: Skin is warm and dry.   Neurological:      Mental Status: She is alert and oriented to person, place, and time.   Psychiatric:         Mood and Affect: Mood is depressed. Affect is tearful.         Speech: Speech normal.         Behavior: Behavior normal. Behavior is " cooperative.         Thought Content: Thought content does not include homicidal or suicidal ideation.         Cognition and Memory: Cognition and memory normal.       Administrative Statements

## 2024-08-22 ENCOUNTER — RA CDI HCC (OUTPATIENT)
Dept: OTHER | Facility: HOSPITAL | Age: 54
End: 2024-08-22

## 2024-08-29 ENCOUNTER — TELEPHONE (OUTPATIENT)
Age: 54
End: 2024-08-29

## 2024-08-29 NOTE — TELEPHONE ENCOUNTER
Pt called, said she faxed forms on 7/24 after her office visit with provider. The forms were for reasonable accommodations for work. Did not see forms in file. She faxed them over again just now. Her HR department needs these reviewed and signed by provider asap and faxed back to #[phone]. Please be on the lookout for these.

## 2024-09-11 ENCOUNTER — OFFICE VISIT (OUTPATIENT)
Dept: OBGYN CLINIC | Facility: CLINIC | Age: 54
End: 2024-09-11
Payer: OTHER MISCELLANEOUS

## 2024-09-11 VITALS
HEIGHT: 66 IN | HEART RATE: 87 BPM | WEIGHT: 199 LBS | BODY MASS INDEX: 31.98 KG/M2 | SYSTOLIC BLOOD PRESSURE: 129 MMHG | DIASTOLIC BLOOD PRESSURE: 84 MMHG

## 2024-09-11 DIAGNOSIS — M77.11 LATERAL EPICONDYLITIS OF RIGHT ELBOW: Primary | ICD-10-CM

## 2024-09-11 PROCEDURE — 99213 OFFICE O/P EST LOW 20 MIN: CPT | Performed by: STUDENT IN AN ORGANIZED HEALTH CARE EDUCATION/TRAINING PROGRAM

## 2024-09-11 NOTE — PROGRESS NOTES
Ortho Sports Medicine New Patient Elbow Visit     Assesment:   53 y.o.female with right elbow pain without a specific injury and exam consistent with lateral epicondylitis.    Plan:  I reviewed an updated history and exam with the patient in clinic today.  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 On exam today, the patient is no longer point tender over the lateral epicondyle or extensor muscle group. The patient states that her pain has significantly increased over the past week or two. She has been using a counter force strap at the elbow which she feels improved her pain. The patient should continue use of the brace and home exercise program. She will follow-up on an as needed basis if the symptoms persist or worsen. The patient demonstrated understanding of the discussion and was in agreement with the plan.  All of the questions were answered.  Patient can reach out to clinic with any questions or concerns at any time.      Follow up:  Return if symptoms worsen or fail to improve.        Chief Complaint   Patient presents with    Right Elbow - Follow-up       History of Present Illness:  The patient is a 53 y.o. RHD female presents for follow-up evaluation of her right elbow. The patient has experienced a flare-up of lateral epicondylitis since mid April. She was last seen in office on 7/22/2024 where she was agreeable to a home exercise program, voltaren gel, and a wrist brace. The patient also began using a counter force strap at the elbow. She states that the voltaren gel did not provide much relief. She had a hard time using the wrist brace for computer activities.  However, the patient states that the pain has significantly improved since her last visit. She states that she was beginning to feel frustrated with the elbow pain however, over the past week or two her pain began to improve. She states that the pain is minimal at time of visit.     Occupation involves  typing.    Hand/wrist Surgical History:  None    Past Medical, Social and Family History:  Past Medical History:   Diagnosis Date    Allergic     Anxiety     Asthma     seasonal allergy induced    Bruxism (teeth grinding)     Claustrophobia     Endometriosis     Esophageal reflux     GERD (gastroesophageal reflux disease)     Headache(784.0)     High cholesterol     Hypertension     Kidney stone     Migraines     Plantar fasciitis     Seasonal allergies     Shingles 2017    Sleep apnea     can't use CPAP    Swelling of both lower extremities     Wears contact lenses     Wears glasses      Past Surgical History:   Procedure Laterality Date    BONE GRAFT      L jaw for future false tooth implant     SECTION      FL RETROGRADE PYELOGRAM  2023    FL RETROGRADE PYELOGRAM  2023    FOOT SURGERY Left     LAPAROSCOPY      of uterus    [place] CYSTO/URETERO W/LITHOTRIPSY &INDWELL STENT INSRT Left 2023    Procedure: CYSTOSCOPY  RETROGRADE PYELOGRAM AND INSERTION STENT URETERAL;  Surgeon: Nick Gallagher MD;  Location: [facility];  Service: Urology    NC CYSTO/URETERO W/LITHOTRIPSY &INDWELL STENT INSRT Left 2023    Procedure: CYSTO USCOPE W/  LASER, &STENT;  Surgeon: Lee Holliday MD;  Location: [facility];  Service: Urology    NC CYSTO/URETERO W/LITHOTRIPSY &INDWELL STENT INSRT Left 8/15/2023    Procedure: CYSTOSCOPY URETEROSCOPY WITH LITHOTRIPSY HOLMIUM LASER, RETROGRADE PYELOGRAM AND INSERTION STENT URETERAL;  Surgeon: Ruben Donahue MD;  Location:  MAIN OR;  Service: Urology    TOOTH EXTRACTION      3 besides wisdom teeth    WISDOM TOOTH EXTRACTION      2 of teeth     Allergies   Allergen Reactions    Gabapentin Swelling     In lymph node     Current Outpatient Medications on File Prior to Visit   Medication Sig Dispense Refill    acetaminophen (TYLENOL) 325 mg tablet Take 2 tablets (650 mg total) by mouth every 6 (six) hours as needed for mild pain, fever or headaches 30 tablet 0     albuterol (PROVENTIL HFA,VENTOLIN HFA) 90 mcg/act inhaler Inhale 2 puffs every 6 (six) hours as needed for wheezing      ALPRAZolam (XANAX) 0.5 mg tablet Take 1 tablet (0.5 mg total) by mouth daily at bedtime as needed for anxiety 30 tablet 0    atorvastatin (LIPITOR) 20 mg tablet take 1 tablet by mouth once daily 90 tablet 3    buPROPion (WELLBUTRIN XL) 150 mg 24 hr tablet Take 1 tablet (150 mg total) by mouth every morning 90 tablet 3    Cholecalciferol (VITAMIN D3 PO) Take by mouth      escitalopram (LEXAPRO) 5 mg tablet Take 1 tablet (5 mg total) by mouth daily 30 tablet 5    fluticasone (FLONASE) 50 mcg/act nasal spray 2 sprays into each nostril daily 16 g 11    ibuprofen (MOTRIN) 200 mg tablet Take by mouth every 6 (six) hours as needed for mild pain      NON FORMULARY Take 25 mg by mouth daily at bedtime THC gummies for sleep      omeprazole (PriLOSEC) 20 mg delayed release capsule Take 20 mg by mouth daily as needed       potassium chloride (K-DUR,KLOR-CON) 20 mEq tablet Take 1 tablet (20 mEq total) by mouth daily 90 tablet 3    Rimegepant Sulfate (Nurtec) 75 MG TBDP Take 75 mg by mouth every other day 16 tablet 11    verapamil (CALAN-SR) 240 mg CR tablet take 1 tablet by mouth once daily at bedtime 90 tablet 1    zolpidem (AMBIEN) 5 mg tablet take 1 tablet by mouth at bedtime if needed for sleep 30 tablet 1    Diclofenac Sodium (VOLTAREN) 1 % Apply 2 g topically 4 (four) times a day (Patient not taking: Reported on 7/24/2024) 150 g 0     No current facility-administered medications on file prior to visit.     Social History     Socioeconomic History    Marital status: /Civil Union     Spouse name: Andreas    Number of children: 2    Years of education: Not on file    Highest education level: Not on file   Occupational History    Occupation:    Tobacco Use    Smoking status: Never    Smokeless tobacco: Never   Vaping Use    Vaping status: Never Used   Substance and Sexual Activity    Alcohol  "use: Not Currently     Comment: Consume 3-10  drinks/ year    Drug use: Yes     Types: Marijuana     Comment: thc gummies    Sexual activity: Yes     Partners: Male     Birth control/protection: None   Other Topics Concern    Not on file   Social History Narrative    Caffeine use        House built in 1980    Main source of heat is coal stove, also has wood burning stove for extreme cold temps and as backup has electric baseboard heat.    Central AC    Hardwood floor with area rug in bedroom    Dehumidifier in basement.    Basement is dry, finished, some musty smell after rain and sometimes when AC turns off before starting up coal stove.    No humidifier or air purifier.    Lives with  and 2 daughters        Pets - 3 dogs - Jeet, Sera, Brodie            Caffeine - Coffee 1 large every morning    Tea - iced - 5 to 6 days a week - 2 cups a day    Soda - rarely    Chocolate - small amount occasionally     Social Determinants of Health     Financial Resource Strain: Not on file   Food Insecurity: Not on file   Transportation Needs: Not on file   Physical Activity: Inactive (8/25/2020)    Exercise Vital Sign     Days of Exercise per Week: 0 days     Minutes of Exercise per Session: 0 min   Stress: Not on file   Social Connections: Not on file   Intimate Partner Violence: Not on file   Housing Stability: Not on file         I have reviewed the past medical, surgical, social and family history, medications and allergies as documented in the EMR.    Review of systems: ROS is negative other than that noted in the HPI.  Psychiatric/Behavioral: Negative for agitation.     Physical Exam:    Blood pressure 129/84, pulse 87, height 5' 6\" (1.676 m), weight 90.3 kg (199 lb).    General/Constitutional: NAD, well developed, well nourished  HENT: Normocephalic, atraumatic  CV: Intact distal pulses, regular rate  Resp: No respiratory distress or labored breathing  Neuro: Alert and Oriented x 3  Psych: Normal mood, normal " affect, normal judgement, normal behavior  Skin: Warm, dry, no rashes, no erythema      Focused right Elbow Exam:  Skin is intact.  No ecchymosis, erythema or effusion noted on exam.    Full range of motion of the elbow including full extension, full flexion, full pronation, and full supination without pain or mechanical block    No tenderness over the lateral epicondyle and extensor muscles.  No tenderness over the medial epicondyle, olecranon, radial head, or proximal wrist flexors.      No tenderness and negative Tinel's over the cubital tunnel.  No subluxation of the ulnar nerve with flexion or extension of the elbow. Pain with elbow flexion.    5/5 strength with elbow flexion and extension, wrist flexion and extension, forearm pronation and supination.  Pain with resisted wrist extension and finger extension.  No pain with resisted finger flexion.  No pain with resisted pronation or supination of the forearm.     Negative hook test of the distal biceps tendon.    Elbow is stable to 0 and 30 degrees of varus and valgus stress at both 0 and 30 degrees of flexion.  Negative moving valgus stress test.    UE NV Exam: +2 Radial pulses bilaterally  Sensation intact to light touch C5-T1 bilaterally, Radial/median/ulnar nerve motor intact    Bilateral shoulder, wrist/hand, and forearm ROM full, painless with passive ROM, no ttp or crepitance throughout extremities above wrist joint    Cervical ROM is full without pain, numbness or tingling    Negative spurling maneuver bilaterally       Elbow Imaging:    X-rays of the right elbow were obtained 4/29/2024 and reviewed with the patient.  Based on my independent review, imaging shows no acute osseous abnormalities include no fracture, dislocation, or significant degenerative disease.      Scribe Attestation      I,:  Becca Herrera am acting as a scribe while in the presence of the attending physician.:       I,:  Stalin Silverio MD personally performed the services  described in this documentation    as scribed in my presence.:

## 2024-10-14 ENCOUNTER — LAB (OUTPATIENT)
Dept: LAB | Facility: HOSPITAL | Age: 54
End: 2024-10-14
Payer: COMMERCIAL

## 2024-10-14 DIAGNOSIS — F33.0 MILD EPISODE OF RECURRENT MAJOR DEPRESSIVE DISORDER (HCC): ICD-10-CM

## 2024-10-14 DIAGNOSIS — F41.9 ANXIETY: ICD-10-CM

## 2024-10-14 DIAGNOSIS — E78.2 MIXED HYPERLIPIDEMIA: ICD-10-CM

## 2024-10-14 DIAGNOSIS — R94.6 NONSPECIFIC ABNORMAL RESULTS OF FUNCTION STUDY OF THYROID: ICD-10-CM

## 2024-10-14 LAB — TSH SERPL DL<=0.05 MIU/L-ACNC: 2.96 UIU/ML (ref 0.45–4.5)

## 2024-10-14 PROCEDURE — 84443 ASSAY THYROID STIM HORMONE: CPT

## 2024-10-14 PROCEDURE — 36415 COLL VENOUS BLD VENIPUNCTURE: CPT

## 2024-10-17 DIAGNOSIS — G43.709 CHRONIC MIGRAINE WITHOUT AURA WITHOUT STATUS MIGRAINOSUS, NOT INTRACTABLE: ICD-10-CM

## 2024-10-17 RX ORDER — VERAPAMIL HYDROCHLORIDE 240 MG/1
TABLET, FILM COATED, EXTENDED RELEASE ORAL
Qty: 90 TABLET | Refills: 1 | Status: SHIPPED | OUTPATIENT
Start: 2024-10-17

## 2024-10-22 ENCOUNTER — HOSPITAL ENCOUNTER (OUTPATIENT)
Dept: ULTRASOUND IMAGING | Facility: HOSPITAL | Age: 54
Discharge: HOME | End: 2024-10-22
Payer: COMMERCIAL

## 2024-10-22 DIAGNOSIS — N20.1 URETERAL CALCULUS, LEFT: ICD-10-CM

## 2024-10-22 PROCEDURE — 76775 US EXAM ABDO BACK WALL LIM: CPT

## 2024-10-28 ENCOUNTER — TELEPHONE (OUTPATIENT)
Age: 54
End: 2024-10-28

## 2024-10-28 DIAGNOSIS — N94.9 ADNEXAL CYST: Primary | ICD-10-CM

## 2024-10-28 NOTE — TELEPHONE ENCOUNTER
Significant Findings    Radiology Test Results - Radiology Calling with report update    Pt under care of: Jack Ahn    Ordering Provider: Jack Ahn    Imaging Completed: 10/22/2024    Imaging Test: US Kidney and Bladder    Significant Findings - Please review    Any Questions, please call:  PH: [phone] opt 3

## 2024-10-29 NOTE — TELEPHONE ENCOUNTER
Patient called today stating that she noticed a missed call from the office.    Patient denies received voice message but states that she has issues with her phone and receives voice messages days later.    Please review.    Call back [phone]

## 2024-10-29 NOTE — TELEPHONE ENCOUNTER
I attempted to call back patient to review her recent renal ultrasound results.  This shows stable bilateral stone disease.  However, there is a incidental finding of a 3.8 cm left adnexal cyst.  Radiology recommends dedicated pelvic ultrasound.  I did place an order for this and it appears that she has a scheduled appointment with gynecology on 11/11.  She will need follow-up with me in the near future to review her ultrasound more detail.

## 2024-10-30 NOTE — TELEPHONE ENCOUNTER
Called and spoke with patient to review her renal US results and GWEN Stewart's note. Patient was made aware of incidental finding of left adnexal cyst and that a pelvic US was ordered for further evaluation. She was provided with the central scheduling phone number and will call to schedule the US, hopefully prior to her upcoming appointment with GYN. Patient was also scheduled for an appointment with GWEN Stewart to review her US in further detail. Appointment was scheduled as a virtual visit as patient is unable to take off of work for an appointment at this time.

## 2024-11-11 ENCOUNTER — HOSPITAL ENCOUNTER (OUTPATIENT)
Dept: ULTRASOUND IMAGING | Facility: HOSPITAL | Age: 54
Discharge: HOME | End: 2024-11-11
Payer: COMMERCIAL

## 2024-11-11 DIAGNOSIS — N94.9 ADNEXAL CYST: ICD-10-CM

## 2024-11-11 PROCEDURE — 76830 TRANSVAGINAL US NON-OB: CPT

## 2024-11-11 PROCEDURE — 76856 US EXAM PELVIC COMPLETE: CPT

## 2024-11-18 DIAGNOSIS — E87.6 HYPOKALEMIA: ICD-10-CM

## 2024-11-18 RX ORDER — POTASSIUM CHLORIDE 1500 MG/1
20 TABLET, EXTENDED RELEASE ORAL DAILY
Qty: 90 TABLET | Refills: 3 | Status: SHIPPED | OUTPATIENT
Start: 2024-11-18

## 2024-11-19 ENCOUNTER — TELEMEDICINE (OUTPATIENT)
Dept: UROLOGY | Facility: CLINIC | Age: 54
End: 2024-11-19
Payer: COMMERCIAL

## 2024-11-19 VITALS
SYSTOLIC BLOOD PRESSURE: 128 MMHG | BODY MASS INDEX: 31.98 KG/M2 | TEMPERATURE: 98.4 F | DIASTOLIC BLOOD PRESSURE: 84 MMHG | HEIGHT: 66 IN | WEIGHT: 199 LBS | HEART RATE: 87 BPM | OXYGEN SATURATION: 96 %

## 2024-11-19 DIAGNOSIS — N83.202 CYST OF LEFT OVARY: ICD-10-CM

## 2024-11-19 DIAGNOSIS — N20.0 NEPHROLITHIASIS: Primary | ICD-10-CM

## 2024-11-19 PROCEDURE — 99213 OFFICE O/P EST LOW 20 MIN: CPT

## 2024-11-19 NOTE — PROGRESS NOTES
Virtual Regular Visit  Name: Tommy Busch      : 1970      MRN: [id number]  Encounter Provider: GWEN Youssef  Encounter Date: 2024   Encounter department: [facility]      Verification of patient location:  Patient is located at Home in the following state in which I hold an active license [place] :  Assessment & Plan  Nephrolithiasis  Recurrent nephrolithiasis s/p Left URS with LL in 2023  Surveillance US shows nonobstructing bilateral renal calculi.  It appears new stone formation on the right with the largest stone measuring 9 mm.  Left lower pole stone currently measuring 7 mm previously measuring 2 mm.  She is otherwise asymptomatic.  I recommend continued surveillance but would like to check a ultrasound and KUB in 6 months.  I have also provided her a referral to nephrology for additional counseling.  She has over the past year increased her water intake to at least 64 ounces per day and has been eliminating tea.  She still drinks about 1 cup of coffee.  Orders:    Ambulatory Referral to Nephrology; Future    Cyst of left ovary  Incidentally noted on Renal US. Follow-up Pelvic US shows 3.1 cm mildly complex left ovarian cyst, no internal vascularity, likely an endometrioma or hemorrhagic cyst.   She has follow-up with OB/GYN in January           Encounter provider GWEN Youssef    The patient was identified by name and date of birth. Tommy Busch was informed that this is a telemedicine visit and that the visit is being conducted through the [employer] Teams platform. She agrees to proceed..  My office door was closed. No one else was in the room.  She acknowledged consent and understanding of privacy and security of the video platform. The patient has agreed to participate and understands they can discontinue the visit at any time.    Patient is aware this is a billable service. I attempted to connect with patient via Haiku tammi but I was not able  to hear patients but was able to see her. She was able to hear and see me. We both disconnected and reconnected with same problems with audio. I was able to contact her via telephone to conduct her visit.         History of Present Illness     Established patient last seen by me in November 2023 with history of nephrolithiasis status post left ureteroscopy with laser lithotripsy on 8/15/2023. This was performed after there was concern for possible retained ureteral stent versus new developing staghorn calculi following her prior lithotripsy on 5/1/2023. Intraoperative findings showed no evidence of retained ureteral stent. She did have small clots or stone in the lower pole which were fragmented. CT findings secondary to calcified cyst.     Last visit she did complain of persistent dull left flank pain following ureteroscopy.  Ultrasound imaging showed a 2 mm shadowing left renal calculi.  I suspected this is possibly calcified cyst.  Pain was exacerbated with movement and activity as well as spicy or acidic foods. I suppose she could possibly be experiencing reflux however imaging has been negative for hydronephrosis and she has no issues with recurrent UTIs. Recommend conservative treatment with making sure to empty the bladder and to avoid postpone urination, drinking plenty of water, and using heat and ice as needed.     Surveillance renal ultrasound on 10/22/2024 showed bilateral nonobstructing stones without hydronephrosis.  Largest stone measuring 9 mm on the right and 7 mm on the left.  Incidental finding of a left adnexal cyst measuring up to 3.8 cm.  Follow-up pelvic ultrasound completed on 11/11/2024 showed 1 cm mildly complex left ovarian cyst, no internal vascularity, likely an endometrioma or hemorrhagic cyst. Recommend follow-up ultrasound in 2-3 months.       HPI  Review of Systems   Constitutional:  Negative for chills and fever.   HENT:  Negative for ear pain and sore throat.    Eyes:  Negative for  "pain and visual disturbance.   Respiratory:  Negative for cough and shortness of breath.    Cardiovascular:  Negative for chest pain and palpitations.   Gastrointestinal:  Negative for abdominal pain and vomiting.   Genitourinary:  Negative for dysuria and hematuria.   Musculoskeletal:  Negative for arthralgias and back pain.   Skin:  Negative for color change and rash.   Neurological:  Negative for seizures and syncope.   All other systems reviewed and are negative.      Objective   /84   Pulse 87   Temp 98.4 °F (36.9 °C)   Ht 5' 6\" (1.676 m)   Wt 90.3 kg (199 lb)   SpO2 96%   BMI 32.12 kg/m²     Physical Exam  Constitutional:       Appearance: Normal appearance.   HENT:      Head: Normocephalic and atraumatic.   Eyes:      Conjunctiva/sclera: Conjunctivae normal.      Pupils: Pupils are equal, round, and reactive to light.   Neurological:      Mental Status: She is alert.           [id number]    PELVIC ULTRASOUND, COMPLETE     INDICATION: The patient is 53 years old. N94.9: Unspecified condition associated with female genital organs and menstrual cycle.     COMPARISON: None     TECHNIQUE: Transabdominal pelvic ultrasound was performed in sagittal and transverse planes with a curvilinear transducer. Additional transvaginal imaging was performed to better evaluate the endometrium and ovaries. Imaging included volumetric sweeps as   well as traditional still imaging technique.     FINDINGS:     UTERUS:  The uterus is anteverted in position, measuring 5.9 x 2.7 x 3.7 cm.  Uterine contour remains within normal limits. Myometrial cysts.  The cervix appears within normal limits.     ENDOMETRIUM:  Heterogeneous myometrium with indistinct endometrial stripe. The visualized portions of the endometrium are not abnormally thickened.     OVARIES/ADNEXA:  Right ovary: Not visualized.     Left ovary: 3.7 x 3.4 x 2.7 cm. 17.7 mL.  Ovarian Doppler flow is within normal limits.  3.0 x 2.5 x 3.1 cm hypoechoic " mildly complex cyst with low-level internal reticular echoes. No internal vascularity.     OTHER:  No free fluid or loculated fluid collections.     IMPRESSION:     Probable uterine adenomyosis.     3.1 cm mildly complex left ovarian cyst, no internal vascularity, likely an endometrioma or hemorrhagic cyst. Recommend follow-up ultrasound in 2-3 months.     The right ovary is not visualized.                  10/22/2024    RENAL ULTRASOUND     INDICATION: N20.1: Calculus of ureter.     COMPARISON: Renal ultrasound 11/13/2023     TECHNIQUE: Ultrasound of the retroperitoneum was performed with a curvilinear transducer utilizing volumetric sweeps and still imaging techniques.     FINDINGS:     KIDNEYS:  Symmetric and normal size.  Right kidney: 10.1 x 4.6 x 4.7 cm. Volume 114.8 mL  Left kidney: 9.5 x 5.4 x 5.1 cm. Volume 136.7 mL     Right kidney  Normal echogenicity and contour.  No mass is identified.  No hydronephrosis.  A couple stones measuring up to 9 mm.  No perinephric fluid collections.     Left kidney  Normal echogenicity and contour.  No mass is identified.  No hydronephrosis.  A 7 mm stone is seen in the lower pole.  No perinephric fluid collections.     URETERS:  Nonvisualized.     BLADDER:  Normally distended.  No focal thickening or mass lesions.  Bilateral ureteral jets detected.     Incidental note of a left adnexal cyst measuring 3.8 x 2.9 x 3.1 cm.     IMPRESSION:     1. Bilateral nephrolithiasis. No hydronephrosis.     2. Incidental note of a left adnexal cyst measuring up to 3.8 cm. Consider further evaluation with a dedicated pelvic ultrasound.      Visit Time  Total Visit Duration: 20 minutes was spent over the phone with the patient today.       GWEN Youssef

## 2024-11-26 ENCOUNTER — OFFICE VISIT (OUTPATIENT)
Dept: INTERNAL MEDICINE CLINIC | Facility: CLINIC | Age: 54
End: 2024-11-26
Payer: COMMERCIAL

## 2024-11-26 VITALS
WEIGHT: 201.1 LBS | DIASTOLIC BLOOD PRESSURE: 80 MMHG | TEMPERATURE: 98 F | BODY MASS INDEX: 32.32 KG/M2 | HEART RATE: 83 BPM | OXYGEN SATURATION: 97 % | SYSTOLIC BLOOD PRESSURE: 118 MMHG | HEIGHT: 66 IN

## 2024-11-26 DIAGNOSIS — E55.9 VITAMIN D DEFICIENCY: ICD-10-CM

## 2024-11-26 DIAGNOSIS — G47.00 INSOMNIA, UNSPECIFIED TYPE: ICD-10-CM

## 2024-11-26 DIAGNOSIS — I10 PRIMARY HYPERTENSION: ICD-10-CM

## 2024-11-26 DIAGNOSIS — Z23 ENCOUNTER FOR IMMUNIZATION: ICD-10-CM

## 2024-11-26 DIAGNOSIS — E78.2 MIXED HYPERLIPIDEMIA: ICD-10-CM

## 2024-11-26 DIAGNOSIS — F41.9 ANXIETY: ICD-10-CM

## 2024-11-26 DIAGNOSIS — F33.1 MODERATE EPISODE OF RECURRENT MAJOR DEPRESSIVE DISORDER (HCC): Primary | ICD-10-CM

## 2024-11-26 DIAGNOSIS — E66.9 OBESITY (BMI 30-39.9): ICD-10-CM

## 2024-11-26 LAB — SL AMB POCT HEMOGLOBIN AIC: 5.1 (ref ?–6.5)

## 2024-11-26 PROCEDURE — 90471 IMMUNIZATION ADMIN: CPT | Performed by: FAMILY MEDICINE

## 2024-11-26 PROCEDURE — 83036 HEMOGLOBIN GLYCOSYLATED A1C: CPT | Performed by: FAMILY MEDICINE

## 2024-11-26 PROCEDURE — 90673 RIV3 VACCINE NO PRESERV IM: CPT | Performed by: FAMILY MEDICINE

## 2024-11-26 PROCEDURE — 99214 OFFICE O/P EST MOD 30 MIN: CPT | Performed by: FAMILY MEDICINE

## 2024-11-26 RX ORDER — ESCITALOPRAM OXALATE 10 MG/1
10 TABLET ORAL DAILY
Qty: 30 TABLET | Refills: 4 | Status: SHIPPED | OUTPATIENT
Start: 2024-11-26

## 2024-11-27 NOTE — ASSESSMENT & PLAN NOTE
Sleep is a persistent issue.  Discussed sleep study but she does not think she would tolerate the CPAP.  Will discontinue the Wellbutrin since this may be worsening the sleep issues.  Continue with the Ambien on an as-needed basis.  Orders:    CBC and differential; Future

## 2024-11-27 NOTE — ASSESSMENT & PLAN NOTE
Continue with the atorvastatin.  Watch diet.  Try to increase activity level.  Increase fiber in diet.  Orders:    CBC and differential; Future    Comprehensive metabolic panel; Future    Lipid panel; Future    TSH, 3rd generation; Future

## 2024-11-27 NOTE — ASSESSMENT & PLAN NOTE
Continue with vitamin D supplementation.  Will continue to follow vitamin D level with routine laboratory testing.  Orders:    CBC and differential; Future    Vitamin D 25 hydroxy; Future

## 2024-11-27 NOTE — PROGRESS NOTES
Name: Tommy Busch      : 1970      MRN: [id number]  Encounter Provider: Rylie Dyer MD  Encounter Date: 2024   Encounter department: [facility]  :  Assessment & Plan  Encounter for immunization    Orders:    influenza vaccine, recombinant, PF, 0.5 mL IM (Flublok)    Obesity (BMI 30-39.9)      Orders:    POCT hemoglobin A1c    Moderate episode of recurrent major depressive disorder (HCC)  Depression and anxiety symptoms worsening.  These are significantly affecting her life at present.  Consider counseling.  Grieving process discussed.  Will increase the dose of the Lexapro.  Will discontinue the Wellbutrin since this may be worsening some of her anxiety symptoms.  Relaxation techniques discussed.  [place] paperwork will [place].    Orders:    escitalopram (LEXAPRO) 10 mg tablet; Take 1 tablet (10 mg total) by mouth daily    CBC and differential; Future    Anxiety  Anxiety symptoms discussed.  Take the Xanax on an as needed basis.  Will increase the dose of the Lexapro and discontinue the Wellbutrin since this may be worsening some of her anxiety and sleep issues.  Orders:    escitalopram (LEXAPRO) 10 mg tablet; Take 1 tablet (10 mg total) by mouth daily    CBC and differential; Future    Insomnia, unspecified type  Sleep is a persistent issue.  Discussed sleep study but she does not think she would tolerate the CPAP.  Will discontinue the Wellbutrin since this may be worsening the sleep issues.  Continue with the Ambien on an as-needed basis.  Orders:    CBC and differential; Future    Primary hypertension  Blood pressure controlled.  Continue with the verapamil.  Watch salt intake.  Stay adequately hydrated.  Orders:    CBC and differential; Future    Comprehensive metabolic panel; Future    Mixed hyperlipidemia  Continue with the atorvastatin.  Watch diet.  Try to increase activity level.  Increase fiber in diet.  Orders:    CBC and differential; Future     Comprehensive metabolic panel; Future    Lipid panel; Future    TSH, 3rd generation; Future    Vitamin D deficiency  Continue with vitamin D supplementation.  Will continue to follow vitamin D level with routine laboratory testing.  Orders:    CBC and differential; Future    Vitamin D 25 hydroxy; Future    Orders and recommendations as noted above.  Flu shot given today.  [facility] paperwork will be completed.  Will have her follow-up in about 2 to 3 months or sooner if needed.       History of Present Illness     She presents for routine follow-up.  Has been feeling about the same if not somewhat worse.  Mood continues to be a big issue.  She misses her mom greatly.  She cries often.  Has difficulty concentrating.  Has difficulty completing tasks.  Having a lot of difficulty at work because of this.  Has significant difficulty sleeping.  Cannot stop her mind from thinking.  Unsure if the medication is working for her mood.  Denies any suicidal or homicidal ideation.  Does have the support of her family.  Tolerating her atorvastatin without difficulty.  Denies any significant muscle aches or weakness.  Continues with the verapamil.  Denies any side effects.  Appetite has been variable.  No recent urinary symptoms.  Elbow pain has improved.  Still with significant fatigue.      Review of Systems   Constitutional:  Positive for activity change, appetite change and fatigue. Negative for chills and fever.   HENT:  Negative for congestion and rhinorrhea.    Eyes:  Negative for visual disturbance.   Respiratory:  Negative for chest tightness and shortness of breath.    Cardiovascular:  Negative for chest pain and palpitations.   Gastrointestinal:  Negative for abdominal pain, blood in stool, diarrhea, nausea and vomiting.   Endocrine: Negative for polydipsia, polyphagia and polyuria.   Genitourinary:  Negative for dysuria, frequency and urgency.   Musculoskeletal:  Negative for gait problem.   Skin:  Negative for color change.    Neurological:  Positive for headaches. Negative for dizziness.   Hematological:  Does not bruise/bleed easily.   Psychiatric/Behavioral:  Positive for decreased concentration, dysphoric mood and sleep disturbance. Negative for confusion. The patient is nervous/anxious.      Medical History Reviewed by provider this encounter:     .  Past Medical History   Past Medical History:   Diagnosis Date    Allergic     Anxiety     Asthma     seasonal allergy induced    Bruxism (teeth grinding)     Claustrophobia     Endometriosis     Esophageal reflux     GERD (gastroesophageal reflux disease)     Headache(784.0)     High cholesterol     Hypertension     Kidney stone     Migraines     Plantar fasciitis     Seasonal allergies     Shingles 2017    Sleep apnea     can't use CPAP    Swelling of both lower extremities     Wears contact lenses     Wears glasses      Past Surgical History:   Procedure Laterality Date    BONE GRAFT      L jaw for future false tooth implant     SECTION      FL RETROGRADE PYELOGRAM  2023    FL RETROGRADE PYELOGRAM  2023    FOOT SURGERY Left     LAPAROSCOPY      of uterus    DC CYSTO/URETERO W/LITHOTRIPSY &INDWELL STENT INSRT Left 2023    Procedure: CYSTOSCOPY  RETROGRADE PYELOGRAM AND INSERTION STENT URETERAL;  Surgeon: Nick Gallagher MD;  Location:  MAIN OR;  Service: Urology    DC CYSTO/URETERO W/LITHOTRIPSY &INDWELL STENT INSRT Left 2023    Procedure: CYSTO USCOPE W/  LASER, &STENT;  Surgeon: Lee Holliday MD;  Location: [facility];  Service: Urology    DC CYSTO/URETERO W/LITHOTRIPSY &INDWELL STENT INSRT Left 8/15/2023    Procedure: CYSTOSCOPY URETEROSCOPY WITH LITHOTRIPSY HOLMIUM LASER, RETROGRADE PYELOGRAM AND INSERTION STENT URETERAL;  Surgeon: Ruben Donahue MD;  Location:  MAIN OR;  Service: Urology    TOOTH EXTRACTION      3 besides wisdom teeth    WISDOM TOOTH EXTRACTION      2 of teeth     Family History   Adopted: Yes   Problem Relation  Age of Onset    Heart murmur Mother     Depression Mother     Other Mother         C-spine injury    Pancreatic cancer Mother 73        2023 diagnosed    Colon cancer Mother         Pancreatic cancer/    Hypertension Father             Abdominal aortic aneurysm Father     Raynaud syndrome Sister     Allergies Sister     Hypothyroidism Sister     Raynaud syndrome Sister     Bipolar disorder Sister     Allergies Sister     Allergies Daughter         Environmental    Allergies Daughter         Environmental    Colon cancer Maternal Grandmother         Colorectal cancer/    No Known Problems Maternal Grandfather     No Known Problems Paternal Grandmother     No Known Problems Paternal Grandfather     Asperger's syndrome Brother     Allergies Brother     Cancer Maternal Uncle     Asperger's syndrome Family         disorder    Diabetes Family         DM    Bipolar disorder Family     Depression Family     Hypertension Family     Raynaud syndrome Family         phenomenon      reports that she has never smoked. She has never used smokeless tobacco. She reports that she does not currently use alcohol. She reports current drug use. Drug: Marijuana.  Current Outpatient Medications on File Prior to Visit   Medication Sig Dispense Refill    albuterol (PROVENTIL HFA,VENTOLIN HFA) 90 mcg/act inhaler Inhale 2 puffs every 6 (six) hours as needed for wheezing      ALPRAZolam (XANAX) 0.5 mg tablet Take 1 tablet (0.5 mg total) by mouth daily at bedtime as needed for anxiety 30 tablet 0    atorvastatin (LIPITOR) 20 mg tablet take 1 tablet by mouth once daily 90 tablet 3    Cholecalciferol (VITAMIN D3 PO) Take by mouth      fluticasone (FLONASE) 50 mcg/act nasal spray 2 sprays into each nostril daily 16 g 11    ibuprofen (MOTRIN) 200 mg tablet Take by mouth every 6 (six) hours as needed for mild pain      NON FORMULARY Take 25 mg by mouth daily at bedtime THC gummies for sleep      omeprazole (PriLOSEC) 20  mg delayed release capsule Take 20 mg by mouth daily as needed       potassium chloride (Klor-Con M20) 20 mEq tablet take 1 tablet by mouth once daily 90 tablet 3    Rimegepant Sulfate (Nurtec) 75 MG TBDP Take 75 mg by mouth every other day 16 tablet 11    verapamil (CALAN-SR) 240 mg CR tablet take 1 tablet by mouth once daily at bedtime 90 tablet 1    zolpidem (AMBIEN) 5 mg tablet take 1 tablet by mouth at bedtime if needed for sleep 30 tablet 1    acetaminophen (TYLENOL) 325 mg tablet Take 2 tablets (650 mg total) by mouth every 6 (six) hours as needed for mild pain, fever or headaches 30 tablet 0     No current facility-administered medications on file prior to visit.     Allergies   Allergen Reactions    Gabapentin Swelling     In lymph node      Current Outpatient Medications on File Prior to Visit   Medication Sig Dispense Refill    albuterol (PROVENTIL HFA,VENTOLIN HFA) 90 mcg/act inhaler Inhale 2 puffs every 6 (six) hours as needed for wheezing      ALPRAZolam (XANAX) 0.5 mg tablet Take 1 tablet (0.5 mg total) by mouth daily at bedtime as needed for anxiety 30 tablet 0    atorvastatin (LIPITOR) 20 mg tablet take 1 tablet by mouth once daily 90 tablet 3    Cholecalciferol (VITAMIN D3 PO) Take by mouth      fluticasone (FLONASE) 50 mcg/act nasal spray 2 sprays into each nostril daily 16 g 11    ibuprofen (MOTRIN) 200 mg tablet Take by mouth every 6 (six) hours as needed for mild pain      NON FORMULARY Take 25 mg by mouth daily at bedtime THC gummies for sleep      omeprazole (PriLOSEC) 20 mg delayed release capsule Take 20 mg by mouth daily as needed       potassium chloride (Klor-Con M20) 20 mEq tablet take 1 tablet by mouth once daily 90 tablet 3    Rimegepant Sulfate (Nurtec) 75 MG TBDP Take 75 mg by mouth every other day 16 tablet 11    verapamil (CALAN-SR) 240 mg CR tablet take 1 tablet by mouth once daily at bedtime 90 tablet 1    zolpidem (AMBIEN) 5 mg tablet take 1 tablet by mouth at bedtime if needed  "for sleep 30 tablet 1    acetaminophen (TYLENOL) 325 mg tablet Take 2 tablets (650 mg total) by mouth every 6 (six) hours as needed for mild pain, fever or headaches 30 tablet 0     No current facility-administered medications on file prior to visit.      Social History     Tobacco Use    Smoking status: Never    Smokeless tobacco: Never   Vaping Use    Vaping status: Never Used   Substance and Sexual Activity    Alcohol use: Not Currently     Comment: Consume 3-10  drinks/ year    Drug use: Yes     Types: Marijuana     Comment: thc gummies    Sexual activity: Yes     Partners: Male     Birth control/protection: None        Objective   /80 (BP Location: Left arm, Patient Position: Sitting, Cuff Size: Large)   Pulse 83   Temp 98 °F (36.7 °C)   Ht 5' 6\" (1.676 m)   Wt 91.2 kg (201 lb 1.6 oz)   SpO2 97%   BMI 32.46 kg/m²      Physical Exam  Vitals and nursing note reviewed.   Constitutional:       General: She is not in acute distress.     Appearance: She is well-developed, well-groomed and overweight.   HENT:      Head: Normocephalic and atraumatic.   Eyes:      General:         Right eye: No discharge.         Left eye: No discharge.      Conjunctiva/sclera: Conjunctivae normal.      Pupils: Pupils are equal, round, and reactive to light.   Cardiovascular:      Rate and Rhythm: Normal rate and regular rhythm.      Heart sounds: Normal heart sounds. No murmur heard.     No friction rub. No gallop.   Pulmonary:      Effort: No respiratory distress.      Breath sounds: No wheezing or rales.   Abdominal:      General: Bowel sounds are normal. There is no distension.      Tenderness: There is no abdominal tenderness.   Lymphadenopathy:      Cervical: No cervical adenopathy.   Skin:     General: Skin is warm and dry.   Neurological:      Mental Status: She is alert and oriented to person, place, and time.   Psychiatric:         Mood and Affect: Mood is depressed. Affect is tearful.         Speech: Speech " normal.         Behavior: Behavior normal. Behavior is cooperative.         Thought Content: Thought content does not include homicidal or suicidal ideation.         Cognition and Memory: Cognition and memory normal.

## 2024-11-27 NOTE — ASSESSMENT & PLAN NOTE
Anxiety symptoms discussed.  Take the Xanax on an as needed basis.  Will increase the dose of the Lexapro and discontinue the Wellbutrin since this may be worsening some of her anxiety and sleep issues.  Orders:    escitalopram (LEXAPRO) 10 mg tablet; Take 1 tablet (10 mg total) by mouth daily    CBC and differential; Future

## 2024-11-27 NOTE — ASSESSMENT & PLAN NOTE
Depression and anxiety symptoms worsening.  These are significantly affecting her life at present.  Consider counseling.  Grieving process discussed.  Will increase the dose of the Lexapro.  Will discontinue the Wellbutrin since this may be worsening some of her anxiety symptoms.  Relaxation techniques discussed.  [place] paperwork will be [place].    Orders:    escitalopram (LEXAPRO) 10 mg tablet; Take 1 tablet (10 mg total) by mouth daily    CBC and differential; Future

## 2024-11-27 NOTE — ASSESSMENT & PLAN NOTE
Blood pressure controlled.  Continue with the verapamil.  Watch salt intake.  Stay adequately hydrated.  Orders:    CBC and differential; Future    Comprehensive metabolic panel; Future

## 2024-12-13 ENCOUNTER — TELEPHONE (OUTPATIENT)
Age: 54
End: 2024-12-13

## 2024-12-13 NOTE — TELEPHONE ENCOUNTER
Pt called in following up on her FMLA forms she provided to Dr. Dyer at her last appt on 11/26.    Pt states employer is only giving her a few more days to provide her FMLA paperwork to HR, if she does not bring it in, pt will be facing disciplinary action.     Secondly, pt mentioned she attached 2 screenshots from [employer], stating that the form that was received by fax has incorrect dates listed. To please correct the dates and refax.     Please advise & call pt back with update on forms as this is time sensitive. Thank you!    Tommy Busch N: [phone]

## 2024-12-30 NOTE — TELEPHONE ENCOUNTER
Can the forms be emailed? Patient will try uploading them to the portal [employer]even [employer] working. Please email to:    [email or web address]      Thank You!

## 2024-12-31 NOTE — TELEPHONE ENCOUNTER
Pt called back today stating she did not receive the [facility] forms yesterday through email. States this needs to be taken care of ASAP.    Warm transfer to office clerical for further assistance.

## 2025-01-09 ENCOUNTER — OFFICE VISIT (OUTPATIENT)
Dept: OBGYN CLINIC | Facility: MEDICAL CENTER | Age: 55
End: 2025-01-09
Payer: COMMERCIAL

## 2025-01-09 VITALS
BODY MASS INDEX: 32.3 KG/M2 | HEIGHT: 66 IN | SYSTOLIC BLOOD PRESSURE: 120 MMHG | DIASTOLIC BLOOD PRESSURE: 80 MMHG | WEIGHT: 201 LBS

## 2025-01-09 DIAGNOSIS — Z12.4 PAP SMEAR FOR CERVICAL CANCER SCREENING: ICD-10-CM

## 2025-01-09 DIAGNOSIS — Z01.419 ENCOUNTER FOR GYNECOLOGICAL EXAMINATION: Primary | ICD-10-CM

## 2025-01-09 PROCEDURE — G0145 SCR C/V CYTO,THINLAYER,RESCR: HCPCS | Performed by: OBSTETRICS & GYNECOLOGY

## 2025-01-09 PROCEDURE — G0476 HPV COMBO ASSAY CA SCREEN: HCPCS | Performed by: OBSTETRICS & GYNECOLOGY

## 2025-01-09 PROCEDURE — 99386 PREV VISIT NEW AGE 40-64: CPT | Performed by: OBSTETRICS & GYNECOLOGY

## 2025-01-09 NOTE — PROGRESS NOTES
"[facility] [address], [place], [place]    ASSESSMENT/PLAN: Tommy Busch is a 54 y.o.  who presents for annual gynecologic exam.    Encounter for routine gynecologic examination  - Routine well woman exam completed today.  - Cervical Cancer Screening: Current ASCCP Guidelines reviewed. Last Pap: 2017 . Next Pap Due: today  - Breast Cancer Screening: Last Mammogram 2023, mammo ordered  - Colorectal cancer screening was not ordered.  - The following were reviewed in today's visit: breast self exam and mammography screening ordered    Additional problems addressed at this visit:  1. Pap smear for cervical cancer screening  2. Encounter for gynecological examination  -     Ambulatory Referral to Obstetrics / Gynecology        CC:  Annual Gynecologic Examination    HPI: Tommy Busch is a 54 y.o.  who presents for annual gynecologic examination.  HPI  Patient presents for routine annual exam, doing well. No GYN complaints    The following portions of the patient's history were reviewed and updated as appropriate: allergies, current medications, past family history, past medical history, obstetric history, gynecologic history, past social history, past surgical history and problem list.    Review of Systems   Constitutional: Negative.    HENT: Negative.     Eyes: Negative.    Respiratory: Negative.     Cardiovascular: Negative.    Gastrointestinal: Negative.    Genitourinary: Negative.    Musculoskeletal: Negative.    All other systems reviewed and are negative.        Objective:  /80 (BP Location: Right arm, Patient Position: Sitting, Cuff Size: Standard)   Ht 5' 6\" (1.676 m)   Wt 91.2 kg (201 lb)   LMP 2018   BMI 32.44 kg/m²    Physical Exam  Vitals reviewed.   Constitutional:       General: She is not in acute distress.     Appearance: She is well-developed.   HENT:      Head: Normocephalic and atraumatic.      Nose: Nose normal. "   Cardiovascular:      Rate and Rhythm: Normal rate.   Pulmonary:      Effort: Pulmonary effort is normal. No respiratory distress.   Chest:   Breasts:     Breasts are symmetrical.      Right: Normal. No mass, nipple discharge, skin change or tenderness.      Left: Normal. No mass, nipple discharge, skin change or tenderness.   Abdominal:      General: There is no distension.      Palpations: Abdomen is soft. There is no mass.      Tenderness: There is no abdominal tenderness. There is no guarding or rebound.   Genitourinary:     General: Normal vulva.      Exam position: Lithotomy position.      Labia:         Right: No lesion.         Left: No lesion.       Urethra: No prolapse (urethral meatus normal).      Vagina: Normal. No vaginal discharge, erythema or bleeding.      Cervix: Normal.      Uterus: Normal.       Adnexa: Right adnexa normal and left adnexa normal.   Musculoskeletal:         General: Normal range of motion.      Cervical back: Normal range of motion.   Lymphadenopathy:      Upper Body:      Right upper body: No supraclavicular, axillary or pectoral adenopathy.      Left upper body: No supraclavicular, axillary or pectoral adenopathy.      Lower Body: No right inguinal adenopathy. No left inguinal adenopathy.   Skin:     General: Skin is warm and dry.   Neurological:      Mental Status: She is alert and oriented to person, place, and time.   Psychiatric:         Behavior: Behavior normal.         Thought Content: Thought content normal.         Judgment: Judgment normal.             Chandrika Fishman MD  [facility]  01/09/25 4:01 PM

## 2025-01-11 LAB
HPV HR 12 DNA CVX QL NAA+PROBE: NEGATIVE
HPV16 DNA CVX QL NAA+PROBE: NEGATIVE
HPV18 DNA CVX QL NAA+PROBE: NEGATIVE

## 2025-01-13 ENCOUNTER — OFFICE VISIT (OUTPATIENT)
Dept: INTERNAL MEDICINE CLINIC | Facility: CLINIC | Age: 55
End: 2025-01-13
Payer: COMMERCIAL

## 2025-01-13 VITALS
OXYGEN SATURATION: 98 % | BODY MASS INDEX: 32.38 KG/M2 | TEMPERATURE: 97.9 F | WEIGHT: 201.5 LBS | HEART RATE: 71 BPM | HEIGHT: 66 IN | SYSTOLIC BLOOD PRESSURE: 122 MMHG | DIASTOLIC BLOOD PRESSURE: 80 MMHG

## 2025-01-13 DIAGNOSIS — F33.1 MODERATE EPISODE OF RECURRENT MAJOR DEPRESSIVE DISORDER (HCC): ICD-10-CM

## 2025-01-13 DIAGNOSIS — J32.9 CHRONIC SINUSITIS, UNSPECIFIED LOCATION: ICD-10-CM

## 2025-01-13 DIAGNOSIS — G47.00 INSOMNIA, UNSPECIFIED TYPE: ICD-10-CM

## 2025-01-13 DIAGNOSIS — E78.2 MIXED HYPERLIPIDEMIA: ICD-10-CM

## 2025-01-13 DIAGNOSIS — I10 PRIMARY HYPERTENSION: Primary | ICD-10-CM

## 2025-01-13 DIAGNOSIS — E55.9 VITAMIN D DEFICIENCY: ICD-10-CM

## 2025-01-13 DIAGNOSIS — F41.9 ANXIETY: ICD-10-CM

## 2025-01-13 PROCEDURE — 99214 OFFICE O/P EST MOD 30 MIN: CPT | Performed by: FAMILY MEDICINE

## 2025-01-13 RX ORDER — ESCITALOPRAM OXALATE 20 MG/1
20 TABLET ORAL DAILY
Qty: 90 TABLET | Refills: 1 | Status: SHIPPED | OUTPATIENT
Start: 2025-01-13

## 2025-01-14 NOTE — ASSESSMENT & PLAN NOTE
Continue with vitamin D supplementation.  Will continue to follow vitamin D level with routine laboratory testing and adjust dose if needed.  Orders:  •  CBC and differential; Future  •  Vitamin D 25 hydroxy; Future

## 2025-01-14 NOTE — ASSESSMENT & PLAN NOTE
Chronic sinus symptoms with recent exacerbation over the last few weeks.  Will start her on the Augmentin as noted.  Fluids.  Rest.  Call or follow-up if symptoms worsen or persist.  Orders:  •  amoxicillin-clavulanate (AUGMENTIN) 875-125 mg per tablet; Take 1 tablet by mouth 2 (two) times a day for 10 days  •  CBC and differential; Future

## 2025-01-14 NOTE — ASSESSMENT & PLAN NOTE
Increased dose of the Lexapro as noted.  Methods for stress relief discussed.  Orders:  •  escitalopram (LEXAPRO) 20 mg tablet; Take 1 tablet (20 mg total) by mouth daily  •  CBC and differential; Future

## 2025-01-14 NOTE — ASSESSMENT & PLAN NOTE
Continue with the atorvastatin.  Watch diet.  Increase fiber in diet.  Try to remain as active as possible.  Orders:  •  CBC and differential; Future  •  Comprehensive metabolic panel; Future  •  Lipid panel; Future  •  TSH, 3rd generation; Future

## 2025-01-14 NOTE — ASSESSMENT & PLAN NOTE
Blood pressure currently well-controlled.  Continue current medications.  Watch salt intake.  Stay adequately hydrated.  Orders:  •  CBC and differential; Future  •  Comprehensive metabolic panel; Future

## 2025-01-14 NOTE — ASSESSMENT & PLAN NOTE
Chronic sleep issues discussed.  Some of this is likely related to the anxiety and depression.  Hopefully the increased dose of the Lexapro will help.  Orders:  •  CBC and differential; Future

## 2025-01-14 NOTE — PROGRESS NOTES
Name: Tommy Busch      : 1970      MRN: [id number]  Encounter Provider: Rylie Dyer MD  Encounter Date: 2025   Encounter department: [facility]  :  Assessment & Plan  Moderate episode of recurrent major depressive disorder (HCC)    Orders and recommendations as noted.  Will increase the dose of the Lexapro to help with her anxiety and depression symptoms.  Watch for any worsening.  Consider counseling.  Orders:  •  escitalopram (LEXAPRO) 20 mg tablet; Take 1 tablet (20 mg total) by mouth daily  •  CBC and differential; Future    Anxiety  Increased dose of the Lexapro as noted.  Methods for stress relief discussed.  Orders:  •  escitalopram (LEXAPRO) 20 mg tablet; Take 1 tablet (20 mg total) by mouth daily  •  CBC and differential; Future    Primary hypertension  Blood pressure currently well-controlled.  Continue current medications.  Watch salt intake.  Stay adequately hydrated.  Orders:  •  CBC and differential; Future  •  Comprehensive metabolic panel; Future    Chronic sinusitis, unspecified location  Chronic sinus symptoms with recent exacerbation over the last few weeks.  Will start her on the Augmentin as noted.  Fluids.  Rest.  Call or follow-up if symptoms worsen or persist.  Orders:  •  amoxicillin-clavulanate (AUGMENTIN) 875-125 mg per tablet; Take 1 tablet by mouth 2 (two) times a day for 10 days  •  CBC and differential; Future    Insomnia, unspecified type  Chronic sleep issues discussed.  Some of this is likely related to the anxiety and depression.  Hopefully the increased dose of the Lexapro will help.  Orders:  •  CBC and differential; Future    Mixed hyperlipidemia  Continue with the atorvastatin.  Watch diet.  Increase fiber in diet.  Try to remain as active as possible.  Orders:  •  CBC and differential; Future  •  Comprehensive metabolic panel; Future  •  Lipid panel; Future  •  TSH, 3rd generation; Future    Vitamin D deficiency  Continue  with vitamin D supplementation.  Will continue to follow vitamin D level with routine laboratory testing and adjust dose if needed.  Orders:  •  CBC and differential; Future  •  Vitamin D 25 hydroxy; Future           History of Present Illness     She presents for routine follow-up.  Has generally been doing a little better.  Feels somewhat [place] on the increased dose of Lexapro.  Still feels significantly down at times.  Still with some anxiety symptoms.  She still feels achy all over and has daily headaches.  She feels some of the headaches at present are related to her sinuses.  Has increased nasal congestion and postnasal drip with cough over the last few weeks.  Worsening over the last few days.  Tolerating her blood pressure medication without difficulty.  Denies any localized weakness.  Tolerating the atorvastatin well.  Denies any significant muscle aches or weakness with this.  Still with difficulty sleeping.  Feels tired all the time.  Has taken the Ambien but has to be careful when she takes this to avoid being groggy in the morning.  Also takes well over an hour for this to help her with sleep.      Review of Systems   Constitutional:  Positive for activity change and fatigue. Negative for appetite change, chills and fever.   HENT:  Positive for congestion, postnasal drip and sore throat. Negative for rhinorrhea.    Eyes:  Negative for visual disturbance.   Respiratory:  Positive for cough. Negative for chest tightness and shortness of breath.    Cardiovascular:  Negative for chest pain and palpitations.   Gastrointestinal:  Negative for abdominal pain, blood in stool, diarrhea, nausea and vomiting.   Endocrine: Negative for polydipsia, polyphagia and polyuria.   Genitourinary:  Negative for dysuria, frequency and urgency.   Musculoskeletal:  Positive for arthralgias and myalgias. Negative for gait problem.   Skin:  Negative for color change.   Neurological:  Positive for headaches. Negative for  "dizziness.   Hematological:  Does not bruise/bleed easily.   Psychiatric/Behavioral:  Negative for confusion and sleep disturbance. The patient is nervous/anxious.        Objective   /80 (BP Location: Left arm, Patient Position: Sitting, Cuff Size: Large)   Pulse 71   Temp 97.9 °F (36.6 °C)   Ht 5' 6\" (1.676 m)   Wt 91.4 kg (201 lb 8 oz)   LMP 06/01/2018   SpO2 98%   BMI 32.52 kg/m²      Physical Exam  Vitals and nursing note reviewed.   Constitutional:       General: She is not in acute distress.     Appearance: She is well-developed, well-groomed and overweight.   HENT:      Head: Normocephalic and atraumatic.      Comments: Boggy nasal mucosa with copious purulent nasal discharge bilaterally; posterior pharyngeal erythema  Eyes:      General:         Right eye: No discharge.         Left eye: No discharge.      Conjunctiva/sclera: Conjunctivae normal.      Pupils: Pupils are equal, round, and reactive to light.   Cardiovascular:      Rate and Rhythm: Normal rate and regular rhythm.      Heart sounds: Normal heart sounds. No murmur heard.     No friction rub. No gallop.   Pulmonary:      Effort: No respiratory distress.      Breath sounds: Transmitted upper airway sounds present. No wheezing, rhonchi or rales.   Abdominal:      General: Bowel sounds are normal. There is no distension.      Tenderness: There is no abdominal tenderness.   Lymphadenopathy:      Cervical: No cervical adenopathy.   Skin:     General: Skin is warm and dry.   Neurological:      Mental Status: She is alert and oriented to person, place, and time.   Psychiatric:         Mood and Affect: Mood is anxious. Affect is tearful.         Speech: Speech normal.         Behavior: Behavior normal. Behavior is cooperative.         Cognition and Memory: Cognition and memory normal.         "

## 2025-01-14 NOTE — ASSESSMENT & PLAN NOTE
Orders and recommendations as noted.  Will increase the dose of the Lexapro to help with her anxiety and depression symptoms.  Watch for any worsening.  Consider counseling.  Orders:  •  escitalopram (LEXAPRO) 20 mg tablet; Take 1 tablet (20 mg total) by mouth daily  •  CBC and differential; Future

## 2025-01-15 LAB
LAB AP GYN PRIMARY INTERPRETATION: NORMAL
Lab: NORMAL

## 2025-01-21 ENCOUNTER — RESULTS FOLLOW-UP (OUTPATIENT)
Dept: OBGYN CLINIC | Facility: CLINIC | Age: 55
End: 2025-01-21

## 2025-02-03 DIAGNOSIS — N20.0 NEPHROLITHIASIS: Primary | ICD-10-CM

## 2025-02-04 ENCOUNTER — TELEPHONE (OUTPATIENT)
Dept: NEPHROLOGY | Facility: CLINIC | Age: 55
End: 2025-02-04

## 2025-02-04 NOTE — TELEPHONE ENCOUNTER
I called and spoke with Tommy regarding completing blood and urine studies prior to upcoming consult appt on 02/10/25

## 2025-02-08 ENCOUNTER — APPOINTMENT (OUTPATIENT)
Dept: LAB | Facility: HOSPITAL | Age: 55
End: 2025-02-08
Payer: COMMERCIAL

## 2025-02-08 DIAGNOSIS — N20.0 NEPHROLITHIASIS: ICD-10-CM

## 2025-02-08 LAB
ALBUMIN SERPL BCG-MCNC: 4.5 G/DL (ref 3.5–5)
ALP SERPL-CCNC: 85 U/L (ref 34–104)
ALT SERPL W P-5'-P-CCNC: 9 U/L (ref 7–52)
ANION GAP SERPL CALCULATED.3IONS-SCNC: 6 MMOL/L (ref 4–13)
AST SERPL W P-5'-P-CCNC: 12 U/L (ref 13–39)
BACTERIA UR QL AUTO: ABNORMAL /HPF
BASOPHILS # BLD AUTO: 0.03 THOUSANDS/ΜL (ref 0–0.1)
BASOPHILS NFR BLD AUTO: 1 % (ref 0–1)
BILIRUB SERPL-MCNC: 0.64 MG/DL (ref 0.2–1)
BILIRUB UR QL STRIP: NEGATIVE
BUN SERPL-MCNC: 11 MG/DL (ref 5–25)
CALCIUM SERPL-MCNC: 9.7 MG/DL (ref 8.4–10.2)
CHLORIDE SERPL-SCNC: 104 MMOL/L (ref 96–108)
CLARITY UR: ABNORMAL
CO2 SERPL-SCNC: 30 MMOL/L (ref 21–32)
COLOR UR: YELLOW
CREAT SERPL-MCNC: 0.85 MG/DL (ref 0.6–1.3)
CREAT UR-MCNC: 154.3 MG/DL
EOSINOPHIL # BLD AUTO: 0.18 THOUSAND/ΜL (ref 0–0.61)
EOSINOPHIL NFR BLD AUTO: 3 % (ref 0–6)
ERYTHROCYTE [DISTWIDTH] IN BLOOD BY AUTOMATED COUNT: 12.3 % (ref 11.6–15.1)
GFR SERPL CREATININE-BSD FRML MDRD: 78 ML/MIN/1.73SQ M
GLUCOSE P FAST SERPL-MCNC: 84 MG/DL (ref 65–99)
GLUCOSE UR STRIP-MCNC: NEGATIVE MG/DL
HCT VFR BLD AUTO: 44.9 % (ref 34.8–46.1)
HGB BLD-MCNC: 15.1 G/DL (ref 11.5–15.4)
HGB UR QL STRIP.AUTO: ABNORMAL
IMM GRANULOCYTES # BLD AUTO: 0.02 THOUSAND/UL (ref 0–0.2)
IMM GRANULOCYTES NFR BLD AUTO: 0 % (ref 0–2)
KETONES UR STRIP-MCNC: NEGATIVE MG/DL
LEUKOCYTE ESTERASE UR QL STRIP: NEGATIVE
LYMPHOCYTES # BLD AUTO: 1.98 THOUSANDS/ΜL (ref 0.6–4.47)
LYMPHOCYTES NFR BLD AUTO: 34 % (ref 14–44)
MAGNESIUM SERPL-MCNC: 2.1 MG/DL (ref 1.9–2.7)
MCH RBC QN AUTO: 31.3 PG (ref 26.8–34.3)
MCHC RBC AUTO-ENTMCNC: 33.6 G/DL (ref 31.4–37.4)
MCV RBC AUTO: 93 FL (ref 82–98)
MONOCYTES # BLD AUTO: 0.53 THOUSAND/ΜL (ref 0.17–1.22)
MONOCYTES NFR BLD AUTO: 9 % (ref 4–12)
MUCOUS THREADS UR QL AUTO: ABNORMAL
NEUTROPHILS # BLD AUTO: 3.1 THOUSANDS/ΜL (ref 1.85–7.62)
NEUTS SEG NFR BLD AUTO: 53 % (ref 43–75)
NITRITE UR QL STRIP: NEGATIVE
NON-SQ EPI CELLS URNS QL MICRO: ABNORMAL /HPF
NRBC BLD AUTO-RTO: 0 /100 WBCS
PH UR STRIP.AUTO: 7.5 [PH]
PHOSPHATE SERPL-MCNC: 2.9 MG/DL (ref 2.7–4.5)
PLATELET # BLD AUTO: 329 THOUSANDS/UL (ref 149–390)
PMV BLD AUTO: 9.3 FL (ref 8.9–12.7)
POTASSIUM SERPL-SCNC: 3.7 MMOL/L (ref 3.5–5.3)
PROT SERPL-MCNC: 7.5 G/DL (ref 6.4–8.4)
PROT UR STRIP-MCNC: NEGATIVE MG/DL
PROT UR-MCNC: 14.9 MG/DL
PROT/CREAT UR: 0.1 MG/G{CREAT} (ref 0–0.1)
RBC # BLD AUTO: 4.83 MILLION/UL (ref 3.81–5.12)
RBC #/AREA URNS AUTO: ABNORMAL /HPF
SODIUM SERPL-SCNC: 140 MMOL/L (ref 135–147)
SP GR UR STRIP.AUTO: 1.02
UROBILINOGEN UR QL STRIP.AUTO: 0.2 E.U./DL
WBC # BLD AUTO: 5.84 THOUSAND/UL (ref 4.31–10.16)
WBC #/AREA URNS AUTO: ABNORMAL /HPF

## 2025-02-08 PROCEDURE — 85025 COMPLETE CBC W/AUTO DIFF WBC: CPT

## 2025-02-08 PROCEDURE — 84156 ASSAY OF PROTEIN URINE: CPT

## 2025-02-08 PROCEDURE — 81001 URINALYSIS AUTO W/SCOPE: CPT

## 2025-02-08 PROCEDURE — 82306 VITAMIN D 25 HYDROXY: CPT

## 2025-02-08 PROCEDURE — 82043 UR ALBUMIN QUANTITATIVE: CPT

## 2025-02-08 PROCEDURE — 84100 ASSAY OF PHOSPHORUS: CPT

## 2025-02-08 PROCEDURE — 80053 COMPREHEN METABOLIC PANEL: CPT

## 2025-02-08 PROCEDURE — 82570 ASSAY OF URINE CREATININE: CPT

## 2025-02-08 PROCEDURE — 83970 ASSAY OF PARATHORMONE: CPT

## 2025-02-08 PROCEDURE — 36415 COLL VENOUS BLD VENIPUNCTURE: CPT

## 2025-02-08 PROCEDURE — 83735 ASSAY OF MAGNESIUM: CPT

## 2025-02-09 LAB
25(OH)D3 SERPL-MCNC: 24.9 NG/ML (ref 30–100)
CREAT UR-MCNC: 153.5 MG/DL
MICROALBUMIN UR-MCNC: 13.3 MG/L
MICROALBUMIN/CREAT 24H UR: 9 MG/G CREATININE (ref 0–30)
PTH-INTACT SERPL-MCNC: 72.3 PG/ML (ref 12–88)

## 2025-02-12 ENCOUNTER — RESULTS FOLLOW-UP (OUTPATIENT)
Age: 55
End: 2025-02-12

## 2025-02-12 NOTE — RESULT ENCOUNTER NOTE
I called and spoke with Tommy. She states she is supposed to be taking a Vit D supplement but has not been taking it lately. She states she saw her numbers are low and she will restart taking the Vit D supplement.    She had no questions/concerns.

## 2025-03-17 NOTE — PROGRESS NOTES
Assessment/Plan:    No problem-specific Assessment & Plan notes found for this encounter  Diagnoses and all orders for this visit:    Primary hypertension  -     CBC and differential; Future  -     Comprehensive metabolic panel; Future    Mixed hyperlipidemia  -     CBC and differential; Future  -     Comprehensive metabolic panel; Future  -     Lipid panel; Future  -     TSH, 3rd generation; Future    Insomnia, unspecified type  -     CBC and differential; Future    Allergic rhinitis due to house dust mite  -     CBC and differential; Future    Chronic sinusitis, unspecified location  -     CBC and differential; Future  -     amoxicillin (AMOXIL) 875 mg tablet; Take 1 tablet (875 mg total) by mouth 2 (two) times a day for 10 days  -     methylPREDNISolone 4 MG tablet therapy pack; Use as directed on package    Vitamin D deficiency  -     CBC and differential; Future  -     Vitamin D 25 hydroxy; Future    Fatigue, unspecified type  -     POCT hemoglobin A1c    orders recommendations as noted above  Previous laboratory testing reviewed with her  Blood work relatively stable  Continue with the atorvastatin as previously  Watch diet  Increase fiber in diet  Try to increase activity level  Blood pressure well controlled  Continue current medications  Watch salt intake  Watch for any worsening of chronic headaches symptoms  Sinus symptoms are recurrent  Likely worsened by her recent increase in allergy symptoms  Amoxicillin and Medrol Dosepak as noted above  Call or follow-up if symptoms worsen or persist   Stressors at home discussed  Continue with the Ambien and the dejon as previously for sleep  Watch for any worsening  Watch for any side effects  Continue with the vitamin-D supplementation  She will return for the flu shot after completing the antibiotics  Coronavirus booster discussed  Continue with mammograms on a yearly basis  Recommend follow-up with gyn    Will have her follow up in about 6 months or sooner if needed  Subjective:      Patient ID: Ghada Charles is a 49 y o  female  She presents for routine follow-up  Continues to have a lot of stressors at home with issues with her   He continues to drink a significant amount of alcohol  Still has stressors at work  Increased anxiety symptoms at times  Has difficulty concentrating at times  Does sleep relatively well with the Ambien as well as the recent addition of edibles  Continues with the neck pain  This has been chronic for her  Headaches are in intermittent issue but not as severe as they had been in the past   Has noticed some recent increase in her allergy symptoms  Has taken numerous coronavirus test that were negative  Has had recent worsening of sinus symptoms with increased nasal congestion as well as some sinus pressure  Some ear pain especially on the right  Has had some recent stressors with the death of 1 of her close friends  Was unsure whether some of the sinus congestion was related to her crying a lot over the last week or so  Tolerating her blood pressure medication without difficulty  Denies any localized weakness  She has been tolerating the atorvastatin well  Denies any significant muscle aches or weakness with this  The following portions of the patient's history were reviewed and updated as appropriate: She  has a past medical history of Anxiety, Bruxism (teeth grinding), Endometriosis, Esophageal reflux, High cholesterol, Migraines, and Plantar fasciitis    She   Patient Active Problem List    Diagnosis Date Noted   • Cellulitis, face 05/17/2022   • Shortness of breath 03/16/2020   • Allergic conjunctivitis of both eyes 09/24/2019   • Allergic rhinitis due to house dust mite 09/24/2019   • Chronic seasonal allergic rhinitis due to pollen 09/24/2019   • Peripheral edema 02/05/2019   • Tachycardia 02/05/2019   • Hypersomnia 11/29/2018   • Chronic rhinitis 11/29/2018   • Obesity (BMI 30-39 9) 2018   • Chondromalacia patellae 10/05/2018   • Chronic migraine without aura without status migrainosus, not intractable 2018   • Insomnia 2018   • Vitamin D deficiency 2017   • Anxiety 2017   • Hyperlipidemia 2016   • Nonspecific abnormal results of function study of thyroid 2016   • Fatigue 2015   • Hypertension 2015   • Obstructive sleep apnea 2015   • Cough variant asthma 2013   • Chronic sinusitis 2013     She  has a past surgical history that includes  section; Foot surgery (Left); LAPAROSCOPY; [place] tooth extraction; Tooth extraction; and Bone graft  Her family history includes Abdominal aortic aneurysm in her father; Allergies in her brother, daughter, daughter, sister, and sister; Asperger's syndrome in her brother and family; Bipolar disorder in her family and sister; Cancer in her maternal uncle; Colon cancer in her maternal grandmother; Depression in her family and mother; Diabetes in her family; Heart murmur in her mother; Hypertension in her family and father; Hypothyroidism in her sister; No Known Problems in her maternal grandfather, paternal grandfather, and paternal grandmother; Other in her mother; Raynaud syndrome in her family, sister, and sister  She was adopted  She  reports that she has never smoked  She has never used smokeless tobacco  She reports current alcohol use  She reports that she does not use drugs    Current Outpatient Medications   Medication Sig Dispense Refill   • ALBUTEROL IN Inhale     • amoxicillin (AMOXIL) 875 mg tablet Take 1 tablet (875 mg total) by mouth 2 (two) times a day for 10 days 20 tablet 0   • atorvastatin (LIPITOR) 20 mg tablet take 1 tablet by mouth once daily 90 tablet 3   • Cholecalciferol (VITAMIN D3 PO) Take by mouth     • fluticasone (FLONASE) 50 mcg/act nasal spray 2 sprays into each nostril daily 16 g 11   • methylPREDNISolone 4 MG tablet therapy pack Use as directed on package 21 each 0   • Olopatadine HCl 0 6 % SOLN 2 actuation into each nostril 2 (two) times a day 1 Bottle 11   • omeprazole (PriLOSEC) 20 mg delayed release capsule Take 20 mg by mouth daily as needed      • Rimegepant Sulfate (Nurtec) 75 MG TBDP Take 75 mg by mouth every other day 16 tablet 11   • verapamil (CALAN-SR) 240 mg CR tablet take 1 tablet by mouth once daily at bedtime 90 tablet 1   • zolpidem (AMBIEN) 5 mg tablet take 1 tablet by mouth at bedtime if needed for sleep 30 tablet 1     No current facility-administered medications for this visit  Current Outpatient Medications on File Prior to Visit   Medication Sig   • ALBUTEROL IN Inhale   • atorvastatin (LIPITOR) 20 mg tablet take 1 tablet by mouth once daily   • Cholecalciferol (VITAMIN D3 PO) Take by mouth   • fluticasone (FLONASE) 50 mcg/act nasal spray 2 sprays into each nostril daily   • Olopatadine HCl 0 6 % SOLN 2 actuation into each nostril 2 (two) times a day   • omeprazole (PriLOSEC) 20 mg delayed release capsule Take 20 mg by mouth daily as needed    • Rimegepant Sulfate (Nurtec) 75 MG TBDP Take 75 mg by mouth every other day   • verapamil (CALAN-SR) 240 mg CR tablet take 1 tablet by mouth once daily at bedtime   • zolpidem (AMBIEN) 5 mg tablet take 1 tablet by mouth at bedtime if needed for sleep     No current facility-administered medications on file prior to visit  She is allergic to gabapentin       Review of Systems   Constitutional: Positive for activity change and fatigue  Negative for appetite change, chills and fever  HENT: Positive for congestion, ear pain, postnasal drip and sinus pressure  Negative for rhinorrhea  Eyes: Negative for visual disturbance  Respiratory: Positive for cough  Negative for chest tightness and shortness of breath  Cardiovascular: Negative for chest pain and palpitations  Gastrointestinal: Negative for abdominal pain, blood in stool, diarrhea, nausea and vomiting     Endocrine: Negative for polydipsia, polyphagia and polyuria  Genitourinary: Negative for dysuria, frequency and urgency  Musculoskeletal: Positive for neck pain and neck stiffness  Negative for gait problem  Skin: Negative for color change  Allergic/Immunologic: Positive for environmental allergies  Neurological: Positive for headaches  Negative for dizziness  Hematological: Does not bruise/bleed easily  Psychiatric/Behavioral: Positive for dysphoric mood and sleep disturbance  Negative for confusion  The patient is nervous/anxious  Objective:      /74 (BP Location: Left arm, Patient Position: Sitting, Cuff Size: Large)   Pulse 88   Temp 97 7 °F (36 5 °C)   Ht 5' 6" (1 676 m)   Wt 97 1 kg (214 lb)   SpO2 96%   BMI 34 54 kg/m²          Physical Exam  Vitals and nursing note reviewed  Constitutional:       General: She is not in acute distress  Appearance: She is well-developed, well-groomed and overweight  HENT:      Head: Normocephalic and atraumatic  Comments: Boggy nasal mucosa with purulent nasal discharge and some crusting bilaterally with some scabbing noted; right tympanic membrane slightly erythematous; bilateral tympanic membranes with effusions     Mouth/Throat:      Mouth: Oropharynx is clear and moist    Eyes:      General:         Right eye: No discharge  Left eye: No discharge  Conjunctiva/sclera: Conjunctivae normal       Pupils: Pupils are equal, round, and reactive to light  Cardiovascular:      Rate and Rhythm: Normal rate and regular rhythm  Heart sounds: Normal heart sounds  No murmur heard  No friction rub  No gallop  Pulmonary:      Effort: No respiratory distress  Breath sounds: No wheezing or rales  Abdominal:      General: Bowel sounds are normal  There is no distension  Tenderness: There is no abdominal tenderness  Musculoskeletal:         General: No edema  Lymphadenopathy:      Cervical: No cervical adenopathy  Skin:     General: Skin is warm and dry  Neurological:      Mental Status: She is alert and oriented to person, place, and time  Psychiatric:         Mood and Affect: Mood and affect, mood and affect normal          Speech: Speech normal          Behavior: Behavior normal  Behavior is cooperative  Cognition and Memory: Cognition and memory normal          BMI Counseling: Body mass index is 34 54 kg/m²  The BMI is above normal  Nutrition recommendations include decreasing portion sizes, encouraging healthy choices of fruits and vegetables, decreasing fast food intake, consuming healthier snacks, limiting drinks that contain sugar, moderation in carbohydrate intake and reducing intake of cholesterol  Exercise recommendations include exercising 3-5 times per week  Rationale for BMI follow-up plan is due to patient being overweight or obese  Jp Patrick

## 2025-04-03 ENCOUNTER — TELEPHONE (OUTPATIENT)
Dept: INTERNAL MEDICINE CLINIC | Facility: CLINIC | Age: 55
End: 2025-04-03

## 2025-04-03 ENCOUNTER — TELEPHONE (OUTPATIENT)
Age: 55
End: 2025-04-03

## 2025-04-03 DIAGNOSIS — J32.9 CHRONIC SINUSITIS, UNSPECIFIED LOCATION: Primary | ICD-10-CM

## 2025-04-03 DIAGNOSIS — J30.1 CHRONIC SEASONAL ALLERGIC RHINITIS DUE TO POLLEN: ICD-10-CM

## 2025-04-03 DIAGNOSIS — J30.89 ALLERGIC RHINITIS DUE TO HOUSE DUST MITE: ICD-10-CM

## 2025-04-03 RX ORDER — FLUTICASONE PROPIONATE 50 MCG
2 SPRAY, SUSPENSION (ML) NASAL DAILY
Qty: 16 G | Refills: 5 | Status: SHIPPED | OUTPATIENT
Start: 2025-04-03 | End: 2026-04-03

## 2025-04-03 NOTE — TELEPHONE ENCOUNTER
Medication: fluticasone (FLONASE) 50 mcg/act nasal spray     Dose/Frequency: 2 sprays into each nostril daily     Quantity: 16 g     Pharmacy: [id number] - [address] [facility] [address]     Office:   [x] PCP/Provider -   [] Speciality/Provider -     Does the patient have enough for 3 days?   [] Yes   [x] No - Send as HP to POD

## 2025-04-03 NOTE — TELEPHONE ENCOUNTER
Patient states she has a sinus infection again. She is wondering if another prescription can be sent without an appointment, or if she needs to be seen. Patient requests call back to advise.

## 2025-04-03 NOTE — TELEPHONE ENCOUNTER
Just FYI.  She does not want the streroid.   Just an Abx.  Please send to [facility] at [facility].

## 2025-04-09 ENCOUNTER — CONSULT (OUTPATIENT)
Age: 55
End: 2025-04-09
Payer: COMMERCIAL

## 2025-04-09 VITALS
SYSTOLIC BLOOD PRESSURE: 132 MMHG | WEIGHT: 199 LBS | TEMPERATURE: 97.7 F | BODY MASS INDEX: 31.98 KG/M2 | HEIGHT: 66 IN | OXYGEN SATURATION: 97 % | HEART RATE: 72 BPM | DIASTOLIC BLOOD PRESSURE: 76 MMHG

## 2025-04-09 DIAGNOSIS — E87.6 HYPOKALEMIA: ICD-10-CM

## 2025-04-09 DIAGNOSIS — N20.0 NEPHROLITHIASIS: Primary | ICD-10-CM

## 2025-04-09 DIAGNOSIS — G47.00 INSOMNIA, UNSPECIFIED TYPE: ICD-10-CM

## 2025-04-09 DIAGNOSIS — I10 PRIMARY HYPERTENSION: ICD-10-CM

## 2025-04-09 DIAGNOSIS — E55.9 VITAMIN D DEFICIENCY: ICD-10-CM

## 2025-04-09 DIAGNOSIS — G43.009 MIGRAINE WITHOUT AURA AND WITHOUT STATUS MIGRAINOSUS, NOT INTRACTABLE: ICD-10-CM

## 2025-04-09 DIAGNOSIS — I89.0 LYMPHEDEMA: ICD-10-CM

## 2025-04-09 PROCEDURE — 99204 OFFICE O/P NEW MOD 45 MIN: CPT | Performed by: INTERNAL MEDICINE

## 2025-04-09 RX ORDER — ZOLPIDEM TARTRATE 5 MG/1
5 TABLET ORAL AS NEEDED
Start: 2025-04-09

## 2025-04-09 RX ORDER — RIMEGEPANT SULFATE 75 MG/75MG
75 TABLET, ORALLY DISINTEGRATING ORAL AS NEEDED
Start: 2025-04-09

## 2025-04-09 NOTE — ASSESSMENT & PLAN NOTE
Patient has well-controlled blood pressure, however she remains on a regular diet, and recommended that she look to reduce total sodium intake for hypertensive management as well as other edema, please refer below regarding that matter.    Patient also noted to have a chronically reduced serum potassium level of unclear etiology in the setting of a small increase in bicarbonate.  Will rule out an aldosterone imbalance, and go from there.  No medication changes recommended, patient remains on verapamil 240 mg once daily.

## 2025-04-09 NOTE — PROGRESS NOTES
[facility] [place]  Tacos Rasheed DO    Name: Tommy Busch  YOB: 1970      Assessment/Plan:    Nephrolithiasis  Patient continues to have kidney stone burden that we will try to address with a 24-hour urine to try to better determine etiology of the stone formation.  Although there is a kidney stone sent for sampling from 2023, unfortunately it looks like it was not an appropriate specimen and the composition was not able to be determined.    Patient to focus on drinking enough fluid to produce a minimum of 2 quarts of urine output.  She is currently enjoying lemonade during some parts of the day which I recommended to continue for now unless she is one of the few varieties of patients who has to have a pH.  We will discuss those matters as her 24-hour urine comes back.  In addition, supplements or other medication interventions such as thiazides may be indicated depending on how she progresses clinically.    Hypertension  Patient has well-controlled blood pressure, however she remains on a regular diet, and recommended that she look to reduce total sodium intake for hypertensive management as well as other edema, please refer below regarding that matter.    Patient also noted to have a chronically reduced serum potassium level of unclear etiology in the setting of a small increase in bicarbonate.  Will rule out an aldosterone imbalance, and go from there.  No medication changes recommended, patient remains on verapamil 240 mg once daily.    Hypokalemia  Potassium level stable with 20 mill equivalents of oral potassium chloride supplementation daily.  Continue this for now, unclear if there is excess aldosterone at this time, we will check aldosterone/renin.    Lymphedema  Recommended for the patient to focus on reducing sodium intake in her diet as best as possible.  Did not recommend active diuretic at this time.  Patient had no pitting edema on  examination.         Problem List Items Addressed This Visit          Cardiovascular and Mediastinum    Hypertension    Patient has well-controlled blood pressure, however she remains on a regular diet, and recommended that she look to reduce total sodium intake for hypertensive management as well as other edema, please refer below regarding that matter.    Patient also noted to have a chronically reduced serum potassium level of unclear etiology in the setting of a small increase in bicarbonate.  Will rule out an aldosterone imbalance, and go from there.  No medication changes recommended, patient remains on verapamil 240 mg once daily.         Relevant Orders    Aldosterone/Renin Activity Ratio       Genitourinary    Nephrolithiasis - Primary    Patient continues to have kidney stone burden that we will try to address with a 24-hour urine to try to better determine etiology of the stone formation.  Although there is a kidney stone sent for sampling from 2023, unfortunately it looks like it was not an appropriate specimen and the composition was not able to be determined.    Patient to focus on drinking enough fluid to produce a minimum of 2 quarts of urine output.  She is currently enjoying lemonade during some parts of the day which I recommended to continue for now unless she is one of the few varieties of patients who has to have a pH.  We will discuss those matters as her 24-hour urine comes back.  In addition, supplements or other medication interventions such as thiazides may be indicated depending on how she progresses clinically.         Relevant Orders    Stone risk profile       Neurology/Sleep    Insomnia    Relevant Medications    zolpidem (AMBIEN) 5 mg tablet       Other    Vitamin D deficiency    Hypokalemia    Potassium level stable with 20 mill equivalents of oral potassium chloride supplementation daily.  Continue this for now, unclear if there is excess aldosterone at this time, we will check  aldosterone/renin.         Lymphedema    Recommended for the patient to focus on reducing sodium intake in her diet as best as possible.  Did not recommend active diuretic at this time.  Patient had no pitting edema on examination.          Other Visit Diagnoses         Migraine without aura and without status migrainosus, not intractable        Relevant Medications    rimegepant sulfate (Nurtec) 75 mg TBDP            Thank you for loss of participate in the care of your patient.  We will perform a 24-hour urine to further evaluate causes of nephrolithiasis and address potential areas of interventions as indicated.  Will also rule out aldosterone abnormality by checking aldosterone/renin level.  No medication changes recommended at this time.    Subjective:      Patient ID: Tommy Busch is a 54 y.o. female.    Patient presents for initial evaluation.    We reviewed labs in detail, most recent creatinine noted to be 0.85 mg/dL, estimate GFR 78 mL/min.    There were no significant electrolyte abnormalities noted.  Patient is taking all medications as prescribed with no specific side effects and denies use of nonsteroid anti-inflammatory medications.              The following portions of the patient's history were reviewed and updated as appropriate: allergies, current medications, past family history, past medical history, past social history, past surgical history and problem list.    Review of Systems   All other systems reviewed and are negative.        Social History     Socioeconomic History    Marital status: /Civil Union     Spouse name: Andreas    Number of children: 2    Years of education: None    Highest education level: None   Occupational History    Occupation:    Tobacco Use    Smoking status: Never    Smokeless tobacco: Never    Tobacco comments:     I hate it.  Please get my  to stop using it.   Vaping Use    Vaping status: Never Used   Substance and Sexual Activity    Alcohol  use: Not Currently     Comment: Consume 3-6  drinks/ year- rarely    Drug use: Yes     Types: Marijuana     Comment: thc gummies- as needed for sleep    Sexual activity: Yes     Partners: Male     Birth control/protection: None   Other Topics Concern    None   Social History Narrative    Caffeine use        House built in 1980    Main source of heat is coal stove, also has wood burning stove for extreme cold temps and as backup has electric baseboard heat.    Central AC    Hardwood floor with area rug in bedroom    Dehumidifier in basement.    Basement is dry, finished, some musty smell after rain and sometimes when AC turns off before starting up coal stove.    No humidifier or air purifier.    Lives with  and 2 daughters        Pets - 3 dogs - Sera Marcano, Brodie            Caffeine - Coffee 1 large every morning    Tea - iced - 5 to 6 days a week - 2 cups a day    Soda - rarely    Chocolate - small amount occasionally     Social Drivers of Health     Financial Resource Strain: Not on file   Food Insecurity: Not on file   Transportation Needs: Not on file   Physical Activity: Inactive (8/25/2020)    Exercise Vital Sign     Days of Exercise per Week: 0 days     Minutes of Exercise per Session: 0 min   Stress: Not on file   Social Connections: Not on file   Intimate Partner Violence: Not on file   Housing Stability: Not on file     Past Medical History:   Diagnosis Date    Abnormal Pap smear of cervix 1997    Allergic     Anxiety     Asthma     seasonal allergy induced    Bruxism (teeth grinding)     Claustrophobia     Depression     Endometriosis     Esophageal reflux     Fibrocystic breast 12/28/18    Fibroid As long as I can remember    Dense cystic breasts - fibriods.     Had laparoscopic surgery for endometriosis and later had an ablation    GERD (gastroesophageal reflux disease)     Headache(784.0)     Headache, tension-type     High cholesterol     Hypertension     Kidney stone     Migraines      Neurological disorder     Migraines    Plantar fasciitis     Seasonal allergies     Shingles 2017    Skin disorder     Pressure urticaria    Sleep apnea     can't use CPAP    Swelling of both lower extremities     Varicella 1978    Wears contact lenses     Wears glasses      Past Surgical History:   Procedure Laterality Date    BONE GRAFT      L jaw for future false tooth implant     SECTION      ENDOMETRIAL ABLATION      FL RETROGRADE PYELOGRAM  2023    FL RETROGRADE PYELOGRAM  2023    FOOT SURGERY Left     KIDNEY STONE SURGERY  / or 8 and     Lg stone. Bad Kidney infection...emerg surg ended up placing Stent to drain infection & had to back to blast stone and remove    KIDNEY SURGERY  2023 - 2023    Kidney infection with stone 3 surgeries     LAPAROSCOPY      of uterus    [place] CYSTO/URETERO W/LITHOTRIPSY &INDWELL STENT INSRT Left 2023    Procedure: CYSTOSCOPY  RETROGRADE PYELOGRAM AND INSERTION STENT URETERAL;  Surgeon: Nick Gallagher MD;  Location: [facility];  Service: Urology    WV CYSTO/URETERO W/LITHOTRIPSY &INDWELL STENT INSRT Left 2023    Procedure: CYSTO USCOPE W/  LASER, &STENT;  Surgeon: Lee Holliday MD;  Location: [facility];  Service: Urology    WV CYSTO/URETERO W/LITHOTRIPSY &INDWELL STENT INSRT Left 08/15/2023    Procedure: CYSTOSCOPY URETEROSCOPY WITH LITHOTRIPSY HOLMIUM LASER, RETROGRADE PYELOGRAM AND INSERTION STENT URETERAL;  Surgeon: Ruben Donahue MD;  Location:  MAIN OR;  Service: Urology    TOOTH EXTRACTION      3 besides wisdom teeth    WISDOM TOOTH EXTRACTION      2 of teeth       Current Outpatient Medications:     albuterol (PROVENTIL HFA,VENTOLIN HFA) 90 mcg/act inhaler, Inhale 2 puffs every 6 (six) hours as needed for wheezing, Disp: , Rfl:     amoxicillin-clavulanate (AUGMENTIN) 875-125 mg per tablet, Take 1 tablet by mouth 2 (two) times a day for 7 days (Patient taking differently: Take 1 tablet by mouth 2  (two) times a day), Disp: 14 tablet, Rfl: 0    atorvastatin (LIPITOR) 20 mg tablet, take 1 tablet by mouth once daily, Disp: 90 tablet, Rfl: 3    Cholecalciferol (VITAMIN D3 PO), Take by mouth, Disp: , Rfl:     escitalopram (LEXAPRO) 20 mg tablet, Take 1 tablet (20 mg total) by mouth daily, Disp: 90 tablet, Rfl: 1    fluticasone (FLONASE) 50 mcg/act nasal spray, 2 sprays into each nostril daily (Patient taking differently: 2 sprays into each nostril if needed for allergies She uses this as needed unless allergy season then she uses it daily), Disp: 16 g, Rfl: 5    ibuprofen (MOTRIN) 200 mg tablet, Take by mouth every 6 (six) hours as needed for mild pain, Disp: , Rfl:     Levocetirizine Dihydrochloride (XYZAL PO), Take by mouth in the morning, Disp: , Rfl:     NON FORMULARY, Take 25 mg by mouth if needed THC gummies for sleep, Disp: , Rfl:     omeprazole (PriLOSEC) 20 mg delayed release capsule, Take 20 mg by mouth if needed, Disp: , Rfl:     potassium chloride (Klor-Con M20) 20 mEq tablet, take 1 tablet by mouth once daily, Disp: 90 tablet, Rfl: 3    rimegepant sulfate (Nurtec) 75 mg TBDP, Take 1 tablet (75 mg total) by mouth if needed (as needed), Disp: , Rfl:     SEMAGLUTIDE PO, Take 24 Units by mouth once a week, Disp: , Rfl:     verapamil (CALAN-SR) 240 mg CR tablet, take 1 tablet by mouth once daily at bedtime, Disp: 90 tablet, Rfl: 1    zolpidem (AMBIEN) 5 mg tablet, Take 1 tablet (5 mg total) by mouth if needed for sleep, Disp: , Rfl:     Lab Results   Component Value Date     12/09/2017    SODIUM 140 02/08/2025    K 3.7 02/08/2025     02/08/2025    CO2 30 02/08/2025    AGAP 6 02/08/2025    BUN 11 02/08/2025    CREATININE 0.85 02/08/2025    GLUC 91 05/21/2023    GLUF 84 02/08/2025    CALCIUM 9.7 02/08/2025    AST 12 (L) 02/08/2025    ALT 9 02/08/2025    ALKPHOS 85 02/08/2025    PROT 7.2 12/09/2017    TP 7.5 02/08/2025    BILITOT 0.3 12/09/2017    TBILI 0.64 02/08/2025    EGFR 78 02/08/2025  "    Lab Results   Component Value Date    WBC 5.84 02/08/2025    HGB 15.1 02/08/2025    HCT 44.9 02/08/2025    MCV 93 02/08/2025     02/08/2025     Lab Results   Component Value Date    CHOLESTEROL 156 07/06/2024    CHOLESTEROL 243 (H) 07/22/2023    CHOLESTEROL 134 05/09/2023     Lab Results   Component Value Date    HDL 52 07/06/2024    HDL 36 (L) 07/22/2023    HDL 53 05/09/2023     Lab Results   Component Value Date    LDLCALC 81 07/06/2024    LDLCALC 158 (H) 07/22/2023    LDLCALC 52 05/09/2023     Lab Results   Component Value Date    TRIG 115 07/06/2024    TRIG 245 (H) 07/22/2023    TRIG 147 05/09/2023     No results found for: \"CHOLHDL\"  Lab Results   Component Value Date    SLV0LWFLTCBZ 2.962 10/14/2024    TSH 2.26 05/31/2018     Lab Results   Component Value Date    PTH 72.3 02/08/2025    CALCIUM 9.7 02/08/2025    PHOS 2.9 02/08/2025     No results found for: \"SPEP\", \"UPEP\"  No results found for: \"MICROALBUR\", \"SGVH30HNP\"        Objective:      /76 (BP Location: Left arm, Patient Position: Sitting, Cuff Size: Standard)   Pulse 72   Temp 97.7 °F (36.5 °C) (Temporal)   Ht 5' 6\" (1.676 m)   Wt 90.3 kg (199 lb)   LMP 06/01/2018   SpO2 97%   BMI 32.12 kg/m²          Physical Exam  Vitals reviewed.   Constitutional:       General: She is not in acute distress.     Appearance: Normal appearance.   HENT:      Head: Normocephalic and atraumatic.      Right Ear: External ear normal.      Left Ear: External ear normal.   Eyes:      Conjunctiva/sclera: Conjunctivae normal.   Cardiovascular:      Rate and Rhythm: Normal rate and regular rhythm.      Heart sounds: Normal heart sounds.   Pulmonary:      Effort: No respiratory distress.      Breath sounds: No wheezing.   Abdominal:      Palpations: Abdomen is soft.   Musculoskeletal:      Comments: Bilateral lymphedema with no pitting edema   Skin:     General: Skin is warm and dry.   Neurological:      General: No focal deficit present.      Mental " Status: She is alert and oriented to person, place, and time.   Psychiatric:         Mood and Affect: Mood normal.         Behavior: Behavior normal.

## 2025-04-09 NOTE — ASSESSMENT & PLAN NOTE
Potassium level stable with 20 mill equivalents of oral potassium chloride supplementation daily.  Continue this for now, unclear if there is excess aldosterone at this time, we will check aldosterone/renin.

## 2025-04-09 NOTE — ASSESSMENT & PLAN NOTE
Patient continues to have kidney stone burden that we will try to address with a 24-hour urine to try to better determine etiology of the stone formation.  Although there is a kidney stone sent for sampling from 2023, unfortunately it looks like it was not an appropriate specimen and the composition was not able to be determined.    Patient to focus on drinking enough fluid to produce a minimum of 2 quarts of urine output.  She is currently enjoying lemonade during some parts of the day which I recommended to continue for now unless she is one of the few varieties of patients who has to have a pH.  We will discuss those matters as her 24-hour urine comes back.  In addition, supplements or other medication interventions such as thiazides may be indicated depending on how she progresses clinically.

## 2025-04-09 NOTE — ASSESSMENT & PLAN NOTE
Recommended for the patient to focus on reducing sodium intake in her diet as best as possible.  Did not recommend active diuretic at this time.  Patient had no pitting edema on examination.

## 2025-04-21 DIAGNOSIS — E78.2 MIXED HYPERLIPIDEMIA: ICD-10-CM

## 2025-04-21 DIAGNOSIS — G43.709 CHRONIC MIGRAINE WITHOUT AURA WITHOUT STATUS MIGRAINOSUS, NOT INTRACTABLE: ICD-10-CM

## 2025-04-21 RX ORDER — VERAPAMIL HYDROCHLORIDE 240 MG/1
240 TABLET, FILM COATED, EXTENDED RELEASE ORAL
Qty: 90 TABLET | Refills: 1 | Status: SHIPPED | OUTPATIENT
Start: 2025-04-21

## 2025-04-21 RX ORDER — ATORVASTATIN CALCIUM 20 MG/1
20 TABLET, FILM COATED ORAL DAILY
Qty: 90 TABLET | Refills: 1 | Status: SHIPPED | OUTPATIENT
Start: 2025-04-21

## 2025-05-03 ENCOUNTER — APPOINTMENT (OUTPATIENT)
Dept: LAB | Facility: HOSPITAL | Age: 55
End: 2025-05-03
Payer: COMMERCIAL

## 2025-05-03 DIAGNOSIS — I10 PRIMARY HYPERTENSION: ICD-10-CM

## 2025-05-03 DIAGNOSIS — G47.00 INSOMNIA, UNSPECIFIED TYPE: ICD-10-CM

## 2025-05-03 DIAGNOSIS — E78.2 MIXED HYPERLIPIDEMIA: ICD-10-CM

## 2025-05-03 DIAGNOSIS — F41.9 ANXIETY: ICD-10-CM

## 2025-05-03 DIAGNOSIS — J32.9 CHRONIC SINUSITIS, UNSPECIFIED LOCATION: ICD-10-CM

## 2025-05-03 DIAGNOSIS — F33.1 MODERATE EPISODE OF RECURRENT MAJOR DEPRESSIVE DISORDER (HCC): ICD-10-CM

## 2025-05-03 DIAGNOSIS — E55.9 VITAMIN D DEFICIENCY: ICD-10-CM

## 2025-05-03 LAB
ALBUMIN SERPL BCG-MCNC: 4.4 G/DL (ref 3.5–5)
ALP SERPL-CCNC: 77 U/L (ref 34–104)
ALT SERPL W P-5'-P-CCNC: 11 U/L (ref 7–52)
ANION GAP SERPL CALCULATED.3IONS-SCNC: 7 MMOL/L (ref 4–13)
AST SERPL W P-5'-P-CCNC: 13 U/L (ref 13–39)
BASOPHILS # BLD AUTO: 0.07 THOUSANDS/ÂΜL (ref 0–0.1)
BASOPHILS NFR BLD AUTO: 1 % (ref 0–1)
BILIRUB SERPL-MCNC: 0.59 MG/DL (ref 0.2–1)
BUN SERPL-MCNC: 12 MG/DL (ref 5–25)
CALCIUM SERPL-MCNC: 9.5 MG/DL (ref 8.4–10.2)
CHLORIDE SERPL-SCNC: 104 MMOL/L (ref 96–108)
CHOLEST SERPL-MCNC: 145 MG/DL (ref ?–200)
CO2 SERPL-SCNC: 28 MMOL/L (ref 21–32)
CREAT SERPL-MCNC: 0.83 MG/DL (ref 0.6–1.3)
EOSINOPHIL # BLD AUTO: 0.21 THOUSAND/ÂΜL (ref 0–0.61)
EOSINOPHIL NFR BLD AUTO: 4 % (ref 0–6)
ERYTHROCYTE [DISTWIDTH] IN BLOOD BY AUTOMATED COUNT: 12.1 % (ref 11.6–15.1)
GFR SERPL CREATININE-BSD FRML MDRD: 80 ML/MIN/1.73SQ M
GLUCOSE P FAST SERPL-MCNC: 82 MG/DL (ref 65–99)
HCT VFR BLD AUTO: 45.3 % (ref 34.8–46.1)
HDLC SERPL-MCNC: 42 MG/DL
HGB BLD-MCNC: 15.4 G/DL (ref 11.5–15.4)
IMM GRANULOCYTES # BLD AUTO: 0.01 THOUSAND/UL (ref 0–0.2)
IMM GRANULOCYTES NFR BLD AUTO: 0 % (ref 0–2)
LDLC SERPL CALC-MCNC: 78 MG/DL (ref 0–100)
LYMPHOCYTES # BLD AUTO: 2.22 THOUSANDS/ÂΜL (ref 0.6–4.47)
LYMPHOCYTES NFR BLD AUTO: 39 % (ref 14–44)
MCH RBC QN AUTO: 31.5 PG (ref 26.8–34.3)
MCHC RBC AUTO-ENTMCNC: 34 G/DL (ref 31.4–37.4)
MCV RBC AUTO: 93 FL (ref 82–98)
MONOCYTES # BLD AUTO: 0.44 THOUSAND/ÂΜL (ref 0.17–1.22)
MONOCYTES NFR BLD AUTO: 8 % (ref 4–12)
NEUTROPHILS # BLD AUTO: 2.75 THOUSANDS/ÂΜL (ref 1.85–7.62)
NEUTS SEG NFR BLD AUTO: 48 % (ref 43–75)
NONHDLC SERPL-MCNC: 103 MG/DL
NRBC BLD AUTO-RTO: 0 /100 WBCS
PLATELET # BLD AUTO: 324 THOUSANDS/UL (ref 149–390)
PMV BLD AUTO: 9 FL (ref 8.9–12.7)
POTASSIUM SERPL-SCNC: 3.9 MMOL/L (ref 3.5–5.3)
PROT SERPL-MCNC: 7.7 G/DL (ref 6.4–8.4)
RBC # BLD AUTO: 4.89 MILLION/UL (ref 3.81–5.12)
SODIUM SERPL-SCNC: 139 MMOL/L (ref 135–147)
TRIGL SERPL-MCNC: 123 MG/DL (ref ?–150)
TSH SERPL DL<=0.05 MIU/L-ACNC: 2.73 UIU/ML (ref 0.45–4.5)
WBC # BLD AUTO: 5.7 THOUSAND/UL (ref 4.31–10.16)

## 2025-05-03 PROCEDURE — 80061 LIPID PANEL: CPT

## 2025-05-03 PROCEDURE — 84443 ASSAY THYROID STIM HORMONE: CPT

## 2025-05-03 PROCEDURE — 80053 COMPREHEN METABOLIC PANEL: CPT

## 2025-05-03 PROCEDURE — 84244 ASSAY OF RENIN: CPT

## 2025-05-03 PROCEDURE — 82306 VITAMIN D 25 HYDROXY: CPT

## 2025-05-03 PROCEDURE — 36415 COLL VENOUS BLD VENIPUNCTURE: CPT

## 2025-05-03 PROCEDURE — 82088 ASSAY OF ALDOSTERONE: CPT

## 2025-05-03 PROCEDURE — 85025 COMPLETE CBC W/AUTO DIFF WBC: CPT

## 2025-05-04 LAB — 25(OH)D3 SERPL-MCNC: 42.9 NG/ML (ref 30–100)

## 2025-05-12 LAB
ALDOST SERPL-MCNC: 18.4 NG/DL (ref 0–30)
ALDOST/RENIN PLAS-RTO: 101.7 {RATIO} (ref 0–30)
RENIN PLAS-CCNC: 0.18 NG/ML/HR (ref 0.17–5.38)

## 2025-05-13 ENCOUNTER — OFFICE VISIT (OUTPATIENT)
Dept: INTERNAL MEDICINE CLINIC | Facility: CLINIC | Age: 55
End: 2025-05-13
Payer: COMMERCIAL

## 2025-05-13 VITALS
HEIGHT: 66 IN | DIASTOLIC BLOOD PRESSURE: 80 MMHG | SYSTOLIC BLOOD PRESSURE: 126 MMHG | HEART RATE: 84 BPM | OXYGEN SATURATION: 97 % | BODY MASS INDEX: 32.66 KG/M2 | WEIGHT: 203.2 LBS

## 2025-05-13 DIAGNOSIS — I10 PRIMARY HYPERTENSION: Primary | ICD-10-CM

## 2025-05-13 DIAGNOSIS — G43.709 CHRONIC MIGRAINE WITHOUT AURA WITHOUT STATUS MIGRAINOSUS, NOT INTRACTABLE: ICD-10-CM

## 2025-05-13 DIAGNOSIS — E55.9 VITAMIN D DEFICIENCY: ICD-10-CM

## 2025-05-13 DIAGNOSIS — E87.6 HYPOKALEMIA: ICD-10-CM

## 2025-05-13 DIAGNOSIS — J30.1 CHRONIC SEASONAL ALLERGIC RHINITIS DUE TO POLLEN: ICD-10-CM

## 2025-05-13 DIAGNOSIS — E78.2 MIXED HYPERLIPIDEMIA: ICD-10-CM

## 2025-05-13 DIAGNOSIS — G47.00 INSOMNIA, UNSPECIFIED TYPE: ICD-10-CM

## 2025-05-13 DIAGNOSIS — J30.89 ALLERGIC RHINITIS DUE TO HOUSE DUST MITE: ICD-10-CM

## 2025-05-13 DIAGNOSIS — F33.1 MODERATE EPISODE OF RECURRENT MAJOR DEPRESSIVE DISORDER (HCC): ICD-10-CM

## 2025-05-13 PROCEDURE — 99495 TRANSJ CARE MGMT MOD F2F 14D: CPT | Performed by: FAMILY MEDICINE

## 2025-05-13 PROCEDURE — 99214 OFFICE O/P EST MOD 30 MIN: CPT | Performed by: FAMILY MEDICINE

## 2025-05-13 NOTE — ASSESSMENT & PLAN NOTE
Orders:  •  fluticasone (FLONASE) 50 mcg/act nasal spray; 2 sprays into each nostril daily  •  CBC and differential; Future

## 2025-05-13 NOTE — ASSESSMENT & PLAN NOTE
Allergy symptoms discussed.  Start Flonase again.  Use of over-the-counter antihistamines discussed.  Continue with the Xyzal.  Prednisone taper for current significant symptoms.  Orders:  •  fluticasone (FLONASE) 50 mcg/act nasal spray; 2 sprays into each nostril daily  •  predniSONE 10 mg tablet; Five pills a day for 2 days, 4 pills a day for 2 days, 3 pills a day for 2 days, 2 pills a day for 2 days, 1 pill a day for 2 days  •  CBC and differential; Future

## 2025-05-13 NOTE — PROGRESS NOTES
Adult Annual Physical  Name: Tommy Busch      : 1970      MRN: [id number]  Encounter Provider: Rylie Dyer MD  Encounter Date: 2025   Encounter department: [facility]    :  Assessment & Plan  Allergic rhinitis due to house dust mite  Allergy symptoms discussed.  Start Flonase again.  Use of over-the-counter antihistamines discussed.  Continue with the Xyzal.  Prednisone taper for current significant symptoms.  Orders:  •  fluticasone (FLONASE) 50 mcg/act nasal spray; 2 sprays into each nostril daily  •  predniSONE 10 mg tablet; Five pills a day for 2 days, 4 pills a day for 2 days, 3 pills a day for 2 days, 2 pills a day for 2 days, 1 pill a day for 2 days  •  CBC and differential; Future    Chronic seasonal allergic rhinitis due to pollen    Orders:  •  fluticasone (FLONASE) 50 mcg/act nasal spray; 2 sprays into each nostril daily  •  CBC and differential; Future    Primary hypertension  Blood pressure currently well-controlled.  Continue current medications.  Watch salt intake.  Stay adequately hydrated.  Orders:  •  CBC and differential; Future  •  Comprehensive metabolic panel; Future    Moderate episode of recurrent major depressive disorder (HCC)  Depression symptoms discussed.  Currently relatively stable.  Continue with the Lexapro.  Some of her depression symptoms are likely situational.  Methods for stress relief discussed.  Adequate sleep discussed.  Depression Screening Follow-up Plan: Patient's depression screening was positive with a PHQ-9 score of 14. Patient with underlying depression and was advised to continue current medications as prescribed.    Orders:  •  CBC and differential; Future    Insomnia, unspecified type  Chronic issues with insomnia discussed.  Continue with the Ambien on an as-needed basis.  Other options discussed to help with her sleep.  Orders:  •  CBC and differential; Future    Mixed hyperlipidemia  Continue with the  atorvastatin.  Watch diet.  Increase fiber in diet.  Orders:  •  CBC and differential; Future  •  Comprehensive metabolic panel; Future  •  Lipid panel; Future  •  TSH, 3rd generation; Future    Hypokalemia  Continue with the potassium.  Continue to follow-up with nephrology.  Recent laboratory testing reviewed with her.  Orders:  •  CBC and differential; Future  •  Comprehensive metabolic panel; Future    Vitamin D deficiency  Continue with vitamin D supplementation.  Will continue to follow vitamin D level with routine laboratory testing and adjust dosing if needed.  Orders:  •  CBC and differential; Future  •  Vitamin D 25 hydroxy; Future    Chronic migraine without aura without status migrainosus, not intractable  Continue to follow-up with neurology.  Some of her current symptoms she feels are likely related to stressors and sleep issues.  Orders:  •  CBC and differential; Future        Preventive Screenings:  - Diabetes Screening: screening up-to-date  - Cholesterol Screening: screening not indicated and has hyperlipidemia   - Cervical cancer screening: screening up-to-date   - Breast cancer screening: screening up-to-date   - Colon cancer screening: screening up-to-date   - Lung cancer screening: screening not indicated     Immunizations:  - Immunizations due: Tdap and Zoster (Shingrix)    Counseling/Anticipatory Guidance:  - Alcohol: discussed moderation in alcohol intake and recommendations for healthy alcohol use.   - Dental health: discussed importance of regular tooth brushing, flossing, and dental visits.   - Diet: discussed recommendations for a healthy/well-balanced diet.   - Exercise: the importance of regular exercise/physical activity was discussed. Recommend exercise 3-5 times per week for at least 30 minutes.   - Injury prevention: discussed safety/seat belts, safety helmets, smoke detectors, carbon monoxide detectors, and smoking near bedding or upholstery.       Depression Screening and  Follow-up Plan: Patient's depression screening was positive with a PHQ-9 score of 14.   Patient with underlying depression and was advised to continue current medications as prescribed.         History of Present Illness     Adult Annual Physical:  Patient presents for annual physical. She presents for routine follow-up as well as annual wellness visit.  Still continues with some of the depression symptoms.  Does feel that they have improved somewhat but Mother's Day was difficult after the death of her mother.  Still with stressors at work.  Tries to remain as active as possible.  Is looking forward to the birth of her first grandchild.  She continues with difficulty sleeping.  Does not take the Ambien regularly since this does make her very tired in the morning and has difficulty waking up.  Tolerating her atorvastatin without difficulty.  Denies any significant muscle aches or weakness with this.  Does have some joint aches especially upon awakening.  Tolerating the verapamil without difficulty.  Continues with the intermittent migraines which she feels is related to stressors.  Does take THC on an as-needed basis for the mood symptoms and anxiety.  Allergy symptoms have been acting up.  Did run out of her Flonase.  Continues with the Xyzal.  Has been having increased nasal congestion which is clear as well as some eye irritation..     Diet and Physical Activity:  - Diet/Nutrition: no special diet.  - Exercise: no formal exercise.    Depression Screening:    - PHQ-9 Score: 14    General Health:  - Sleep: sleeps poorly and 4-6 hours of sleep on average.  - Hearing: normal hearing bilateral ears.  - Vision: no vision problems and wears glasses.  - Dental: regular dental visits.    /GYN Health:  - Follows with GYN: yes.   - History of STDs: no    Advanced Care Planning:  - Has an advanced directive?: no    - Has a durable medical POA?: no      Review of Systems   Constitutional:  Positive for fatigue. Negative for  activity change, appetite change, chills and fever.   HENT:  Positive for congestion and rhinorrhea.    Eyes:  Negative for visual disturbance.   Respiratory:  Negative for chest tightness and shortness of breath.    Cardiovascular:  Negative for chest pain and palpitations.   Gastrointestinal:  Negative for abdominal pain, blood in stool, diarrhea, nausea and vomiting.   Endocrine: Negative for polydipsia, polyphagia and polyuria.   Genitourinary:  Negative for dysuria, frequency and urgency.   Musculoskeletal:  Positive for arthralgias. Negative for gait problem.   Skin:  Negative for color change.   Allergic/Immunologic: Positive for environmental allergies.   Neurological:  Positive for headaches. Negative for dizziness.   Hematological:  Does not bruise/bleed easily.   Psychiatric/Behavioral:  Positive for decreased concentration, dysphoric mood and sleep disturbance. Negative for confusion. The patient is not nervous/anxious.      Medical History Reviewed by provider this encounter:  Tobacco  Allergies  Meds  Problems  Med Hx  Surg Hx  Fam Hx     .  Past Medical History   Past Medical History:   Diagnosis Date   • Abnormal Pap smear of cervix 1997   • Allergic    • Anxiety    • Asthma     seasonal allergy induced   • Bruxism (teeth grinding)    • Claustrophobia    • Depression    • Endometriosis    • Esophageal reflux    • Fibrocystic breast 12/28/18   • Fibroid As long as I can remember    Dense cystic breasts - fibriods.     Had laparoscopic surgery for endometriosis and later had an ablation   • GERD (gastroesophageal reflux disease)    • Headache(784.0)    • Headache, tension-type    • High cholesterol    • Hypertension    • Kidney stone    • Migraines    • Neurological disorder     Migraines   • Plantar fasciitis    • Seasonal allergies    • Shingles 11/2017   • Skin disorder     Pressure urticaria   • Sleep apnea     can't use CPAP   • Swelling of both lower extremities    • Varicella 1978   •  Wears contact lenses    • Wears glasses      Past Surgical History:   Procedure Laterality Date   • BONE GRAFT      L jaw for future false tooth implant   •  SECTION     • ENDOMETRIAL ABLATION     • FL RETROGRADE PYELOGRAM  2023   • FL RETROGRADE PYELOGRAM  2023   • FOOT SURGERY Left    • KIDNEY STONE SURGERY   or 9 and     Lg stone. Bad Kidney infection...emerg surg ended up placing Stent to drain infection & had to back to blast stone and remove   • KIDNEY SURGERY  2023 - 2023    Kidney infection with stone 3 surgeries    • LAPAROSCOPY      of uterus   • AL CYSTO/URETERO W/LITHOTRIPSY &INDWELL STENT INSRT Left 2023    Procedure: CYSTOSCOPY  RETROGRADE PYELOGRAM AND INSERTION STENT URETERAL;  Surgeon: Nick Gallagher MD;  Location:  MAIN OR;  Service: Urology   • AL CYSTO/URETERO W/LITHOTRIPSY &INDWELL STENT INSRT Left 2023    Procedure: CYSTO USCOPE W/  LASER, &STENT;  Surgeon: Lee Holliday MD;  Location[facility];  Service: Urology   • AL CYSTO/URETERO W/LITHOTRIPSY &INDWELL STENT INSRT Left 08/15/2023    Procedure: CYSTOSCOPY URETEROSCOPY WITH LITHOTRIPSY HOLMIUM LASER, RETROGRADE PYELOGRAM AND INSERTION STENT URETERAL;  Surgeon: Ruben Donahue MD;  Location:  MAIN OR;  Service: Urology   • TOOTH EXTRACTION      3 besides wisdom teeth   • WISDOM TOOTH EXTRACTION      2 of teeth     Family History   Adopted: Yes   Problem Relation Age of Onset   • Heart murmur Mother    • Depression Mother    • Pancreatic cancer Mother 73        2023 diagnosed   • Colon cancer Mother         Pancreatic cancer/   • Cancer Mother         Pancreatic cancer diasgosed 4/10/23. Struggling and in pain   • Hypertension Father            • Abdominal aortic aneurysm Father    • Raynaud syndrome Sister    • Allergies Sister    • Hypothyroidism Sister    • Raynaud syndrome Sister    • Bipolar disorder Sister    • Allergies Sister    •  Allergies Daughter         Environmental   • Allergies Daughter         Environmental   • Colon cancer Maternal Grandmother         Colorectal cancer/   • Cancer Maternal Grandmother         Colorectal cancer/   • No Known Problems Maternal Grandfather    • No Known Problems Paternal Grandmother    • Asthma Paternal Grandfather    • Asperger's syndrome Brother    • Allergies Brother    • Cancer Maternal Uncle         Pancreatic cancer,    • Asperger's syndrome Family         disorder   • Diabetes Family         DM   • Bipolar disorder Family    • Depression Family    • Hypertension Family    • Raynaud syndrome Family         phenomenon   • Cancer Maternal Uncle         Stomach and bladder cancer/   • Cancer Maternal Uncle         Stomach and bladder cancer/      reports that she has never smoked. She has never used smokeless tobacco. She reports that she does not currently use alcohol. She reports current drug use. Drug: Marijuana.  Current Outpatient Medications   Medication Instructions   • albuterol (PROVENTIL HFA,VENTOLIN HFA) 90 mcg/act inhaler 2 puffs, Every 6 hours PRN   • atorvastatin (LIPITOR) 20 mg, Oral, Daily   • Cholecalciferol (VITAMIN D3 PO) Take by mouth   • escitalopram (LEXAPRO) 20 mg, Oral, Daily   • fluticasone (FLONASE) 50 mcg/act nasal spray 2 sprays, Nasal, Daily   • ibuprofen (MOTRIN) 200 mg tablet Every 6 hours PRN   • Levocetirizine Dihydrochloride (XYZAL PO) Daily   • NON FORMULARY 25 mg, As needed   • Nurtec 75 mg, Oral, As needed   • omeprazole (PRILOSEC) 20 mg, As needed   • potassium chloride (Klor-Con M20) 20 mEq tablet 20 mEq, Oral, Daily   • predniSONE 10 mg tablet Five pills a day for 2 days, 4 pills a day for 2 days, 3 pills a day for 2 days, 2 pills a day for 2 days, 1 pill a day for 2 days   • SEMAGLUTIDE PO 32 Units, Oral, Weekly, Injectable    • verapamil (CALAN-SR) 240 mg, Oral, Daily at bedtime   • zolpidem (AMBIEN) 5 mg, Oral, As  "needed   Allergies[1]   Medications Ordered Prior to Encounter[2]   Social History     Tobacco Use   • Smoking status: Never   • Smokeless tobacco: Never   • Tobacco comments:     I hate it.  Please get my  to stop using it.   Vaping Use   • Vaping status: Never Used   Substance and Sexual Activity   • Alcohol use: Not Currently     Comment: Consume 3-6  drinks/ year- rarely   • Drug use: Yes     Types: Marijuana     Comment: thc gummies- as needed for sleep   • Sexual activity: Yes     Partners: Male     Birth control/protection: None       Objective   /80 (BP Location: Left arm, Patient Position: Sitting, Cuff Size: Large)   Pulse 84   Ht 5' 6\" (1.676 m)   Wt 92.2 kg (203 lb 3.2 oz)   LMP 06/01/2018   SpO2 97%   BMI 32.80 kg/m²     Physical Exam  Vitals and nursing note reviewed.   Constitutional:       General: She is not in acute distress.     Appearance: She is well-developed, well-groomed and overweight.   HENT:      Head: Normocephalic and atraumatic.      Comments: Boggy nasal mucosa with clear nasal discharge    Eyes:      General:         Right eye: No discharge.         Left eye: No discharge.      Conjunctiva/sclera:      Right eye: Right conjunctiva is injected.      Left eye: Left conjunctiva is injected.      Pupils: Pupils are equal, round, and reactive to light.       Cardiovascular:      Rate and Rhythm: Normal rate and regular rhythm.      Heart sounds: Normal heart sounds. No murmur heard.     No friction rub. No gallop.   Pulmonary:      Effort: No respiratory distress.      Breath sounds: No wheezing or rales.   Abdominal:      General: Bowel sounds are normal. There is no distension.      Tenderness: There is no abdominal tenderness.   Lymphadenopathy:      Cervical: No cervical adenopathy.     Skin:     General: Skin is warm and dry.     Neurological:      Mental Status: She is alert and oriented to person, place, and time.     Psychiatric:         Mood and Affect: Mood is " depressed.         Speech: Speech normal.         Behavior: Behavior normal. Behavior is cooperative.         Cognition and Memory: Cognition and memory normal.                [1]  Allergies  Allergen Reactions   • Gabapentin Swelling     In lymph node   [2]  Current Outpatient Medications on File Prior to Visit   Medication Sig Dispense Refill   • albuterol (PROVENTIL HFA,VENTOLIN HFA) 90 mcg/act inhaler Inhale 2 puffs every 6 (six) hours as needed for wheezing     • atorvastatin (LIPITOR) 20 mg tablet take 1 tablet by mouth once daily 90 tablet 1   • Cholecalciferol (VITAMIN D3 PO) Take by mouth     • escitalopram (LEXAPRO) 20 mg tablet Take 1 tablet (20 mg total) by mouth daily 90 tablet 1   • ibuprofen (MOTRIN) 200 mg tablet Take by mouth every 6 (six) hours as needed for mild pain     • Levocetirizine Dihydrochloride (XYZAL PO) Take by mouth in the morning     • NON FORMULARY Take 25 mg by mouth if needed THC gummies for sleep     • omeprazole (PriLOSEC) 20 mg delayed release capsule Take 20 mg by mouth if needed     • potassium chloride (Klor-Con M20) 20 mEq tablet take 1 tablet by mouth once daily 90 tablet 3   • rimegepant sulfate (Nurtec) 75 mg TBDP Take 1 tablet (75 mg total) by mouth if needed (as needed)     • SEMAGLUTIDE PO Take 32 Units by mouth once a week Injectable     • verapamil (CALAN-SR) 240 mg CR tablet take 1 tablet by mouth once daily at bedtime 90 tablet 1   • zolpidem (AMBIEN) 5 mg tablet Take 1 tablet (5 mg total) by mouth if needed for sleep (Patient not taking: Reported on 5/13/2025)       No current facility-administered medications on file prior to visit.

## 2025-05-14 RX ORDER — PREDNISONE 10 MG/1
TABLET ORAL
Qty: 30 TABLET | Refills: 0 | Status: SHIPPED | OUTPATIENT
Start: 2025-05-14

## 2025-05-14 RX ORDER — FLUTICASONE PROPIONATE 50 MCG
2 SPRAY, SUSPENSION (ML) NASAL DAILY
Qty: 16 G | Refills: 5 | Status: SHIPPED | OUTPATIENT
Start: 2025-05-14 | End: 2026-05-14

## 2025-05-14 NOTE — ASSESSMENT & PLAN NOTE
Continue with the potassium.  Continue to follow-up with nephrology.  Recent laboratory testing reviewed with her.  Orders:  •  CBC and differential; Future  •  Comprehensive metabolic panel; Future

## 2025-05-14 NOTE — ASSESSMENT & PLAN NOTE
Continue with the atorvastatin.  Watch diet.  Increase fiber in diet.  Orders:  •  CBC and differential; Future  •  Comprehensive metabolic panel; Future  •  Lipid panel; Future  •  TSH, 3rd generation; Future

## 2025-05-14 NOTE — ASSESSMENT & PLAN NOTE
Continue to follow-up with neurology.  Some of her current symptoms she feels are likely related to stressors and sleep issues.  Orders:  •  CBC and differential; Future

## 2025-05-14 NOTE — ASSESSMENT & PLAN NOTE
Depression symptoms discussed.  Currently relatively stable.  Continue with the Lexapro.  Some of her depression symptoms are likely situational.  Methods for stress relief discussed.  Adequate sleep discussed.  Depression Screening Follow-up Plan: Patient's depression screening was positive with a PHQ-9 score of 14. Patient with underlying depression and was advised to continue current medications as prescribed.    Orders:  •  CBC and differential; Future

## 2025-05-14 NOTE — ASSESSMENT & PLAN NOTE
Chronic issues with insomnia discussed.  Continue with the Ambien on an as-needed basis.  Other options discussed to help with her sleep.  Orders:  •  CBC and differential; Future

## 2025-05-21 ENCOUNTER — RESULTS FOLLOW-UP (OUTPATIENT)
Age: 55
End: 2025-05-21

## 2025-05-21 NOTE — RESULT ENCOUNTER NOTE
I called and spoke with Tommy regarding her lab results. She is agreeable to starting Spironolactone 25mg. She states she is changing pharmacies to [facility] and is requesting the prescription be prescribed early next week instead. She is aware to keep an eye on BP readings and repeat lab work within 1-2 weeks after starting the new medication. She is scheduled to see Scarlett Pink for a virtual appt on June 23rd 2025 to review labs and discuss blood pressure readings.

## 2025-05-27 DIAGNOSIS — F41.9 ANXIETY: ICD-10-CM

## 2025-05-27 DIAGNOSIS — E78.2 MIXED HYPERLIPIDEMIA: ICD-10-CM

## 2025-05-27 DIAGNOSIS — G43.709 CHRONIC MIGRAINE WITHOUT AURA WITHOUT STATUS MIGRAINOSUS, NOT INTRACTABLE: ICD-10-CM

## 2025-05-27 DIAGNOSIS — J30.89 ALLERGIC RHINITIS DUE TO HOUSE DUST MITE: ICD-10-CM

## 2025-05-27 DIAGNOSIS — J30.1 CHRONIC SEASONAL ALLERGIC RHINITIS DUE TO POLLEN: ICD-10-CM

## 2025-05-27 DIAGNOSIS — F33.1 MODERATE EPISODE OF RECURRENT MAJOR DEPRESSIVE DISORDER (HCC): ICD-10-CM

## 2025-05-28 DIAGNOSIS — E26.9 HYPERALDOSTERONISM (HCC): Primary | ICD-10-CM

## 2025-05-28 RX ORDER — ESCITALOPRAM OXALATE 20 MG/1
20 TABLET ORAL DAILY
Qty: 90 TABLET | Refills: 1 | Status: SHIPPED | OUTPATIENT
Start: 2025-05-28

## 2025-05-28 RX ORDER — ATORVASTATIN CALCIUM 20 MG/1
20 TABLET, FILM COATED ORAL DAILY
Qty: 90 TABLET | Refills: 1 | Status: SHIPPED | OUTPATIENT
Start: 2025-05-28

## 2025-05-28 RX ORDER — VERAPAMIL HYDROCHLORIDE 240 MG/1
240 TABLET, FILM COATED, EXTENDED RELEASE ORAL
Qty: 90 TABLET | Refills: 1 | Status: SHIPPED | OUTPATIENT
Start: 2025-05-28

## 2025-05-28 RX ORDER — FLUTICASONE PROPIONATE 50 MCG
2 SPRAY, SUSPENSION (ML) NASAL DAILY
Qty: 48 G | Refills: 1 | Status: SHIPPED | OUTPATIENT
Start: 2025-05-28 | End: 2026-05-28

## 2025-05-28 RX ORDER — SPIRONOLACTONE 25 MG/1
25 TABLET ORAL DAILY
Qty: 90 TABLET | Refills: 1 | Status: SHIPPED | OUTPATIENT
Start: 2025-05-28

## 2025-06-07 ENCOUNTER — APPOINTMENT (OUTPATIENT)
Dept: LAB | Facility: HOSPITAL | Age: 55
End: 2025-06-07
Payer: COMMERCIAL

## 2025-06-07 DIAGNOSIS — E26.9 HYPERALDOSTERONISM (HCC): ICD-10-CM

## 2025-06-07 LAB
ANION GAP SERPL CALCULATED.3IONS-SCNC: 6 MMOL/L (ref 4–13)
BUN SERPL-MCNC: 17 MG/DL (ref 5–25)
CALCIUM SERPL-MCNC: 9.6 MG/DL (ref 8.4–10.2)
CHLORIDE SERPL-SCNC: 103 MMOL/L (ref 96–108)
CO2 SERPL-SCNC: 28 MMOL/L (ref 21–32)
CREAT SERPL-MCNC: 0.89 MG/DL (ref 0.6–1.3)
GFR SERPL CREATININE-BSD FRML MDRD: 74 ML/MIN/1.73SQ M
GLUCOSE P FAST SERPL-MCNC: 82 MG/DL (ref 65–99)
POTASSIUM SERPL-SCNC: 4 MMOL/L (ref 3.5–5.3)
SODIUM SERPL-SCNC: 137 MMOL/L (ref 135–147)

## 2025-06-07 PROCEDURE — 80048 BASIC METABOLIC PNL TOTAL CA: CPT

## 2025-06-07 PROCEDURE — 36415 COLL VENOUS BLD VENIPUNCTURE: CPT

## 2025-06-09 ENCOUNTER — RESULTS FOLLOW-UP (OUTPATIENT)
Age: 55
End: 2025-06-09

## 2025-06-09 NOTE — TELEPHONE ENCOUNTER
Patient called back, relayed the above message and she states she is not taking any potassium and she feels good on the spironolactone. She reports that her body does not feel as physically drained as it did before. She states she does not regularly check her BP but she feels fine.  ERON

## 2025-06-09 NOTE — TELEPHONE ENCOUNTER
I called and left a VM for Tommy to contact the office to discuss the following:      ----- Message from Tacos Rasheed DO sent at 6/9/2025  7:54 AM EDT -----  Labs look very good, kidney function good, potassium level is also right where would like for it to be.  Please confirm that she is not taking her potassium supplement and she is doing okay on the   new medication spironolactone.  Also, please ask how her blood pressures are  ----- Message -----  From: Lab, Background User  Sent: 6/7/2025  12:23 PM EDT  To: Tacos Rasheed DO

## 2025-06-23 ENCOUNTER — TELEMEDICINE (OUTPATIENT)
Age: 55
End: 2025-06-23
Payer: COMMERCIAL

## 2025-06-23 VITALS — WEIGHT: 200 LBS | BODY MASS INDEX: 32.28 KG/M2

## 2025-06-23 DIAGNOSIS — I10 PRIMARY HYPERTENSION: ICD-10-CM

## 2025-06-23 DIAGNOSIS — E55.9 VITAMIN D DEFICIENCY: ICD-10-CM

## 2025-06-23 DIAGNOSIS — R80.1 PERSISTENT PROTEINURIA: ICD-10-CM

## 2025-06-23 DIAGNOSIS — N20.0 NEPHROLITHIASIS: Primary | ICD-10-CM

## 2025-06-23 DIAGNOSIS — E87.6 HYPOKALEMIA: ICD-10-CM

## 2025-06-23 DIAGNOSIS — N20.1 URETERAL CALCULUS, LEFT: ICD-10-CM

## 2025-06-23 DIAGNOSIS — E66.9 OBESITY (BMI 30-39.9): ICD-10-CM

## 2025-06-23 DIAGNOSIS — N25.81 SECONDARY HYPERPARATHYROIDISM OF RENAL ORIGIN (HCC): ICD-10-CM

## 2025-06-23 DIAGNOSIS — I89.0 LYMPHEDEMA: ICD-10-CM

## 2025-06-23 DIAGNOSIS — E83.42 HYPOMAGNESEMIA: ICD-10-CM

## 2025-06-23 PROCEDURE — 98006 SYNCH AUDIO-VIDEO EST MOD 30: CPT

## 2025-06-23 NOTE — ASSESSMENT & PLAN NOTE
Virtual visit unable to check BP  Home blood pressure checks at home, usually 120/70-80s  Continue 2 g sodium restriction  Continue spironolactone 25 mg daily      Birth Control Pills Pregnancy And Lactation Text: This medication should be avoided if pregnant and for the first 30 days post-partum.

## 2025-06-23 NOTE — ASSESSMENT & PLAN NOTE
History of nephrolithiasis, follows with Dr. Rasheed.    24-hour urine ordered last visit but not yet completed.  Continue to drink enough fluid to produce at least 2 quarts of urine per day.  Has been enjoying lemonade per previous note from Dr. Rasheed, okay to continue pending completion of stone risk profile.  Patient has received stone risk profile collection kit, encouraged patient to have stone risk profile completed.  Denies recent passing of kidney stones, flank pain or abdominal pain

## 2025-06-23 NOTE — PROGRESS NOTES
Name: Tommy Busch      : 1970      MRN: [id number]  Encounter Provider: GWEN Thomas  Encounter Date: 2025   Encounter department: [facility]  :  Assessment & Plan  Nephrolithiasis  History of nephrolithiasis, follows with Dr. Rasheed.    24-hour urine ordered last visit but not yet completed.  Continue to drink enough fluid to produce at least 2 quarts of urine per day.  Has been enjoying lemonade per previous note from Dr. Rasheed, okay to continue pending completion of stone risk profile.  Patient has received stone risk profile collection kit, encouraged patient to have stone risk profile completed.  Denies recent passing of kidney stones, flank pain or abdominal pain     Primary hypertension  Virtual visit unable to check BP  Home blood pressure checks at home, usually 120/70-80s  Continue 2 g sodium restriction  Continue spironolactone 25 mg daily     Ureteral calculus, left  US KUB noting bilateral nephrolithiasis, no hydronephrosis, 10/22/2024.  Incidental finding of left adnexal cyst with referral for pelvic ultrasound.     Obesity (BMI 30-39.9)  Continue encourage healthy lifestyle choices including diet and exercise.     Hypokalemia  Potassium 4.0 mmol/L, 2025.  Will continue to monitor.     Lymphedema  History of mild lymphedema.  Dr. Rasheed advised to reduce sodium intake at last visit.  No current indication for diuretics. Uses compression stockings. States overall is improved over previous.       Hypomagnesemia         Secondary hyperparathyroidism of renal origin (HCC)         Persistent proteinuria         Vitamin D deficiency         Administrative Statements   Encounter provider GWEN Thomas    The Patient is located at Other and in the following state in which I hold an active license [place].    The patient was identified by name and date of birth. Tommy PERALTA Narayan was informed that this  is a telemedicine visit and that the visit is being conducted through the Epic Embedded platform. She agrees to proceed..  My office door was closed. No one else was in the room.  She acknowledged consent and understanding of privacy and security of the video platform. The patient has agreed to participate and understands they can discontinue the visit at any time.    I have spent a total time of 30 minutes in caring for this patient on the day of the visit/encounter including Diagnostic results, Prognosis, Risks and benefits of tx options, Instructions for management, Patient and family education, Importance of tx compliance, Risk factor reductions, Impressions, Counseling / Coordination of care, Documenting in the medical record, and Reviewing/placing orders in the medical record (including tests, medications, and/or procedures), not including the time spent for establishing the audio/video connection.       There are no Patient Instructions on file for this visit.    It was a pleasure evaluating your patient in the office today. Thank you for allowing our team to participate in the care of  Tommy Busch. Please do not hesitate to contact our team if further issues/questions shall arise in the interim.     History of Present Illness   HPI  Tommy Busch is a 54 y.o. female who presents to [place] office for follow-up of nephrolithiasis, primary hypertension, left ureteral calculus, hypokalemia and lymphedema.  PMH includes obesity, ABHI, depression, tachycardia and hyperlipidemia.  Denies recent hospitalizations, emergency department visits or illness.  States overall has been feeling well.      Review of Systems   Constitutional:  Negative for activity change, appetite change, chills, fatigue and fever.   HENT:  Negative for ear pain and sore throat.    Eyes:  Negative for pain and visual disturbance.   Respiratory:  Negative for cough and shortness of breath.    Cardiovascular:  Positive for leg  "swelling. Negative for chest pain and palpitations.   Gastrointestinal:  Negative for abdominal pain, constipation, diarrhea, nausea and vomiting.   Genitourinary:  Negative for decreased urine volume, difficulty urinating, dysuria, frequency and hematuria.   Musculoskeletal:  Negative for arthralgias and back pain.   Skin:  Negative for color change and rash.   Neurological:  Negative for dizziness, seizures, syncope, numbness and headaches.   Hematological: Negative.    Psychiatric/Behavioral: Negative.     All other systems reviewed and are negative.    Medical History Reviewed by provider this encounter:     .       Medications Ordered Prior to Encounter[1]  Objective   LMP 06/01/2018      Physical Exam  Constitutional:       Appearance: Normal appearance. She is obese.   HENT:      Head: Normocephalic and atraumatic.      Right Ear: External ear normal.      Left Ear: External ear normal.      Nose: Nose normal.      Mouth/Throat:      Mouth: Mucous membranes are moist.      Pharynx: Oropharynx is clear.     Eyes:      Conjunctiva/sclera: Conjunctivae normal.      Pupils: Pupils are equal, round, and reactive to light.       Cardiovascular:      Comments: Unable to assess due to virtual visit  Pulmonary:      Comments: Unable to assess due to virtual visit  Abdominal:      Comments: Unable to assess due to virtual visit     Musculoskeletal:      Comments: Unable to assess due to virtual visit  Patient states chronic edema is improved somewhat over baseline.      Skin:     Comments: Unable to assess due to virtual visit     Neurological:      General: No focal deficit present.      Mental Status: She is oriented to person, place, and time.     Psychiatric:         Mood and Affect: Mood normal.         Behavior: Behavior normal.           Laboratory Results:        Invalid input(s): \"ALBUMIN\"    Results for orders placed or performed in visit on 06/07/25   Basic metabolic panel   Result Value Ref Range    Sodium " 137 135 - 147 mmol/L    Potassium 4.0 3.5 - 5.3 mmol/L    Chloride 103 96 - 108 mmol/L    CO2 28 21 - 32 mmol/L    ANION GAP 6 4 - 13 mmol/L    BUN 17 5 - 25 mg/dL    Creatinine 0.89 0.60 - 1.30 mg/dL    Glucose, Fasting 82 65 - 99 mg/dL    Calcium 9.6 8.4 - 10.2 mg/dL    eGFR 74 ml/min/1.73sq m     *Note: Due to a large number of results and/or encounters for the requested time period, some results have not been displayed. A complete set of results can be found in Results Review.                [1]   Current Outpatient Medications on File Prior to Visit   Medication Sig Dispense Refill    albuterol (PROVENTIL HFA,VENTOLIN HFA) 90 mcg/act inhaler Inhale 2 puffs every 6 (six) hours as needed for wheezing      atorvastatin (LIPITOR) 20 mg tablet Take 1 tablet (20 mg total) by mouth in the morning 90 tablet 1    Cholecalciferol (VITAMIN D3 PO) Take by mouth      escitalopram (LEXAPRO) 20 mg tablet Take 1 tablet (20 mg total) by mouth daily 90 tablet 1    fluticasone (FLONASE) 50 mcg/act nasal spray 2 sprays into each nostril daily 48 g 1    ibuprofen (MOTRIN) 200 mg tablet Take by mouth every 6 (six) hours as needed for mild pain      Levocetirizine Dihydrochloride (XYZAL PO) Take by mouth in the morning      NON FORMULARY Take 25 mg by mouth if needed THC gummies for sleep      omeprazole (PriLOSEC) 20 mg delayed release capsule Take 20 mg by mouth if needed      rimegepant sulfate (Nurtec) 75 mg TBDP Take 1 tablet (75 mg total) by mouth if needed (as needed)      SEMAGLUTIDE PO Take 32 Units by mouth once a week Injectable      spironolactone (ALDACTONE) 25 mg tablet Take 1 tablet (25 mg total) by mouth daily 90 tablet 1    verapamil (CALAN-SR) 240 mg CR tablet Take 1 tablet (240 mg total) by mouth daily at bedtime 90 tablet 1     No current facility-administered medications on file prior to visit.

## 2025-06-23 NOTE — ASSESSMENT & PLAN NOTE
US KUB noting bilateral nephrolithiasis, no hydronephrosis, 10/22/2024.  Incidental finding of left adnexal cyst with referral for pelvic ultrasound.

## 2025-07-31 ENCOUNTER — VBI (OUTPATIENT)
Dept: ADMINISTRATIVE | Facility: OTHER | Age: 55
End: 2025-07-31

## (undated) DEVICE — SINGLE PORT MANIFOLD: Brand: NEPTUNE 2

## (undated) DEVICE — TUBING SUCTION 5MM X 12 FT

## (undated) DEVICE — SYRINGE 20ML LL

## (undated) DEVICE — SINGLE-USE DIGITAL FLEXIBLE URETEROSCOPE: Brand: APTRA

## (undated) DEVICE — POV-IOD SOLUTION 4OZ BT

## (undated) DEVICE — FIBER STD QUANTA 272 MICRON

## (undated) DEVICE — PREMIUM DRY TRAY LF: Brand: MEDLINE INDUSTRIES, INC.

## (undated) DEVICE — GLOVE SRG BIOGEL 7.5

## (undated) DEVICE — PACK TUR

## (undated) DEVICE — GUIDEWIRE STRGHT TIP 0.035 IN  SOLO PLUS

## (undated) DEVICE — GLOVE INDICATOR PI UNDERGLOVE SZ 8 BLUE

## (undated) DEVICE — UROLOGIC DRAIN BAG: Brand: UNBRANDED

## (undated) DEVICE — SCD SEQUENTIAL COMPRESSION COMFORT SLEEVE MEDIUM KNEE LENGTH: Brand: KENDALL SCD

## (undated) DEVICE — INVIEW CLEAR LEGGINGS: Brand: CONVERTORS

## (undated) DEVICE — CATH URETERAL 5FR X 70 CM FLEX TIP POLYUR BARD

## (undated) DEVICE — STERILE SURGICAL LUBRICANT,  TUBE: Brand: SURGILUBE

## (undated) DEVICE — ENDOSCOPIC VALVE WITH ADAPTER.: Brand: SURSEAL® II

## (undated) DEVICE — SHEATH URETERAL ACCESS 11/13FR 35CM PROXIS

## (undated) DEVICE — ASTOUND STANDARD SURGICAL GOWN, XL: Brand: CONVERTORS

## (undated) DEVICE — FIBER HOLMIUM MP EXCALIBUR 272

## (undated) DEVICE — SHEATH URETERAL ACCESS 10/12FR 35CM PROXIS

## (undated) DEVICE — 3000CC GUARDIAN II: Brand: GUARDIAN

## (undated) DEVICE — STERILE POLYISOPRENE POWDER-FREE SURGICAL GLOVES: Brand: PROTEXIS